# Patient Record
Sex: FEMALE | Race: WHITE | NOT HISPANIC OR LATINO
[De-identification: names, ages, dates, MRNs, and addresses within clinical notes are randomized per-mention and may not be internally consistent; named-entity substitution may affect disease eponyms.]

---

## 2019-02-19 ENCOUNTER — APPOINTMENT (OUTPATIENT)
Dept: ORTHOPEDIC SURGERY | Facility: CLINIC | Age: 54
End: 2019-02-19
Payer: COMMERCIAL

## 2019-02-19 VITALS
SYSTOLIC BLOOD PRESSURE: 107 MMHG | BODY MASS INDEX: 22.16 KG/M2 | HEART RATE: 103 BPM | HEIGHT: 65 IN | WEIGHT: 133 LBS | DIASTOLIC BLOOD PRESSURE: 73 MMHG

## 2019-02-19 DIAGNOSIS — G89.0 CENTRAL PAIN SYNDROME: ICD-10-CM

## 2019-02-19 PROCEDURE — 99204 OFFICE O/P NEW MOD 45 MIN: CPT

## 2019-02-19 NOTE — PHYSICAL EXAM
[FreeTextEntry1] : Physical exam reveals a patient who seems to be distress complaining about time off neurological pain dysesthesia along the left side of the we have free of pain and discomfort over the dorsal aspect of the left foot on exam there is a solid soft tissue mass over the anterolateral aspect of the ankle MRI scan demonstrated deeply seated soft tissue mass of undetermined etiology. At this stage patient was recommended to consider exploration resection soft tissue mass left ankle the nature of the surgical procedure was discussed with the patient

## 2019-02-19 NOTE — REASON FOR VISIT
[FreeTextEntry1] : Presented for evaluation of pain tenderness and dysesthesia along the dorsal aspect of the left foot with a soft tissue mass left ankle

## 2019-02-22 ENCOUNTER — APPOINTMENT (OUTPATIENT)
Dept: ORTHOPEDIC SURGERY | Facility: HOSPITAL | Age: 54
End: 2019-02-22

## 2019-03-19 ENCOUNTER — APPOINTMENT (OUTPATIENT)
Dept: ORTHOPEDIC SURGERY | Facility: CLINIC | Age: 54
End: 2019-03-19

## 2019-03-21 ENCOUNTER — OUTPATIENT (OUTPATIENT)
Dept: OUTPATIENT SERVICES | Facility: HOSPITAL | Age: 54
LOS: 1 days | End: 2019-03-21
Payer: COMMERCIAL

## 2019-03-21 VITALS
OXYGEN SATURATION: 98 % | RESPIRATION RATE: 16 BRPM | HEART RATE: 91 BPM | DIASTOLIC BLOOD PRESSURE: 72 MMHG | SYSTOLIC BLOOD PRESSURE: 100 MMHG | HEIGHT: 63 IN | WEIGHT: 134.04 LBS | TEMPERATURE: 98 F

## 2019-03-21 DIAGNOSIS — Z90.89 ACQUIRED ABSENCE OF OTHER ORGANS: Chronic | ICD-10-CM

## 2019-03-21 DIAGNOSIS — M79.9 SOFT TISSUE DISORDER, UNSPECIFIED: ICD-10-CM

## 2019-03-21 DIAGNOSIS — Z78.9 OTHER SPECIFIED HEALTH STATUS: ICD-10-CM

## 2019-03-21 DIAGNOSIS — Z98.890 OTHER SPECIFIED POSTPROCEDURAL STATES: Chronic | ICD-10-CM

## 2019-03-21 DIAGNOSIS — Z90.49 ACQUIRED ABSENCE OF OTHER SPECIFIED PARTS OF DIGESTIVE TRACT: Chronic | ICD-10-CM

## 2019-03-21 DIAGNOSIS — R22.42 LOCALIZED SWELLING, MASS AND LUMP, LEFT LOWER LIMB: ICD-10-CM

## 2019-03-21 LAB
ALBUMIN SERPL ELPH-MCNC: 4.2 G/DL — SIGNIFICANT CHANGE UP (ref 3.3–5)
ALP SERPL-CCNC: 93 U/L — SIGNIFICANT CHANGE UP (ref 40–120)
ALT FLD-CCNC: 31 U/L — SIGNIFICANT CHANGE UP (ref 4–33)
ANION GAP SERPL CALC-SCNC: 11 MMO/L — SIGNIFICANT CHANGE UP (ref 7–14)
AST SERPL-CCNC: 24 U/L — SIGNIFICANT CHANGE UP (ref 4–32)
BILIRUB SERPL-MCNC: 0.4 MG/DL — SIGNIFICANT CHANGE UP (ref 0.2–1.2)
BUN SERPL-MCNC: 18 MG/DL — SIGNIFICANT CHANGE UP (ref 7–23)
CALCIUM SERPL-MCNC: 9.8 MG/DL — SIGNIFICANT CHANGE UP (ref 8.4–10.5)
CHLORIDE SERPL-SCNC: 103 MMOL/L — SIGNIFICANT CHANGE UP (ref 98–107)
CO2 SERPL-SCNC: 25 MMOL/L — SIGNIFICANT CHANGE UP (ref 22–31)
CREAT SERPL-MCNC: 0.57 MG/DL — SIGNIFICANT CHANGE UP (ref 0.5–1.3)
GLUCOSE SERPL-MCNC: 92 MG/DL — SIGNIFICANT CHANGE UP (ref 70–99)
HCG SERPL-ACNC: < 5 MIU/ML — SIGNIFICANT CHANGE UP
HCT VFR BLD CALC: 41.2 % — SIGNIFICANT CHANGE UP (ref 34.5–45)
HGB BLD-MCNC: 13.2 G/DL — SIGNIFICANT CHANGE UP (ref 11.5–15.5)
MCHC RBC-ENTMCNC: 30.1 PG — SIGNIFICANT CHANGE UP (ref 27–34)
MCHC RBC-ENTMCNC: 32 % — SIGNIFICANT CHANGE UP (ref 32–36)
MCV RBC AUTO: 94.1 FL — SIGNIFICANT CHANGE UP (ref 80–100)
NRBC # FLD: 0 K/UL — LOW (ref 25–125)
PLATELET # BLD AUTO: 324 K/UL — SIGNIFICANT CHANGE UP (ref 150–400)
PMV BLD: 9.1 FL — SIGNIFICANT CHANGE UP (ref 7–13)
POTASSIUM SERPL-MCNC: 3.9 MMOL/L — SIGNIFICANT CHANGE UP (ref 3.5–5.3)
POTASSIUM SERPL-SCNC: 3.9 MMOL/L — SIGNIFICANT CHANGE UP (ref 3.5–5.3)
PROT SERPL-MCNC: 6.9 G/DL — SIGNIFICANT CHANGE UP (ref 6–8.3)
RBC # BLD: 4.38 M/UL — SIGNIFICANT CHANGE UP (ref 3.8–5.2)
RBC # FLD: 12.3 % — SIGNIFICANT CHANGE UP (ref 10.3–14.5)
SODIUM SERPL-SCNC: 139 MMOL/L — SIGNIFICANT CHANGE UP (ref 135–145)
WBC # BLD: 7.65 K/UL — SIGNIFICANT CHANGE UP (ref 3.8–10.5)
WBC # FLD AUTO: 7.65 K/UL — SIGNIFICANT CHANGE UP (ref 3.8–10.5)

## 2019-03-21 PROCEDURE — 93010 ELECTROCARDIOGRAM REPORT: CPT

## 2019-03-21 RX ORDER — SODIUM CHLORIDE 9 MG/ML
1000 INJECTION, SOLUTION INTRAVENOUS
Qty: 0 | Refills: 0 | Status: DISCONTINUED | OUTPATIENT
Start: 2019-03-28 | End: 2019-04-12

## 2019-03-21 NOTE — H&P PST ADULT - NSICDXPROBLEM_GEN_ALL_CORE_FT
PROBLEM DIAGNOSES  Problem: Mass of left ankle  Assessment and Plan: Pt. is scheduled for exploration resection soft tissue mass left ankle 3/28/19.     Problem: Patient is scheduled for surgical procedure  Assessment and Plan: OR booking notified of sleep apnea and PCN allergy.

## 2019-03-21 NOTE — H&P PST ADULT - NSICDXFAMILYHX_GEN_ALL_CORE_FT
FAMILY HISTORY:  Family history of TIAs, father   FH: diabetes mellitus, father   FH: HTN (hypertension), father , mother 83 yo

## 2019-03-21 NOTE — H&P PST ADULT - NSICDXPASTSURGICALHX_GEN_ALL_CORE_FT
PAST SURGICAL HISTORY:  H/O lymph node biopsy "mediastinal"-2005    History of appendectomy as a child    History of tonsillectomy as a child

## 2019-03-21 NOTE — H&P PST ADULT - NSICDXPASTMEDICALHX_GEN_ALL_CORE_FT
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

## 2019-03-22 PROBLEM — I63.9 CEREBRAL INFARCTION, UNSPECIFIED: Chronic | Status: ACTIVE | Noted: 2019-03-21

## 2019-03-22 PROBLEM — C81.90 HODGKIN LYMPHOMA, UNSPECIFIED, UNSPECIFIED SITE: Chronic | Status: ACTIVE | Noted: 2019-03-21

## 2019-03-22 PROBLEM — Z86.69 PERSONAL HISTORY OF OTHER DISEASES OF THE NERVOUS SYSTEM AND SENSE ORGANS: Chronic | Status: ACTIVE | Noted: 2019-03-21

## 2019-03-22 PROBLEM — Z87.898 PERSONAL HISTORY OF OTHER SPECIFIED CONDITIONS: Chronic | Status: ACTIVE | Noted: 2019-03-21

## 2019-03-27 ENCOUNTER — TRANSCRIPTION ENCOUNTER (OUTPATIENT)
Age: 54
End: 2019-03-27

## 2019-03-28 ENCOUNTER — RESULT REVIEW (OUTPATIENT)
Age: 54
End: 2019-03-28

## 2019-03-28 ENCOUNTER — OUTPATIENT (OUTPATIENT)
Dept: OUTPATIENT SERVICES | Facility: HOSPITAL | Age: 54
LOS: 1 days | Discharge: HOME | End: 2019-03-28
Payer: COMMERCIAL

## 2019-03-28 ENCOUNTER — APPOINTMENT (OUTPATIENT)
Age: 54
End: 2019-03-28

## 2019-03-28 VITALS
DIASTOLIC BLOOD PRESSURE: 67 MMHG | TEMPERATURE: 98 F | HEIGHT: 63 IN | OXYGEN SATURATION: 97 % | RESPIRATION RATE: 16 BRPM | SYSTOLIC BLOOD PRESSURE: 108 MMHG | WEIGHT: 134.04 LBS | HEART RATE: 89 BPM

## 2019-03-28 VITALS
RESPIRATION RATE: 25 BRPM | HEART RATE: 109 BPM | OXYGEN SATURATION: 93 % | DIASTOLIC BLOOD PRESSURE: 79 MMHG | SYSTOLIC BLOOD PRESSURE: 120 MMHG

## 2019-03-28 DIAGNOSIS — Z90.89 ACQUIRED ABSENCE OF OTHER ORGANS: Chronic | ICD-10-CM

## 2019-03-28 DIAGNOSIS — Z98.890 OTHER SPECIFIED POSTPROCEDURAL STATES: Chronic | ICD-10-CM

## 2019-03-28 DIAGNOSIS — Z90.49 ACQUIRED ABSENCE OF OTHER SPECIFIED PARTS OF DIGESTIVE TRACT: Chronic | ICD-10-CM

## 2019-03-28 DIAGNOSIS — M79.9 SOFT TISSUE DISORDER, UNSPECIFIED: ICD-10-CM

## 2019-03-28 LAB — HCG UR QL: NEGATIVE — SIGNIFICANT CHANGE UP

## 2019-03-28 PROCEDURE — 88305 TISSUE EXAM BY PATHOLOGIST: CPT | Mod: 26

## 2019-03-28 PROCEDURE — 88331 PATH CONSLTJ SURG 1 BLK 1SPC: CPT | Mod: 26

## 2019-03-28 PROCEDURE — 27634 EXC LEG/ANKLE TUM DEP 5 CM/>: CPT | Mod: LT

## 2019-03-28 PROCEDURE — 88312 SPECIAL STAINS GROUP 1: CPT | Mod: 26

## 2019-03-28 PROCEDURE — 88304 TISSUE EXAM BY PATHOLOGIST: CPT | Mod: 26

## 2019-03-28 RX ORDER — FENTANYL CITRATE 50 UG/ML
25 INJECTION INTRAVENOUS
Qty: 0 | Refills: 0 | Status: DISCONTINUED | OUTPATIENT
Start: 2019-03-28 | End: 2019-03-28

## 2019-03-28 RX ORDER — CEPHALEXIN 500 MG
1 CAPSULE ORAL
Qty: 20 | Refills: 0
Start: 2019-03-28 | End: 2019-04-01

## 2019-03-28 RX ORDER — SODIUM CHLORIDE 9 MG/ML
1000 INJECTION, SOLUTION INTRAVENOUS
Qty: 0 | Refills: 0 | Status: DISCONTINUED | OUTPATIENT
Start: 2019-03-28 | End: 2019-04-12

## 2019-03-28 RX ORDER — OXYCODONE HYDROCHLORIDE 5 MG/1
1 TABLET ORAL
Qty: 20 | Refills: 0
Start: 2019-03-28

## 2019-03-28 RX ORDER — ONDANSETRON 8 MG/1
4 TABLET, FILM COATED ORAL ONCE
Qty: 0 | Refills: 0 | Status: DISCONTINUED | OUTPATIENT
Start: 2019-03-28 | End: 2019-04-12

## 2019-03-28 RX ORDER — OXYCODONE HYDROCHLORIDE 5 MG/1
5 TABLET ORAL ONCE
Qty: 0 | Refills: 0 | Status: DISCONTINUED | OUTPATIENT
Start: 2019-03-28 | End: 2019-03-28

## 2019-03-28 NOTE — BRIEF OPERATIVE NOTE - NSICDXBRIEFPROCEDURE_GEN_ALL_CORE_FT
PROCEDURES:  Excisional biopsy, mass, bone, lower leg 28-Mar-2019 20:43:28 right ankle Dong Castillo

## 2019-03-28 NOTE — ASU DISCHARGE PLAN (ADULT/PEDIATRIC) - CARE PROVIDER_API CALL
Matt Cain)  Orthopedics  [address], [address]  [place] [address]  Phone: [phone]  Fax: [phone]  Follow Up Time: 1 week

## 2019-03-28 NOTE — ASU DISCHARGE PLAN (ADULT/PEDIATRIC) - CALL YOUR DOCTOR IF YOU HAVE ANY OF THE FOLLOWING:
Fever greater than (need to indicate Fahrenheit or Celsius)/Numbness, tingling, color or temperature change to extremity/Bleeding that does not stop

## 2019-03-29 LAB
GRAM STN WND: SIGNIFICANT CHANGE UP
SPECIMEN SOURCE: SIGNIFICANT CHANGE UP

## 2019-03-30 LAB — SPECIMEN SOURCE: SIGNIFICANT CHANGE UP

## 2019-04-01 LAB
-  CEFAZOLIN: SIGNIFICANT CHANGE UP
-  CIPROFLOXACIN: SIGNIFICANT CHANGE UP
-  CLINDAMYCIN: SIGNIFICANT CHANGE UP
-  ERYTHROMYCIN: SIGNIFICANT CHANGE UP
-  GENTAMICIN: SIGNIFICANT CHANGE UP
-  LEVOFLOXACIN: SIGNIFICANT CHANGE UP
-  MOXIFLOXACIN(AEROBIC): SIGNIFICANT CHANGE UP
-  OXACILLIN: SIGNIFICANT CHANGE UP
-  RIFAMPIN.: SIGNIFICANT CHANGE UP
-  TETRACYCLINE: SIGNIFICANT CHANGE UP
-  TRIMETHOPRIM/SULFAMETHOXAZOLE: SIGNIFICANT CHANGE UP
-  VANCOMYCIN: SIGNIFICANT CHANGE UP
CULTURE - SURGICAL SITE: SIGNIFICANT CHANGE UP
GRAM STN WND: SIGNIFICANT CHANGE UP
METHOD TYPE: SIGNIFICANT CHANGE UP
ORGANISM # SPEC MICROSCOPIC CNT: SIGNIFICANT CHANGE UP
ORGANISM # SPEC MICROSCOPIC CNT: SIGNIFICANT CHANGE UP

## 2019-04-09 ENCOUNTER — APPOINTMENT (OUTPATIENT)
Age: 54
End: 2019-04-09
Payer: COMMERCIAL

## 2019-04-09 PROBLEM — F32.9 MAJOR DEPRESSIVE DISORDER, SINGLE EPISODE, UNSPECIFIED: Chronic | Status: ACTIVE | Noted: 2019-03-21

## 2019-04-09 PROBLEM — K80.20 CALCULUS OF GALLBLADDER WITHOUT CHOLECYSTITIS WITHOUT OBSTRUCTION: Chronic | Status: ACTIVE | Noted: 2019-03-21

## 2019-04-09 PROBLEM — K76.0 FATTY (CHANGE OF) LIVER, NOT ELSEWHERE CLASSIFIED: Chronic | Status: ACTIVE | Noted: 2019-03-21

## 2019-04-09 PROBLEM — K29.70 GASTRITIS, UNSPECIFIED, WITHOUT BLEEDING: Chronic | Status: ACTIVE | Noted: 2019-03-21

## 2019-04-09 PROBLEM — Z87.898 PERSONAL HISTORY OF OTHER SPECIFIED CONDITIONS: Chronic | Status: ACTIVE | Noted: 2019-03-21

## 2019-04-09 PROBLEM — M19.90 UNSPECIFIED OSTEOARTHRITIS, UNSPECIFIED SITE: Chronic | Status: ACTIVE | Noted: 2019-03-21

## 2019-04-09 PROCEDURE — 99024 POSTOP FOLLOW-UP VISIT: CPT

## 2019-04-09 NOTE — HISTORY OF PRESENT ILLNESS
[de-identified] : Patient was seen today in followup less than 2 weeks post exploration the compression of the large inflammatory mass involving the left ankle the surgical procedure was without any complication the pathology and suggested inflammatory process culture grew stapgraciela aureous . On examination today he surgical her wound healed nice stitches were removed most of the swelling subsided patient was informed that culture grew Staphylococcus Aurerous. Sensitive to Keflex. At this stage patient was recommended to continue antibiotic Keflex and to be seen by infectious disease specialist\par

## 2019-04-16 ENCOUNTER — APPOINTMENT (OUTPATIENT)
Age: 54
End: 2019-04-16
Payer: COMMERCIAL

## 2019-04-16 PROCEDURE — 99024 POSTOP FOLLOW-UP VISIT: CPT

## 2019-04-16 NOTE — HISTORY OF PRESENT ILLNESS
[de-identified] : On exam today swelling subsided surgical wound healed denies no discharge no skin erythema and patient regaining motion over the left ankle at this stage patient was recommended to continue to be seen by the infectious disease specialist to continue antibiotic as indicated and to be seen again in 3 months for followup the risk of length of reinfection of that area could not be ruled out [de-identified] : Patient was seen today in followup in 3 weeks following exploration debridement irrigation of an abscess from the lateral aspect of the left ankle and diagnoses as infected. Of this and the surgical procedure was without any complication he shouldn't at this stage getting a antibiotic.

## 2019-04-19 LAB — SURGICAL PATHOLOGY STUDY: SIGNIFICANT CHANGE UP

## 2019-04-24 LAB — FUNGUS SPEC QL CULT: SIGNIFICANT CHANGE UP

## 2019-05-12 ENCOUNTER — EMERGENCY (EMERGENCY)
Facility: HOSPITAL | Age: 54
LOS: 1 days | Discharge: HOME | End: 2019-05-12
Attending: EMERGENCY MEDICINE | Admitting: EMERGENCY MEDICINE
Payer: COMMERCIAL

## 2019-05-12 VITALS
OXYGEN SATURATION: 98 % | SYSTOLIC BLOOD PRESSURE: 131 MMHG | HEART RATE: 95 BPM | TEMPERATURE: 98 F | DIASTOLIC BLOOD PRESSURE: 93 MMHG | RESPIRATION RATE: 18 BRPM

## 2019-05-12 DIAGNOSIS — Z98.890 OTHER SPECIFIED POSTPROCEDURAL STATES: Chronic | ICD-10-CM

## 2019-05-12 DIAGNOSIS — Z90.89 ACQUIRED ABSENCE OF OTHER ORGANS: Chronic | ICD-10-CM

## 2019-05-12 DIAGNOSIS — Z90.49 ACQUIRED ABSENCE OF OTHER SPECIFIED PARTS OF DIGESTIVE TRACT: Chronic | ICD-10-CM

## 2019-05-12 PROCEDURE — 99285 EMERGENCY DEPT VISIT HI MDM: CPT | Mod: 25

## 2019-05-12 PROCEDURE — 73610 X-RAY EXAM OF ANKLE: CPT | Mod: 26,LT

## 2019-05-12 NOTE — ED PROVIDER NOTE - NSFOLLOWUPINSTRUCTIONS_ED_ALL_ED_FT
Please follow up with Dr. Cain this week in his office    Take the antibiotics prescribed to you for your ankle infection    Take tylenol and motrin for pain

## 2019-05-12 NOTE — ED PROVIDER NOTE - ATTENDING CONTRIBUTION TO CARE
53 year old with right ankle swelling and redness. previous removal of growth as site. no sysmtemic symptoms. mart check esr crp and ortho consult for drainage. septic arthritis possibel but unlikely. mart check basic labs, pain control, reassess

## 2019-05-12 NOTE — ED PROVIDER NOTE - PROGRESS NOTE DETAILS
Ortho consulted, will see patient shortly. discussed with ortho, unlikely septic joint, however waiting for attending given recent surgery. patient aware and agrees with plan to wait for attending. nancy power - paged ortho x2. no response. awaiting call back. patient well appearing, sleeping. christie, pgy-2: pt signed out to me by overnight resident, pt vitals remain stable christie: pt to be dced on PO keflex and f/u w/ Dr. Cain of orthopedics this week

## 2019-05-12 NOTE — ED ADULT NURSE NOTE - PMH
Acquired hypothyroidism  "from radiation", pt. 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

## 2019-05-12 NOTE — ED ADULT NURSE NOTE - PSH
H/O lymph node biopsy  "mediastinal"-2005  History of appendectomy  as a child  History of tonsillectomy  as a child

## 2019-05-12 NOTE — ED PROVIDER NOTE - OBJECTIVE STATEMENT
53yoF hx of CVA, arthritis, lymphoma in 2005, now sp left ankle mass removal (March 22) complicated by staff infection, on 4 weeks of doxycycline, now presenting with left ankle pain and swelling, worsening for 3 days, moderate, throbbing, worse with walking, but now only using crutches. Not improved with home pain meds. Denies fevers, chills, nausea, vomiting, diarrhea, chest pain, sob, abd pain, weakness, numbness, tingling or other issues.

## 2019-05-12 NOTE — ED ADULT TRIAGE NOTE - CHIEF COMPLAINT QUOTE
Pt who is s/p Excisional biopsy of mass & bone of her left lower leg 28-Mar-2019 which was complicated by a staph infection for which she completed a 2 week course of abx presents with c/o swelling and pain to surgical site x 3 days.

## 2019-05-12 NOTE — ED PROVIDER NOTE - CLINICAL SUMMARY MEDICAL DECISION MAKING FREE TEXT BOX
female with prior ankle surgery now with swelling, likely effusion, will send labs, crp, esr, xray, and call ortho for consult given risk of septic joint, although more likely inflammatory effusion. likely will require arthrocentesis with ortho guidance.

## 2019-05-13 VITALS
HEART RATE: 98 BPM | RESPIRATION RATE: 16 BRPM | OXYGEN SATURATION: 96 % | SYSTOLIC BLOOD PRESSURE: 111 MMHG | DIASTOLIC BLOOD PRESSURE: 65 MMHG | TEMPERATURE: 98 F

## 2019-05-13 LAB
ALBUMIN SERPL ELPH-MCNC: 4.2 G/DL — SIGNIFICANT CHANGE UP (ref 3.3–5)
ALP SERPL-CCNC: 110 U/L — SIGNIFICANT CHANGE UP (ref 40–120)
ALT FLD-CCNC: 22 U/L — SIGNIFICANT CHANGE UP (ref 4–33)
ANION GAP SERPL CALC-SCNC: 12 MMO/L — SIGNIFICANT CHANGE UP (ref 7–14)
APTT BLD: 31.3 SEC — SIGNIFICANT CHANGE UP (ref 27.5–36.3)
AST SERPL-CCNC: 21 U/L — SIGNIFICANT CHANGE UP (ref 4–32)
BASE EXCESS BLDV CALC-SCNC: 4.9 MMOL/L — SIGNIFICANT CHANGE UP
BASOPHILS # BLD AUTO: 0.05 K/UL — SIGNIFICANT CHANGE UP (ref 0–0.2)
BASOPHILS NFR BLD AUTO: 0.7 % — SIGNIFICANT CHANGE UP (ref 0–2)
BILIRUB SERPL-MCNC: 0.2 MG/DL — SIGNIFICANT CHANGE UP (ref 0.2–1.2)
BLOOD GAS VENOUS - CREATININE: 0.4 MG/DL — LOW (ref 0.5–1.3)
BUN SERPL-MCNC: 11 MG/DL — SIGNIFICANT CHANGE UP (ref 7–23)
CALCIUM SERPL-MCNC: 9.3 MG/DL — SIGNIFICANT CHANGE UP (ref 8.4–10.5)
CHLORIDE BLDV-SCNC: 106 MMOL/L — SIGNIFICANT CHANGE UP (ref 96–108)
CHLORIDE SERPL-SCNC: 103 MMOL/L — SIGNIFICANT CHANGE UP (ref 98–107)
CO2 SERPL-SCNC: 26 MMOL/L — SIGNIFICANT CHANGE UP (ref 22–31)
CREAT SERPL-MCNC: 0.57 MG/DL — SIGNIFICANT CHANGE UP (ref 0.5–1.3)
CRP SERPL-MCNC: 14.4 MG/L — HIGH
EOSINOPHIL # BLD AUTO: 0.39 K/UL — SIGNIFICANT CHANGE UP (ref 0–0.5)
EOSINOPHIL NFR BLD AUTO: 5.2 % — SIGNIFICANT CHANGE UP (ref 0–6)
ERYTHROCYTE [SEDIMENTATION RATE] IN BLOOD: 7 MM/HR — SIGNIFICANT CHANGE UP (ref 4–25)
GAS PNL BLDV: 136 MMOL/L — SIGNIFICANT CHANGE UP (ref 136–146)
GLUCOSE BLDV-MCNC: 101 MG/DL — HIGH (ref 70–99)
GLUCOSE SERPL-MCNC: 101 MG/DL — HIGH (ref 70–99)
HCO3 BLDV-SCNC: 28 MMOL/L — HIGH (ref 20–27)
HCT VFR BLD CALC: 40.5 % — SIGNIFICANT CHANGE UP (ref 34.5–45)
HCT VFR BLDV CALC: 41 % — SIGNIFICANT CHANGE UP (ref 34.5–45)
HGB BLD-MCNC: 13.2 G/DL — SIGNIFICANT CHANGE UP (ref 11.5–15.5)
HGB BLDV-MCNC: 13.3 G/DL — SIGNIFICANT CHANGE UP (ref 11.5–15.5)
IMM GRANULOCYTES NFR BLD AUTO: 0.4 % — SIGNIFICANT CHANGE UP (ref 0–1.5)
INR BLD: 0.94 — SIGNIFICANT CHANGE UP (ref 0.88–1.17)
LACTATE BLDV-MCNC: 1.2 MMOL/L — SIGNIFICANT CHANGE UP (ref 0.5–2)
LYMPHOCYTES # BLD AUTO: 1.96 K/UL — SIGNIFICANT CHANGE UP (ref 1–3.3)
LYMPHOCYTES # BLD AUTO: 25.9 % — SIGNIFICANT CHANGE UP (ref 13–44)
MCHC RBC-ENTMCNC: 30.7 PG — SIGNIFICANT CHANGE UP (ref 27–34)
MCHC RBC-ENTMCNC: 32.6 % — SIGNIFICANT CHANGE UP (ref 32–36)
MCV RBC AUTO: 94.2 FL — SIGNIFICANT CHANGE UP (ref 80–100)
MONOCYTES # BLD AUTO: 0.75 K/UL — SIGNIFICANT CHANGE UP (ref 0–0.9)
MONOCYTES NFR BLD AUTO: 9.9 % — SIGNIFICANT CHANGE UP (ref 2–14)
NEUTROPHILS # BLD AUTO: 4.38 K/UL — SIGNIFICANT CHANGE UP (ref 1.8–7.4)
NEUTROPHILS NFR BLD AUTO: 57.9 % — SIGNIFICANT CHANGE UP (ref 43–77)
NRBC # FLD: 0 K/UL — SIGNIFICANT CHANGE UP (ref 0–0)
PCO2 BLDV: 51 MMHG — SIGNIFICANT CHANGE UP (ref 41–51)
PH BLDV: 7.38 PH — SIGNIFICANT CHANGE UP (ref 7.32–7.43)
PLATELET # BLD AUTO: 288 K/UL — SIGNIFICANT CHANGE UP (ref 150–400)
PMV BLD: 9.2 FL — SIGNIFICANT CHANGE UP (ref 7–13)
PO2 BLDV: 45 MMHG — HIGH (ref 35–40)
POTASSIUM BLDV-SCNC: 3.7 MMOL/L — SIGNIFICANT CHANGE UP (ref 3.4–4.5)
POTASSIUM SERPL-MCNC: 4 MMOL/L — SIGNIFICANT CHANGE UP (ref 3.5–5.3)
POTASSIUM SERPL-SCNC: 4 MMOL/L — SIGNIFICANT CHANGE UP (ref 3.5–5.3)
PROT SERPL-MCNC: 7 G/DL — SIGNIFICANT CHANGE UP (ref 6–8.3)
PROTHROM AB SERPL-ACNC: 10.4 SEC — SIGNIFICANT CHANGE UP (ref 9.8–13.1)
RBC # BLD: 4.3 M/UL — SIGNIFICANT CHANGE UP (ref 3.8–5.2)
RBC # FLD: 12.1 % — SIGNIFICANT CHANGE UP (ref 10.3–14.5)
SAO2 % BLDV: 79.3 % — SIGNIFICANT CHANGE UP (ref 60–85)
SODIUM SERPL-SCNC: 141 MMOL/L — SIGNIFICANT CHANGE UP (ref 135–145)
WBC # BLD: 7.56 K/UL — SIGNIFICANT CHANGE UP (ref 3.8–10.5)
WBC # FLD AUTO: 7.56 K/UL — SIGNIFICANT CHANGE UP (ref 3.8–10.5)

## 2019-05-13 RX ORDER — CEPHALEXIN 500 MG
1 CAPSULE ORAL
Qty: 20 | Refills: 0
Start: 2019-05-13 | End: 2019-05-22

## 2019-05-13 RX ORDER — AMITRIPTYLINE HCL 25 MG
25 TABLET ORAL ONCE
Refills: 0 | Status: COMPLETED | OUTPATIENT
Start: 2019-05-13 | End: 2019-05-13

## 2019-05-13 RX ORDER — QUETIAPINE FUMARATE 200 MG/1
200 TABLET, FILM COATED ORAL ONCE
Refills: 0 | Status: COMPLETED | OUTPATIENT
Start: 2019-05-13 | End: 2019-05-13

## 2019-05-13 RX ADMIN — QUETIAPINE FUMARATE 200 MILLIGRAM(S): 200 TABLET, FILM COATED ORAL at 03:01

## 2019-05-13 RX ADMIN — Medication 25 MILLIGRAM(S): at 03:02

## 2019-05-13 NOTE — ED ADULT NURSE REASSESSMENT NOTE - NS ED NURSE REASSESS COMMENT FT1
report received from SUSHMA Bonilla, pt remains AAOx3, VS as per flow sheet, pt in NAD, awaiting ortho c/s and dispo, will continue to monitor pt.

## 2019-05-13 NOTE — CONSULT NOTE ADULT - SUBJECTIVE AND OBJECTIVE BOX
Orthopaedic Surgery Consult Note    Patient is a 53y old  Female who presents with a chief complaint of   HPI: 54yo F with PMH of CVA, arthritis, lymphoma, who had left ankle mass removal on . Cultures from cyst determined it to be an abscess that was infected with MSSA. She was treated with Keflex initially and then went and saw ID physician Dr. Richter who prescribed 4 weeks of doxycycline. She did well for a couple of weeks after cessation of antibiotics until a few days prior she developed left ankle pain and swelling. The swelling is localized to left lateral ankle where she had surgery. The ankle pain is throbbing and worse with walking. She is able to bear weight but has been using crutches. Denies fevers, chills, nausea, vomiting, diarrhea, chest pain, sob, abd pain, weakness, numbness, tingling or other issues.      PAST MEDICAL & SURGICAL HISTORY:  Acquired hypothyroidism: &quot;from radiation&quot;, pt. 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, the pressure is not high&quot;  Depression  History of seizure: x1-2007  H/O lymph node biopsy: &quot;mediastinal&quot;-  History of appendectomy: as a child  History of tonsillectomy: as a child    [] No significant past history as reviewed with the patient and family    FAMILY HISTORY:  Family history of TIAs: father   FH: diabetes mellitus: father   FH: HTN (hypertension): father , mother 83 yo    [] Family history not pertinent as reviewed with the patient and family    SOCIAL HISTORY:    MEDICATIONS  (STANDING):    MEDICATIONS  (PRN):    Allergies    penicillin (Rash)    Intolerances        REVIEW OF SYSTEMS  [x] All review of systems negative except for those marked.  Systemic:	[] Fever		[] Chills		[] Night sweats		[] Fatigue	[] Other  [] Cardiovascular:  [] Pulmonary:  [] Renal/Urologic:  [] Gastrointestinal:  [] Metabolic:  [] Neurologic:  [] Hematologic:  [] ENT:  [] Ophthalmologic:  [] Musculoskeletal: see HPI    Vital Signs Last 24 Hrs  T(C): 36.1 (13 May 2019 01:14), Max: 36.7 (12 May 2019 22:53)  T(F): 97 (13 May 2019 01:14), Max: 98 (12 May 2019 22:53)  HR: 90 (13 May 2019 01:14) (90 - 95)  BP: 111/74 (13 May 2019 01:14) (111/74 - 131/93)  BP(mean): --  RR: 16 (13 May 2019 01:14) (16 - 18)  SpO2: 97% (13 May 2019 01:14) (97% - 98%)      PHYSICAL EXAM:  Exam:  Gen: NAD  LLE:  Swelling, erythema, over lateral ankle  Well healed surgical scar   Warmth and soft tissue swelling palpated  No fluctuance or crepitus felt over lateral ankle   Motor: 5/5 EHL/FHL/TA/Gastrocnemius  Sensory: SILT DP/SP/S/S/T nerve distributions  Vascular: 2+ Dorsalis Pedis pulse                            13.2   7.56  )-----------( 288      ( 13 May 2019 00:00 )             40.5     05-13    141  |  103  |  11  ----------------------------<  101<H>  4.0   |  26  |  0.57    Ca    9.3      13 May 2019 00:00    TPro  7.0  /  Alb  4.2  /  TBili  0.2  /  DBili  x   /  AST  21  /  ALT  22  /  AlkPhos  110  05-13    PT/INR - ( 13 May 2019 00:00 )   PT: 10.4 SEC;   INR: 0.94          PTT - ( 13 May 2019 00:00 )  PTT:31.3 SEC      IMAGING STUDIES:  L ankle XR  INTERPRETATION:  Soft tissue swelling about the lateral malleolus. No   acute fracture or dislocation.

## 2019-05-13 NOTE — CONSULT NOTE ADULT - ASSESSMENT
53y Female with PMH as above and recent L ankle infected cyst removal on March 22. Patient likely has local recurrence of infection, low concern for septic arthritis at current time.     -pain control  -PT  -WBAT  -Will d/w attending Dr. Matt Cain

## 2019-05-16 ENCOUNTER — INPATIENT (INPATIENT)
Facility: HOSPITAL | Age: 54
LOS: 13 days | Discharge: HOME HEALTH | End: 2019-05-30
Attending: INTERNAL MEDICINE | Admitting: INTERNAL MEDICINE
Payer: COMMERCIAL

## 2019-05-16 VITALS
OXYGEN SATURATION: 100 % | SYSTOLIC BLOOD PRESSURE: 118 MMHG | RESPIRATION RATE: 16 BRPM | DIASTOLIC BLOOD PRESSURE: 79 MMHG | HEART RATE: 97 BPM | TEMPERATURE: 98 F

## 2019-05-16 DIAGNOSIS — Z98.890 OTHER SPECIFIED POSTPROCEDURAL STATES: Chronic | ICD-10-CM

## 2019-05-16 DIAGNOSIS — Z90.49 ACQUIRED ABSENCE OF OTHER SPECIFIED PARTS OF DIGESTIVE TRACT: Chronic | ICD-10-CM

## 2019-05-16 DIAGNOSIS — Z90.89 ACQUIRED ABSENCE OF OTHER ORGANS: Chronic | ICD-10-CM

## 2019-05-16 LAB
ANION GAP SERPL CALC-SCNC: 11 MMO/L — SIGNIFICANT CHANGE UP (ref 7–14)
BASE EXCESS BLDV CALC-SCNC: 6.4 MMOL/L — SIGNIFICANT CHANGE UP
BASOPHILS # BLD AUTO: 0.04 K/UL — SIGNIFICANT CHANGE UP (ref 0–0.2)
BASOPHILS NFR BLD AUTO: 0.5 % — SIGNIFICANT CHANGE UP (ref 0–2)
BLOOD GAS VENOUS - CREATININE: 0.47 MG/DL — LOW (ref 0.5–1.3)
BLOOD GAS VENOUS - FIO2: 21 — SIGNIFICANT CHANGE UP
BUN SERPL-MCNC: 19 MG/DL — SIGNIFICANT CHANGE UP (ref 7–23)
CALCIUM SERPL-MCNC: 9.8 MG/DL — SIGNIFICANT CHANGE UP (ref 8.4–10.5)
CHLORIDE BLDV-SCNC: 105 MMOL/L — SIGNIFICANT CHANGE UP (ref 96–108)
CHLORIDE SERPL-SCNC: 101 MMOL/L — SIGNIFICANT CHANGE UP (ref 98–107)
CO2 SERPL-SCNC: 29 MMOL/L — SIGNIFICANT CHANGE UP (ref 22–31)
CREAT SERPL-MCNC: 0.68 MG/DL — SIGNIFICANT CHANGE UP (ref 0.5–1.3)
EOSINOPHIL # BLD AUTO: 0.36 K/UL — SIGNIFICANT CHANGE UP (ref 0–0.5)
EOSINOPHIL NFR BLD AUTO: 4.6 % — SIGNIFICANT CHANGE UP (ref 0–6)
GAS PNL BLDV: 135 MMOL/L — LOW (ref 136–146)
GLUCOSE BLDV-MCNC: 87 MG/DL — SIGNIFICANT CHANGE UP (ref 70–99)
GLUCOSE SERPL-MCNC: 90 MG/DL — SIGNIFICANT CHANGE UP (ref 70–99)
HCO3 BLDV-SCNC: 28 MMOL/L — HIGH (ref 20–27)
HCT VFR BLD CALC: 40.4 % — SIGNIFICANT CHANGE UP (ref 34.5–45)
HCT VFR BLDV CALC: 40.5 % — SIGNIFICANT CHANGE UP (ref 34.5–45)
HGB BLD-MCNC: 13.1 G/DL — SIGNIFICANT CHANGE UP (ref 11.5–15.5)
HGB BLDV-MCNC: 13.2 G/DL — SIGNIFICANT CHANGE UP (ref 11.5–15.5)
IMM GRANULOCYTES NFR BLD AUTO: 0.3 % — SIGNIFICANT CHANGE UP (ref 0–1.5)
LACTATE BLDV-MCNC: 0.9 MMOL/L — SIGNIFICANT CHANGE UP (ref 0.5–2)
LYMPHOCYTES # BLD AUTO: 2.03 K/UL — SIGNIFICANT CHANGE UP (ref 1–3.3)
LYMPHOCYTES # BLD AUTO: 26.1 % — SIGNIFICANT CHANGE UP (ref 13–44)
MCHC RBC-ENTMCNC: 30.2 PG — SIGNIFICANT CHANGE UP (ref 27–34)
MCHC RBC-ENTMCNC: 32.4 % — SIGNIFICANT CHANGE UP (ref 32–36)
MCV RBC AUTO: 93.1 FL — SIGNIFICANT CHANGE UP (ref 80–100)
MONOCYTES # BLD AUTO: 0.68 K/UL — SIGNIFICANT CHANGE UP (ref 0–0.9)
MONOCYTES NFR BLD AUTO: 8.7 % — SIGNIFICANT CHANGE UP (ref 2–14)
NEUTROPHILS # BLD AUTO: 4.66 K/UL — SIGNIFICANT CHANGE UP (ref 1.8–7.4)
NEUTROPHILS NFR BLD AUTO: 59.8 % — SIGNIFICANT CHANGE UP (ref 43–77)
NRBC # FLD: 0 K/UL — SIGNIFICANT CHANGE UP (ref 0–0)
PCO2 BLDV: 53 MMHG — HIGH (ref 41–51)
PH BLDV: 7.39 PH — SIGNIFICANT CHANGE UP (ref 7.32–7.43)
PLATELET # BLD AUTO: 322 K/UL — SIGNIFICANT CHANGE UP (ref 150–400)
PMV BLD: 9.1 FL — SIGNIFICANT CHANGE UP (ref 7–13)
PO2 BLDV: 28 MMHG — LOW (ref 35–40)
POTASSIUM BLDV-SCNC: 3.8 MMOL/L — SIGNIFICANT CHANGE UP (ref 3.4–4.5)
POTASSIUM SERPL-MCNC: 4.2 MMOL/L — SIGNIFICANT CHANGE UP (ref 3.5–5.3)
POTASSIUM SERPL-SCNC: 4.2 MMOL/L — SIGNIFICANT CHANGE UP (ref 3.5–5.3)
RBC # BLD: 4.34 M/UL — SIGNIFICANT CHANGE UP (ref 3.8–5.2)
RBC # FLD: 11.9 % — SIGNIFICANT CHANGE UP (ref 10.3–14.5)
SAO2 % BLDV: 48.5 % — LOW (ref 60–85)
SODIUM SERPL-SCNC: 141 MMOL/L — SIGNIFICANT CHANGE UP (ref 135–145)
WBC # BLD: 7.79 K/UL — SIGNIFICANT CHANGE UP (ref 3.8–10.5)
WBC # FLD AUTO: 7.79 K/UL — SIGNIFICANT CHANGE UP (ref 3.8–10.5)

## 2019-05-16 PROCEDURE — 93971 EXTREMITY STUDY: CPT | Mod: 26,LT

## 2019-05-16 PROCEDURE — 73600 X-RAY EXAM OF ANKLE: CPT | Mod: 26,LT

## 2019-05-16 RX ORDER — AMITRIPTYLINE HCL 25 MG
25 TABLET ORAL AT BEDTIME
Refills: 0 | Status: DISCONTINUED | OUTPATIENT
Start: 2019-05-16 | End: 2019-05-30

## 2019-05-16 RX ORDER — QUETIAPINE FUMARATE 200 MG/1
100 TABLET, FILM COATED ORAL AT BEDTIME
Refills: 0 | Status: DISCONTINUED | OUTPATIENT
Start: 2019-05-16 | End: 2019-05-17

## 2019-05-16 RX ORDER — KETOROLAC TROMETHAMINE 30 MG/ML
15 SYRINGE (ML) INJECTION ONCE
Refills: 0 | Status: DISCONTINUED | OUTPATIENT
Start: 2019-05-16 | End: 2019-05-16

## 2019-05-16 RX ADMIN — Medication 15 MILLIGRAM(S): at 23:00

## 2019-05-16 RX ADMIN — Medication 100 MILLIGRAM(S): at 21:13

## 2019-05-16 RX ADMIN — Medication 15 MILLIGRAM(S): at 21:14

## 2019-05-16 RX ADMIN — Medication 25 MILLIGRAM(S): at 23:33

## 2019-05-16 RX ADMIN — QUETIAPINE FUMARATE 100 MILLIGRAM(S): 200 TABLET, FILM COATED ORAL at 23:33

## 2019-05-16 NOTE — ED PROVIDER NOTE - OBJECTIVE STATEMENT
52 yo F presents with worsening LLE swelling and redness over the past 1 week. patient is s/p excision of L ankle infected mass by ortho Dr. Cain on 3/28. completed a 4 week course of doxycyline last wednesday based on cultures, with improvement of swelling/erythema. that recurred on friday and has been worsening since then. was seen in the ER on sunday, and started on keflex abx. reports severe pain, has not taken any pain meds.   no f/ch, cp, sob, abd pain, N/V/D

## 2019-05-16 NOTE — ED CDU PROVIDER INITIAL DAY NOTE - PHYSICAL EXAMINATION
Left ankle: demarcated area of significant erythema and swelling at the lateral malleolus, swelling extends to the dorsum of the foot, +warmth to touch, DP/PT pulses 2+, sensation intact to light touch, motor intact, ambulatory with steady gait.   No calf tenderness.

## 2019-05-16 NOTE — ED PROVIDER NOTE - CLINICAL SUMMARY MEDICAL DECISION MAKING FREE TEXT BOX
worsening cellultisi of LLE on keflex abx. but cultures not sensitive for keflex. no fluctuance to suggest abcess. full rom of ankle joint, no e/o septic arthritis. patient started on clindamycin abx in the er based on wound cultures. worsening cellultisi of LLE on keflex abx. no fluctuance to suggest abcess. full rom of ankle joint, no e/o septic arthritis. patient started on clindamycin abx in the er based on wound cultures. Orthopedics and infectious disease were consulted. ID amenable  With clindamycin.  Ortho had no acute recommendations at this time.  Patient was transferred to the CDU for continued IV antibiotics, monitoring, ID consult and pain control

## 2019-05-16 NOTE — ED CDU PROVIDER INITIAL DAY NOTE - CHIEF COMPLAINT
The patient is a 53y Female complaining of The patient is a 53y Female complaining of worsening pain and swelling to left ankle s/p surgery on 3/28.

## 2019-05-16 NOTE — ED PROVIDER NOTE - SKIN, MLM
swelling of LLE below the knee, especially arouind the ankle and foot with erythema and warmth. incision is c/D/I with no purulent drainge. no crepitus. full rom of ankle without pain or restriction in motion. 2+ PP./

## 2019-05-16 NOTE — CONSULT NOTE ADULT - SUBJECTIVE AND OBJECTIVE BOX
Ortho Consult Note    54yo F with PMH of CVA, arthritis, lymphoma, who had left ankle mass removal on March 22. Cultures from cyst determined it to be an abscess that was infected with MSSA. She was treated with Keflex initially and then went and saw ID physician Dr. Richter who prescribed 4 weeks of doxycycline. She did well for a couple of weeks after cessation of antibiotics until a week ago she developed left ankle pain and swelling. She presented to [facility] on 5/12/19  and was discharged with PO keflex after being seen by Orthopedics. She was given instructions to follow up with Dr. Cain.     She presents today with the same left lateral ankle swelling and redness that has not improved with antibiotics. The swelling is localized to left lateral ankle where she had surgery. The ankle pain is throbbing and worse with walking. She is able to bear weight but has been using crutches. Denies fevers, chills, nausea, vomiting, diarrhea, chest pain, sob, abd pain, weakness, numbness, tingling or other issues.      MEDICATIONS  (STANDING):    Allergies    penicillin (Rash)    Intolerances                              13.1   7.79  )-----------( 322      ( 16 May 2019 21:10 )             40.4             Vital Signs Last 24 Hrs  T(C): 36.7 (05-16-19 @ 19:23), Max: 36.7 (05-16-19 @ 19:23)  T(F): 98.1 (05-16-19 @ 19:23), Max: 98.1 (05-16-19 @ 19:23)  HR: 97 (05-16-19 @ 19:23) (97 - 97)  BP: 118/79 (05-16-19 @ 19:23) (118/79 - 118/79)  BP(mean): --  RR: 16 (05-16-19 @ 19:23) (16 - 16)  SpO2: 100% (05-16-19 @ 19:23) (100% - 100%)    Imaging:  -XR L ankle: No fracture or dislocation, lateral soft tissue swelling.     PHYSICAL EXAM:  Exam:  Gen: NAD  LLE:  Swelling, erythema, over lateral ankle  Well healed surgical scar   Warmth and soft tissue swelling palpated  No fluctuance or crepitus felt over lateral ankle   Motor: 5/5 EHL/FHL/TA/Gastrocnemius  Sensory: SILT DP/SP/S/S/T nerve distributions  Vascular: 2+ Dorsalis Pedis pulse                            13.1   7.79  )-----------( 322      ( 16 May 2019 21:10 )             40.4     05-16    141  |  101  |  19  ----------------------------<  90  4.2   |  29  |  0.68    Ca    9.8      16 May 2019 20:51

## 2019-05-16 NOTE — ED CDU PROVIDER INITIAL DAY NOTE - OBJECTIVE STATEMENT
53 year old female with PMH of non-Hodgkin lymphoma (s/p radiation and chemo, 2005), CVA (2005), and depression presented to the ED with complaint of worsening swelling and pain to left ankle. Pt had a bone marrow injection to left ankle in [place] complicated by a mass which was removed surgically by ortho on 3/24. On 4/10, she was started on Doxycycline for post-op infection, poor response to doxy after 4 weeks of tx, came to ED on 5/12 and sent home on Keflex which pt has not helped much. Pt denies fever and chills. Pt denies any other complaints. 53 year old female with PMH of non-Hodgkin lymphoma (s/p radiation and chemo, 2005), CVA (2005), and depression presented to the ED with complaint of worsening swelling and pain to left ankle. Pt had a bone marrow injection to left ankle in [place] complicated by a mass which was removed surgically by ortho on 3/24. On 4/10, she was started on Doxycycline for post-op infection, poor response to doxy after 4 weeks of tx, came to ED on 5/12 and sent home on Keflex which pt has not helped much. Pt denies fever and chills, nausea, vomiting, chest pain or dyspnea, weakness, numbness or tingling sensation of left foot. Pt denies any other complaints.

## 2019-05-16 NOTE — CONSULT NOTE ADULT - ASSESSMENT
53y Female with PMH as above and recent L ankle infected cyst removal on March 22. Patient likely has local recurrence of infection, low concern for septic arthritis at current time. According to ED pt will be admitted to medicine for IV abx treatment.     -Abx as per primary team  -pain control  -PT  -WBAT  -Ortho to follow  -no acute surgical intervention at this time   -Will d/w attending Dr. Matt Cain

## 2019-05-16 NOTE — ED CDU PROVIDER INITIAL DAY NOTE - DETAILS
53 year old female with PMH of non-Hodgkin lymphoma (s/p radiation and chemo, 2005), CVA (2005), and depression presented to the ED with complaint of worsening swelling and pain to left ankle s/p surgical mass removal of left ankle, developed post-op infection treated with 4wks of Doxy, poor response, then started on Keflex now on Day #4 outpatient mngnt without improvement.   In ED, labs show no leukocytosis, lactate normal, ESR and CRP pending. Rt Duplex US - negative for DVT. Given one dose of clindamycin. Seen by otho - advised no acute surgical intervention. ID consulted - will see pt in am.  Pt sent to CDU for therapeutic intensity. Pt is hemodynamically stable, afebrile. non-septic appearing. P: Clindamycin 600mg IV q8h, nsaids, foot elevation, serial foot neuro exam, repeat CBC/lactate, start home meds, vitals q4h, diet, ortho to follow, ID to consult in am. 53 year old female with PMH of non-Hodgkin lymphoma (s/p radiation and chemo, 2005), CVA (2005), and depression presented to the ED with complaint of worsening swelling and pain to left ankle s/p surgical mass removal of left ankle, developed post-op infection treated with 4wks of Doxy, poor response, then started on Keflex now on Day #4 outpatient mngnt without improvement. Wound culture +staph aureus sensitive to Cefazolin.  In ED, labs show no leukocytosis, lactate normal, ESR and CRP pending. Rt Duplex US - negative for DVT. Given one dose of clindamycin. Seen by otho - advised no acute surgical intervention. ID consulted - will see pt in am.  Pt sent to CDU for therapeutic intensity. Pt is hemodynamically stable, afebrile. non-septic appearing. P: Clindamycin 600mg IV q8h, nsaids, foot elevation, serial foot neuro exam, repeat CBC/lactate, start home meds, vitals q4h, diet, ortho to follow, ID to consult in am. 53 year old female with PMH of non-Hodgkin lymphoma (s/p radiation and chemo, 2005), CVA (2005), and depression presented to the ED with complaint of worsening swelling and pain to left ankle s/p surgical mass removal of left ankle, developed post-op infection treated with 4wks of Doxy, poor response, then started on Keflex now on Day #4 outpatient mngnt without improvement. Wound culture +staph aureus sensitive to Cefazolin.  In ED, labs show no leukocytosis, lactate normal, ESR and CRP pending. Left ankle x-ray +soft tissue swelling and small joint effusion. Rt Duplex US - negative for DVT. Given one dose of clindamycin. Seen by otho - advised no acute surgical intervention. ID consulted - will see pt in am.  Pt sent to CDU for therapeutic intensity. Pt is hemodynamically stable, afebrile. non-septic appearing. P: Clindamycin 600mg IV q8h, nsaids, foot elevation, serial foot neuro exam, repeat CBC/lactate, start home meds, vitals q4h, diet, ortho to follow, ID to consult in am.

## 2019-05-16 NOTE — ED ADULT NURSE NOTE - OBJECTIVE STATEMENT
Pt received to the ED c/o left foot cellulitis, pt reports being prescribed cephalexin to no relief. pt a&ox4, 20G placed in left arm, labs drawn and sent, pt medicated as per ordered, will continue to monitor.

## 2019-05-16 NOTE — ED ADULT NURSE NOTE - NSIMPLEMENTINTERV_GEN_ALL_ED
Implemented All Universal Safety Interventions:  Ware to call system. Call bell, personal items and telephone within reach. Instruct patient to call for assistance. Room bathroom lighting operational. Non-slip footwear when patient is off stretcher. Physically safe environment: no spills, clutter or unnecessary equipment. Stretcher in lowest position, wheels locked, appropriate side rails in place.

## 2019-05-17 LAB
BASE EXCESS BLDV CALC-SCNC: 4.7 MMOL/L — SIGNIFICANT CHANGE UP
BASOPHILS # BLD AUTO: 0.04 K/UL — SIGNIFICANT CHANGE UP (ref 0–0.2)
BASOPHILS NFR BLD AUTO: 0.6 % — SIGNIFICANT CHANGE UP (ref 0–2)
BLOOD GAS VENOUS - CREATININE: 0.64 MG/DL — SIGNIFICANT CHANGE UP (ref 0.5–1.3)
BLOOD GAS VENOUS - FIO2: 21 — SIGNIFICANT CHANGE UP
CHLORIDE BLDV-SCNC: 105 MMOL/L — SIGNIFICANT CHANGE UP (ref 96–108)
EOSINOPHIL # BLD AUTO: 0.34 K/UL — SIGNIFICANT CHANGE UP (ref 0–0.5)
EOSINOPHIL NFR BLD AUTO: 5.3 % — SIGNIFICANT CHANGE UP (ref 0–6)
ERYTHROCYTE [SEDIMENTATION RATE] IN BLOOD: 20 MM/HR — SIGNIFICANT CHANGE UP (ref 4–25)
GAS PNL BLDV: 138 MMOL/L — SIGNIFICANT CHANGE UP (ref 136–146)
GLUCOSE BLDV-MCNC: 89 MG/DL — SIGNIFICANT CHANGE UP (ref 70–99)
HCO3 BLDV-SCNC: 28 MMOL/L — HIGH (ref 20–27)
HCT VFR BLD CALC: 37.7 % — SIGNIFICANT CHANGE UP (ref 34.5–45)
HCT VFR BLDV CALC: 38.2 % — SIGNIFICANT CHANGE UP (ref 34.5–45)
HGB BLD-MCNC: 12.3 G/DL — SIGNIFICANT CHANGE UP (ref 11.5–15.5)
HGB BLDV-MCNC: 12.4 G/DL — SIGNIFICANT CHANGE UP (ref 11.5–15.5)
IMM GRANULOCYTES NFR BLD AUTO: 0.2 % — SIGNIFICANT CHANGE UP (ref 0–1.5)
LACTATE BLDV-MCNC: 0.5 MMOL/L — SIGNIFICANT CHANGE UP (ref 0.5–2)
LYMPHOCYTES # BLD AUTO: 1.76 K/UL — SIGNIFICANT CHANGE UP (ref 1–3.3)
LYMPHOCYTES # BLD AUTO: 27.2 % — SIGNIFICANT CHANGE UP (ref 13–44)
MCHC RBC-ENTMCNC: 30.1 PG — SIGNIFICANT CHANGE UP (ref 27–34)
MCHC RBC-ENTMCNC: 32.6 % — SIGNIFICANT CHANGE UP (ref 32–36)
MCV RBC AUTO: 92.4 FL — SIGNIFICANT CHANGE UP (ref 80–100)
MONOCYTES # BLD AUTO: 0.69 K/UL — SIGNIFICANT CHANGE UP (ref 0–0.9)
MONOCYTES NFR BLD AUTO: 10.7 % — SIGNIFICANT CHANGE UP (ref 2–14)
NEUTROPHILS # BLD AUTO: 3.62 K/UL — SIGNIFICANT CHANGE UP (ref 1.8–7.4)
NEUTROPHILS NFR BLD AUTO: 56 % — SIGNIFICANT CHANGE UP (ref 43–77)
NRBC # FLD: 0 K/UL — SIGNIFICANT CHANGE UP (ref 0–0)
PCO2 BLDV: 49 MMHG — SIGNIFICANT CHANGE UP (ref 41–51)
PH BLDV: 7.4 PH — SIGNIFICANT CHANGE UP (ref 7.32–7.43)
PLATELET # BLD AUTO: 287 K/UL — SIGNIFICANT CHANGE UP (ref 150–400)
PMV BLD: 8.9 FL — SIGNIFICANT CHANGE UP (ref 7–13)
PO2 BLDV: 82 MMHG — HIGH (ref 35–40)
POTASSIUM BLDV-SCNC: 3.7 MMOL/L — SIGNIFICANT CHANGE UP (ref 3.4–4.5)
RBC # BLD: 4.08 M/UL — SIGNIFICANT CHANGE UP (ref 3.8–5.2)
RBC # FLD: 11.9 % — SIGNIFICANT CHANGE UP (ref 10.3–14.5)
SAO2 % BLDV: 96.4 % — HIGH (ref 60–85)
SPECIMEN SOURCE: SIGNIFICANT CHANGE UP
SPECIMEN SOURCE: SIGNIFICANT CHANGE UP
WBC # BLD: 6.46 K/UL — SIGNIFICANT CHANGE UP (ref 3.8–10.5)
WBC # FLD AUTO: 6.46 K/UL — SIGNIFICANT CHANGE UP (ref 3.8–10.5)

## 2019-05-17 PROCEDURE — 73722 MRI JOINT OF LWR EXTR W/DYE: CPT | Mod: 26,LT

## 2019-05-17 RX ORDER — QUETIAPINE FUMARATE 200 MG/1
100 TABLET, FILM COATED ORAL ONCE
Refills: 0 | Status: COMPLETED | OUTPATIENT
Start: 2019-05-17 | End: 2019-05-17

## 2019-05-17 RX ORDER — QUETIAPINE FUMARATE 200 MG/1
200 TABLET, FILM COATED ORAL AT BEDTIME
Refills: 0 | Status: DISCONTINUED | OUTPATIENT
Start: 2019-05-17 | End: 2019-05-30

## 2019-05-17 RX ORDER — LACTOBACILLUS ACIDOPHILUS 100MM CELL
1 CAPSULE ORAL DAILY
Refills: 0 | Status: DISCONTINUED | OUTPATIENT
Start: 2019-05-17 | End: 2019-05-19

## 2019-05-17 RX ADMIN — Medication 25 MILLIGRAM(S): at 22:01

## 2019-05-17 RX ADMIN — Medication 100 MILLIGRAM(S): at 13:14

## 2019-05-17 RX ADMIN — Medication 100 MILLIGRAM(S): at 22:01

## 2019-05-17 RX ADMIN — Medication 100 MILLIGRAM(S): at 06:17

## 2019-05-17 RX ADMIN — QUETIAPINE FUMARATE 200 MILLIGRAM(S): 200 TABLET, FILM COATED ORAL at 22:01

## 2019-05-17 RX ADMIN — Medication 600 MILLIGRAM(S): at 13:45

## 2019-05-17 RX ADMIN — QUETIAPINE FUMARATE 100 MILLIGRAM(S): 200 TABLET, FILM COATED ORAL at 01:56

## 2019-05-17 NOTE — CONSULT NOTE ADULT - SUBJECTIVE AND OBJECTIVE BOX
Elif Herman - [phone]    Patient is a 53y old  Female who presents with a chief complaint of cellulitis    HPI:  53F hx arthritis, NHL s/p chemo/RT in remission .  Recently, 3/22/19 noted to have ankle mass that was removed and was discharged with keflex.  Intra-op cultures with MSSA.  Was seen by Dr. Richter (at [place]).  Was given doxycycline for 4 weeks.  She completed this on 19.  By 5/10/19 - she noted her left foot/ankle had more erythema, swelling and pain.  Went to ER - 19 - seen by ortho and did not feel septic joint but likely a celluiltis.  She was discharged from ER on oral keflex.  Presented again because she did not feel better.    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, on drops to prevenyt glaucoma, the pressure is not high&quot;  Depression  History of seizure: x1-  H/O lymph node biopsy: &quot;mediastinal&quot;-  History of appendectomy: as a child  History of tonsillectomy: as a child    Allergies  penicillin (Rash)    ANTIMICROBIALS:    clindamycin IVPB 600 every 8 hours (-)    OTHER MEDS: MEDICATIONS  (STANDING):  amitriptyline 25 at bedtime  QUEtiapine 100 at bedtime    SOCIAL HISTORY:       FAMILY HISTORY:  Family history of TIAs: father   FH: diabetes mellitus: father   FH: HTN (hypertension): father , mother 83 yo    REVIEW OF SYSTEMS  [  ] ROS unobtainable because:    [  ] All other systems negative except as noted below:	    Constitutional:  [ ] fever [ ] chills  [ ] weight loss  [ ] weakness  Skin:  [ ] rash [ ] phlebitis	  Eyes: [ ] icterus [ ] pain  [ ] discharge	  ENMT: [ ] sore throat  [ ] thrush [ ] ulcers [ ] exudates  Respiratory: [ ] dyspnea [ ] hemoptysis [ ] cough [ ] sputum	  Cardiovascular:  [ ] chest pain [ ] palpitations [ ] edema	  Gastrointestinal:  [ ] nausea [ ] vomiting [ ] diarrhea [ ] constipation [ ] pain	  Genitourinary:  [ ] dysuria [ ] frequency [ ] hematuria [ ] discharge [ ] flank pain  [ ] incontinence  Musculoskeletal:  [ ] myalgias [ ] arthralgias [ ] arthritis  [ ] back pain  Neurological:  [ ] headache [ ] seizures  [ ] confusion/altered mental status  Psychiatric:  [ ] anxiety [ ] depression	  Hematology/Lymphatics:  [ ] lymphadenopathy  Endocrine:  [ ] adrenal [ ] thyroid  Allergic/Immunologic:	 [ ] transplant [ ] seasonal    Vital Signs Last 24 Hrs  T(F): 97.1 (19 @ 10:15), Max: 98.1 (19 @ 19:23)    Vital Signs Last 24 Hrs  HR: 102 (19 @ 10:15) (85 - 102)  BP: 95/60 (19 @ 10:15) (95/60 - 119/64)  RR: 14 (19 @ 10:15)  SpO2: 96% (19 @ 10:15) (96% - 100%)  Wt(kg): --                              12.3   6.46  )-----------( 287      ( 17 May 2019 03:57 )             37.7     141  |  101  |  19  ----------------------------<  90  4.2   |  29  |  0.68    Ca    9.8      16 May 2019 20:51    Surgical Pathology Report:   Final Diagnosis  1. Soft tissue mass, left ankle, excision:   - Fibroconnective tissue with degenerated mucin-like material and focal foreign body type giant cell reaction.  2. Soft tissue mass, left ankle, excision:  - Fibroconnective tissue with foci of necrosis, degenerated mucin-like material, granulomatous inflammation, and focal foreign body type giant cell reaction.  - Negative for Fungal organisms (GMS stain).  - AFB stain to be reported as addendum.  (19 @ 20:03)    MICROBIOLOGY:  Culture - Yeast and Fungus (19 @ 20:24)  CULTURE NEGATIVE FOR YEASTS AND MOLDS   AFTER 4 WEEKS    Specimen Source: OTHER    Culture - Surg Site Aerob/Anaer w/Gm St (19 @ 20:24)    Gram Stain Wound:   NOS No Organisms Seen  WBC^White Blood Cells  QNTY CELLS IN GRAM STAIN: RARE (1+)    -  Cefazolin: S <=4 JOSE    -  Ciprofloxacin: S <=1 JOSE    -  Clindamycin: S    -  Erythromycin: S    -  Gentamicin: S <=1 JOSE    -  Levofloxacin: S <=0.5 JOSE    -  Moxifloxacin(Aerobic): S <=0.5 JOSE    -  Oxacillin: S <=0.25 JOSE    -  Rifampin: S <=1 JOSE    -  Tetra/Doxy: S <=1 JOSE    -  Trimethoprim/Sulfamethoxazole: S <=0.5/9.5 JOSE    -  Vancomycin: S 1 JOSE    Culture - Surgical Site:   RARE    Specimen Source: OTHER    Organism Identification: Staphylococcus aureus    RADIOLOGY:  imaging below personally reviewed    Xray Ankle 2 Views, Left (19 @ 21:30) >  IMPRESSION:  Soft tissue swelling of the left ankle. Small joint effusion. No radiographic evidence of osteomyelitis. No acute fracture. Elif Herman - [phone]    Patient is a 53y old  Female who presents with a chief complaint of cellulitis    HPI:  53F hx arthritis, NHL s/p chemo/RT in remission .  Notes in 2017 has ?stem cells injected into L ankle and since then has had this problem. Recently, 3/22/19 noted to have ankle mass that was removed and was discharged with keflex x 1 week.  Intra-op cultures with MSSA.  Was seen by Dr. Richter (at [place]).  Was given doxycycline for 4 weeks.  She completed this on 19.  By 5/10/19 - she noted her left foot/ankle had more erythema, swelling and pain.  Went to ER - 19 - seen by ortho and did not feel septic joint but likely a celluiltis.  She was discharged from ER on oral keflex.  Presented again because she did not feel better.    seen by ortho this admission and they did not feel pt had a septic joint. Pt had a photo from - compared to current state, there is less erythema and some improvement in edema of left foot. No fevers.    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, on drops to prevenyt glaucoma, the pressure is not high&quot;  Depression  History of seizure: x1-2007  H/O lymph node biopsy: &quot;mediastinal&quot;-  History of appendectomy: as a child  History of tonsillectomy: as a child    Allergies  penicillin (Rash)    ANTIMICROBIALS:    clindamycin IVPB 600 every 8 hours (-)    OTHER MEDS: MEDICATIONS  (STANDING):  amitriptyline 25 at bedtime  QUEtiapine 100 at bedtime    SOCIAL HISTORY:       FAMILY HISTORY:  Family history of TIAs: father   FH: diabetes mellitus: father   FH: HTN (hypertension): father , mother 81 yo    REVIEW OF SYSTEMS  [  ] ROS unobtainable because:    [ x ] All other systems negative except as noted below:	    Constitutional:  [ ] fever [ ] chills  [ ] weight loss  [ ] weakness  Skin:  [ ] rash [ ] phlebitis	  Eyes: [ ] icterus [ ] pain  [ ] discharge	  ENMT: [ ] sore throat  [ ] thrush [ ] ulcers [ ] exudates  Respiratory: [ ] dyspnea [ ] hemoptysis [ ] cough [ ] sputum	  Cardiovascular:  [ ] chest pain [ ] palpitations [ ] edema	  Gastrointestinal:  [ ] nausea [ ] vomiting [ ] diarrhea [ ] constipation [ ] pain	  Genitourinary:  [ ] dysuria [ ] frequency [ ] hematuria [ ] discharge [ ] flank pain  [ ] incontinence  Musculoskeletal:  L foot edema, some erythema around left ankle   Neurological:  [ ] headache [ ] seizures  [ ] confusion/altered mental status  Psychiatric:  [ ] anxiety [ ] depression	  Hematology/Lymphatics:  [ ] lymphadenopathy  Endocrine:  [ ] adrenal [ ] thyroid  Allergic/Immunologic:	 [ ] transplant [ ] seasonal    Vital Signs Last 24 Hrs  T(F): 97.1 (19 @ 10:15), Max: 98.1 (19 @ 19:23)    Vital Signs Last 24 Hrs  HR: 102 (19 @ 10:15) (85 - 102)  BP: 95/60 (19 @ 10:15) (95/60 - 119/64)  RR: 14 (19 @ 10:15)  SpO2: 96% (19 @ 10:15) (96% - 100%)  Wt(kg): --                              12.3   6.46  )-----------( 287      ( 17 May 2019 03:57 )             37.7     141  |  101  |  19  ----------------------------<  90  4.2   |  29  |  0.68    Ca    9.8      16 May 2019 20:51    Surgical Pathology Report:   Final Diagnosis  1. Soft tissue mass, left ankle, excision:   - Fibroconnective tissue with degenerated mucin-like material and focal foreign body type giant cell reaction.  2. Soft tissue mass, left ankle, excision:  - Fibroconnective tissue with foci of necrosis, degenerated mucin-like material, granulomatous inflammation, and focal foreign body type giant cell reaction.  - Negative for Fungal organisms (GMS stain).  - AFB stain to be reported as addendum.  (19 @ 20:03)    MICROBIOLOGY:  Culture - Yeast and Fungus (19 @ 20:24)  CULTURE NEGATIVE FOR YEASTS AND MOLDS   AFTER 4 WEEKS    Specimen Source: OTHER    Culture - Surg Site Aerob/Anaer w/Gm St (19 @ 20:24)    Gram Stain Wound:   NOS No Organisms Seen  WBC^White Blood Cells  QNTY CELLS IN GRAM STAIN: RARE (1+)    -  Cefazolin: S <=4 JOSE    -  Ciprofloxacin: S <=1 JOSE    -  Clindamycin: S    -  Erythromycin: S    -  Gentamicin: S <=1 JOSE    -  Levofloxacin: S <=0.5 JOSE    -  Moxifloxacin(Aerobic): S <=0.5 JOSE    -  Oxacillin: S <=0.25 JOSE    -  Rifampin: S <=1 JOSE    -  Tetra/Doxy: S <=1 JOSE    -  Trimethoprim/Sulfamethoxazole: S <=0.5/9.5 JOSE    -  Vancomycin: S 1 JOSE    Culture - Surgical Site:   RARE    Specimen Source: OTHER    Organism Identification: Staphylococcus aureus    RADIOLOGY:  imaging below personally reviewed    Xray Ankle 2 Views, Left (19 @ 21:30) >  IMPRESSION:  Soft tissue swelling of the left ankle. Small joint effusion. No radiographic evidence of osteomyelitis. No acute fracture. Elif Herman - [phone]    Patient is a 53y old  Female who presents with a chief complaint of cellulitis    HPI:  53F hx arthritis, NHL s/p chemo/RT in remission .  Notes in 2017 has ?stem cells injected into L ankle and since then has had this problem. Recently, 3/22/19 noted to have ankle mass that was removed and was discharged with keflex x 1 week.  Intra-op cultures with MSSA.  Was seen by Dr. Richter (at [place]).  Was given doxycycline for 4 weeks.  She completed this on 19.  By 5/10/19 - she noted her left foot/ankle had more erythema, swelling and pain.  Went to ER - 19 - seen by ortho and did not feel septic joint but likely a celluiltis.  She was discharged from ER on oral keflex.  Presented again because she did not feel better.    seen by ortho this admission and they did not feel pt had a septic joint. Pt had a photo from - compared to current state, there is less erythema and some improvement in edema of left foot. No fevers.    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, on drops to prevenyt glaucoma, the pressure is not high&quot;  Depression  History of seizure: x1-2007  H/O lymph node biopsy: &quot;mediastinal&quot;-  History of appendectomy: as a child  History of tonsillectomy: as a child    Allergies  penicillin (Rash)    ANTIMICROBIALS:    clindamycin IVPB 600 every 8 hours (-)    OTHER MEDS: MEDICATIONS  (STANDING):  amitriptyline 25 at bedtime  QUEtiapine 100 at bedtime    SOCIAL HISTORY:   does not smoke; on disability    FAMILY HISTORY:  Family history of TIAs: father   FH: diabetes mellitus: father   FH: HTN (hypertension): father , mother 81 yo    REVIEW OF SYSTEMS  [  ] ROS unobtainable because:    [ x ] All other systems negative except as noted below:	    Constitutional:  [ ] fever [ ] chills  [ ] weight loss  [ ] weakness  Skin:  [ ] rash [ ] phlebitis	  Eyes: [ ] icterus [ ] pain  [ ] discharge	  ENMT: [ ] sore throat  [ ] thrush [ ] ulcers [ ] exudates  Respiratory: [ ] dyspnea [ ] hemoptysis [ ] cough [ ] sputum	  Cardiovascular:  [ ] chest pain [ ] palpitations [ ] edema	  Gastrointestinal:  [ ] nausea [ ] vomiting [ ] diarrhea [ ] constipation [ ] pain	  Genitourinary:  [ ] dysuria [ ] frequency [ ] hematuria [ ] discharge [ ] flank pain  [ ] incontinence  Musculoskeletal:  L foot edema, some erythema around left ankle   Neurological:  [ ] headache [ ] seizures  [ ] confusion/altered mental status  Psychiatric:  [ ] anxiety [ ] depression	  Hematology/Lymphatics:  [ ] lymphadenopathy  Endocrine:  [ ] adrenal [ ] thyroid  Allergic/Immunologic:	 [ ] transplant [ ] seasonal    Vital Signs Last 24 Hrs  T(F): 97.1 (19 @ 10:15), Max: 98.1 (19 @ 19:23)    Vital Signs Last 24 Hrs  HR: 102 (19 @ 10:15) (85 - 102)  BP: 95/60 (19 @ 10:15) (95/60 - 119/64)  RR: 14 (19 @ 10:15)  SpO2: 96% (19 @ 10:15) (96% - 100%)  Wt(kg): --    PHYSICAL EXAM:  General: non-toxic; feisty  HEAD/EYES: anicteric  ENT:  supple  Cardiovascular:   S1, S2  Respiratory:  clear bilaterally  GI:  soft, non-tender, normal bowel sounds  :  no betancur  Musculoskeletal:  swollen L ankle but good ROM; minimal if any erythema; no open lesions; no sores  Neurologic:  grossly non-focal  Skin:  no rash  Psychiatric:  appropriate affect  Vascular:  no phlebitis    Sedimentation Rate, Erythrocyte: 20 (19)  Sedimentation Rate, Erythrocyte: 7 (19)    C-Reactive Protein, Serum: 14.4 (19)                      12.3   6.46  )-----------( 287      ( 17 May 2019 03:57 )             37.7     141  |  101  |  19  ----------------------------<  90  4.2   |  29  |  0.68    Ca    9.8      16 May 2019 20:51    Surgical Pathology Report:   Final Diagnosis  1. Soft tissue mass, left ankle, excision:   - Fibroconnective tissue with degenerated mucin-like material and focal foreign body type giant cell reaction.  2. Soft tissue mass, left ankle, excision:  - Fibroconnective tissue with foci of necrosis, degenerated mucin-like material, granulomatous inflammation, and focal foreign body type giant cell reaction.  - Negative for Fungal organisms (GMS stain).  - AFB stain to be reported as addendum.  (19 @ 20:03)    MICROBIOLOGY:  Culture - Yeast and Fungus (19 @ 20:24)  CULTURE NEGATIVE FOR YEASTS AND MOLDS   AFTER 4 WEEKS    Specimen Source: OTHER    Culture - Surg Site Aerob/Anaer w/Gm St (19 @ 20:24)    Gram Stain Wound:   NOS No Organisms Seen  WBC^White Blood Cells  QNTY CELLS IN GRAM STAIN: RARE (1+)    -  Cefazolin: S <=4 JOSE    -  Ciprofloxacin: S <=1 JOSE    -  Clindamycin: S    -  Erythromycin: S    -  Gentamicin: S <=1 JOSE    -  Levofloxacin: S <=0.5 JOSE    -  Moxifloxacin(Aerobic): S <=0.5 JOSE    -  Oxacillin: S <=0.25 JOSE    -  Rifampin: S <=1 JOSE    -  Tetra/Doxy: S <=1 JOSE    -  Trimethoprim/Sulfamethoxazole: S <=0.5/9.5 JOSE    -  Vancomycin: S 1 JOSE    Culture - Surgical Site:   RARE    Specimen Source: OTHER    Organism Identification: Staphylococcus aureus    RADIOLOGY:  imaging below personally reviewed    Xray Ankle 2 Views, Left (19 @ 21:30) >  IMPRESSION:  Soft tissue swelling of the left ankle. Small joint effusion. No radiographic evidence of osteomyelitis. No acute fracture.

## 2019-05-17 NOTE — ED CDU PROVIDER SUBSEQUENT DAY NOTE - HISTORY
53 year old female with PMH of non-Hodgkin lymphoma (s/p radiation and chemo, 2005), CVA (2005), and depression presented to the ED with complaint of worsening swelling and pain to left ankle s/p surgical mass removal of left ankle, developed post-op infection treated with 4wks of Doxy, poor response, then started on Keflex now on Day #4 outpatient mngnt without improvement. Wound culture +staph aureus sensitive to Cefazolin. Pt is in CDU for therapeutic intensity.   Pt is currently resting, vs stable, in no acute distress.   P: Clindamycin 600mg IV q8h, nsaids, foot elevation, serial foot neuro exam, repeat CBC/lactate, start home meds, vitals q4h, diet, ortho to follow, ID to consult in am

## 2019-05-17 NOTE — ED CDU PROVIDER SUBSEQUENT DAY NOTE - PROGRESS NOTE DETAILS
Patient seen and examined this AM.  Patient noted to have left lateral ankle swelling on exam, with good range of motion with flexion and extension.  Currently no erythema overlying the site.  s/p clindamycin Pt seen by ID, recommend non emergent MRI ankle to r/o underlying abscess vs. osteomyelitis. Will get MRI here as patient has had difficulty following up for the past month. Pt repeatedly refusing Keflex. Has a hx of CDIF sp bactrim and understands clindamycin has the highest risk of Cdiff. State she feels the Clindamycin is helping her and requesting probiotics as well. Pt NAD, VSS, no acute complaints. MRI performed - pending results. Receiving IV abx. Will continue to monitor.

## 2019-05-17 NOTE — CONSULT NOTE ADULT - ASSESSMENT
53F hx arthritis, NHL s/p chemo/RT in remission 2005, hx cdiff recent left ankle mass removal 3/22 p/w L foot/ankle edema and erythema.    - would prefer to switch to keflex; however pt is very resistant to the idea so will c/w clindamycin for now  - follow up bcx  - check MRI foot (can be outpt)     d/w ED attending 53F hx osteoarthritis, treated NHL s/p chemo/RT in remission 2005, hx cdiff recent left ankle mass removal 3/22 p/w L foot/ankle edema and erythema.  No definite infection, however, palpable fluid on exam.  MRI from 1/2019 noted from outside.  No clear if there is synovitis or not, however, does not appear to be a septic joint space.  Little cellulitis.  Would like to rule out deeper infection given recurrence.  Hx Cdiff    Suggest:  - would prefer to switch to keflex; however pt is very resistant to the idea so will c/w clindamycin for now  - follow up bcx  - check MRI foot (can be outpt)     d/w ED attending    Please call the ID service [phone] with questions or concerns over the weekend

## 2019-05-18 DIAGNOSIS — I63.9 CEREBRAL INFARCTION, UNSPECIFIED: ICD-10-CM

## 2019-05-18 DIAGNOSIS — F32.9 MAJOR DEPRESSIVE DISORDER, SINGLE EPISODE, UNSPECIFIED: ICD-10-CM

## 2019-05-18 DIAGNOSIS — Z87.898 PERSONAL HISTORY OF OTHER SPECIFIED CONDITIONS: ICD-10-CM

## 2019-05-18 DIAGNOSIS — C81.90 HODGKIN LYMPHOMA, UNSPECIFIED, UNSPECIFIED SITE: ICD-10-CM

## 2019-05-18 DIAGNOSIS — Z29.9 ENCOUNTER FOR PROPHYLACTIC MEASURES, UNSPECIFIED: ICD-10-CM

## 2019-05-18 DIAGNOSIS — E03.9 HYPOTHYROIDISM, UNSPECIFIED: ICD-10-CM

## 2019-05-18 DIAGNOSIS — M86.9 OSTEOMYELITIS, UNSPECIFIED: ICD-10-CM

## 2019-05-18 DIAGNOSIS — M00.9 PYOGENIC ARTHRITIS, UNSPECIFIED: ICD-10-CM

## 2019-05-18 LAB
ALBUMIN SERPL ELPH-MCNC: 4.3 G/DL — SIGNIFICANT CHANGE UP (ref 3.3–5)
ALP SERPL-CCNC: 100 U/L — SIGNIFICANT CHANGE UP (ref 40–120)
ALT FLD-CCNC: 20 U/L — SIGNIFICANT CHANGE UP (ref 4–33)
ANION GAP SERPL CALC-SCNC: 12 MMO/L — SIGNIFICANT CHANGE UP (ref 7–14)
AST SERPL-CCNC: 26 U/L — SIGNIFICANT CHANGE UP (ref 4–32)
BASE EXCESS BLDV CALC-SCNC: 2.8 MMOL/L — SIGNIFICANT CHANGE UP
BASOPHILS # BLD AUTO: 0.04 K/UL — SIGNIFICANT CHANGE UP (ref 0–0.2)
BASOPHILS NFR BLD AUTO: 0.6 % — SIGNIFICANT CHANGE UP (ref 0–2)
BILIRUB SERPL-MCNC: 0.3 MG/DL — SIGNIFICANT CHANGE UP (ref 0.2–1.2)
BLOOD GAS VENOUS - CREATININE: 0.59 MG/DL — SIGNIFICANT CHANGE UP (ref 0.5–1.3)
BUN SERPL-MCNC: 22 MG/DL — SIGNIFICANT CHANGE UP (ref 7–23)
CALCIUM SERPL-MCNC: 9.5 MG/DL — SIGNIFICANT CHANGE UP (ref 8.4–10.5)
CHLORIDE BLDV-SCNC: 106 MMOL/L — SIGNIFICANT CHANGE UP (ref 96–108)
CHLORIDE SERPL-SCNC: 104 MMOL/L — SIGNIFICANT CHANGE UP (ref 98–107)
CO2 SERPL-SCNC: 26 MMOL/L — SIGNIFICANT CHANGE UP (ref 22–31)
CREAT SERPL-MCNC: 0.78 MG/DL — SIGNIFICANT CHANGE UP (ref 0.5–1.3)
EOSINOPHIL # BLD AUTO: 0.28 K/UL — SIGNIFICANT CHANGE UP (ref 0–0.5)
EOSINOPHIL NFR BLD AUTO: 4.4 % — SIGNIFICANT CHANGE UP (ref 0–6)
GAS PNL BLDV: 134 MMOL/L — LOW (ref 136–146)
GLUCOSE BLDV-MCNC: 90 MG/DL — SIGNIFICANT CHANGE UP (ref 70–99)
GLUCOSE SERPL-MCNC: 91 MG/DL — SIGNIFICANT CHANGE UP (ref 70–99)
HCO3 BLDV-SCNC: 26 MMOL/L — SIGNIFICANT CHANGE UP (ref 20–27)
HCT VFR BLD CALC: 34.9 % — SIGNIFICANT CHANGE UP (ref 34.5–45)
HCT VFR BLDV CALC: 40.2 % — SIGNIFICANT CHANGE UP (ref 34.5–45)
HGB BLD-MCNC: 11.5 G/DL — SIGNIFICANT CHANGE UP (ref 11.5–15.5)
HGB BLDV-MCNC: 13.1 G/DL — SIGNIFICANT CHANGE UP (ref 11.5–15.5)
IMM GRANULOCYTES NFR BLD AUTO: 0.3 % — SIGNIFICANT CHANGE UP (ref 0–1.5)
LACTATE BLDV-MCNC: 1.3 MMOL/L — SIGNIFICANT CHANGE UP (ref 0.5–2)
LYMPHOCYTES # BLD AUTO: 1.39 K/UL — SIGNIFICANT CHANGE UP (ref 1–3.3)
LYMPHOCYTES # BLD AUTO: 21.6 % — SIGNIFICANT CHANGE UP (ref 13–44)
MCHC RBC-ENTMCNC: 31 PG — SIGNIFICANT CHANGE UP (ref 27–34)
MCHC RBC-ENTMCNC: 33 % — SIGNIFICANT CHANGE UP (ref 32–36)
MCV RBC AUTO: 94.1 FL — SIGNIFICANT CHANGE UP (ref 80–100)
MONOCYTES # BLD AUTO: 0.59 K/UL — SIGNIFICANT CHANGE UP (ref 0–0.9)
MONOCYTES NFR BLD AUTO: 9.2 % — SIGNIFICANT CHANGE UP (ref 2–14)
NEUTROPHILS # BLD AUTO: 4.11 K/UL — SIGNIFICANT CHANGE UP (ref 1.8–7.4)
NEUTROPHILS NFR BLD AUTO: 63.9 % — SIGNIFICANT CHANGE UP (ref 43–77)
NRBC # FLD: 0 K/UL — SIGNIFICANT CHANGE UP (ref 0–0)
PCO2 BLDV: 51 MMHG — SIGNIFICANT CHANGE UP (ref 41–51)
PH BLDV: 7.36 PH — SIGNIFICANT CHANGE UP (ref 7.32–7.43)
PLATELET # BLD AUTO: 278 K/UL — SIGNIFICANT CHANGE UP (ref 150–400)
PMV BLD: 9.1 FL — SIGNIFICANT CHANGE UP (ref 7–13)
PO2 BLDV: 138 MMHG — HIGH (ref 35–40)
POTASSIUM BLDV-SCNC: 3.9 MMOL/L — SIGNIFICANT CHANGE UP (ref 3.4–4.5)
POTASSIUM SERPL-MCNC: 4 MMOL/L — SIGNIFICANT CHANGE UP (ref 3.5–5.3)
POTASSIUM SERPL-SCNC: 4 MMOL/L — SIGNIFICANT CHANGE UP (ref 3.5–5.3)
PROT SERPL-MCNC: 7 G/DL — SIGNIFICANT CHANGE UP (ref 6–8.3)
RBC # BLD: 3.71 M/UL — LOW (ref 3.8–5.2)
RBC # FLD: 11.9 % — SIGNIFICANT CHANGE UP (ref 10.3–14.5)
SAO2 % BLDV: 99 % — HIGH (ref 60–85)
SODIUM SERPL-SCNC: 142 MMOL/L — SIGNIFICANT CHANGE UP (ref 135–145)
WBC # BLD: 6.43 K/UL — SIGNIFICANT CHANGE UP (ref 3.8–10.5)
WBC # FLD AUTO: 6.43 K/UL — SIGNIFICANT CHANGE UP (ref 3.8–10.5)

## 2019-05-18 PROCEDURE — 99222 1ST HOSP IP/OBS MODERATE 55: CPT | Mod: GC

## 2019-05-18 PROCEDURE — 99223 1ST HOSP IP/OBS HIGH 75: CPT | Mod: GC

## 2019-05-18 RX ORDER — OXYCODONE HYDROCHLORIDE 5 MG/1
5 TABLET ORAL EVERY 4 HOURS
Refills: 0 | Status: DISCONTINUED | OUTPATIENT
Start: 2019-05-18 | End: 2019-05-23

## 2019-05-18 RX ORDER — ACETAMINOPHEN 500 MG
650 TABLET ORAL EVERY 6 HOURS
Refills: 0 | Status: DISCONTINUED | OUTPATIENT
Start: 2019-05-18 | End: 2019-05-30

## 2019-05-18 RX ADMIN — QUETIAPINE FUMARATE 200 MILLIGRAM(S): 200 TABLET, FILM COATED ORAL at 21:36

## 2019-05-18 RX ADMIN — Medication 100 MILLIGRAM(S): at 21:36

## 2019-05-18 RX ADMIN — Medication 25 MILLIGRAM(S): at 21:36

## 2019-05-18 RX ADMIN — Medication 100 MILLIGRAM(S): at 06:11

## 2019-05-18 RX ADMIN — Medication 1 TABLET(S): at 13:05

## 2019-05-18 RX ADMIN — Medication 100 MILLIGRAM(S): at 13:05

## 2019-05-18 NOTE — H&P ADULT - PROBLEM SELECTOR PLAN 3
-start statin and ASA, f/u with outpt pmd -discussed ASA and statin with patient and declines to take medication, patient will discuss with her pmd

## 2019-05-18 NOTE — ED CDU PROVIDER DISPOSITION NOTE - CLINICAL COURSE
53 year old female with a PMHx of non-Hodgkin lymphoma (s/p radiation and chemo, 2005), CVA (2005), and depression pw worsening swelling and pain to left ankle after surgical mass removal of left ankle in 3/19. Pt had previously been on doxy x4 weeks and was given Keflex had total of 8 doses. No acute intervention as per ortho. ID recommends continuing on clinda. Had MRI done which showed abscess and evidence of septic arthritis.  Ortho consulted and pt to be admitted

## 2019-05-18 NOTE — H&P ADULT - ASSESSMENT
53F h/o CVA and seizure d/o, hodgkin's lymphoma c/b acquired hypothyroidism, depression, s/p left ankle cyst/abscess removal March 2019, complete outpt course of doxy now p/w with recurrent infection s/p failure to improve on outpt keflex, a/f LLE osteo, septic arthritis recurrent abscess: 53F h/o Cdiff, CVA and seizure d/o, hodgkin's lymphoma c/b acquired hypothyroidism, depression, s/p left ankle cyst/abscess removal March 2019, complete outpt course of doxy now p/w with recurrent infection s/p failure to improve on outpt keflex, a/f LLE osteo, septic arthritis recurrent abscess:

## 2019-05-18 NOTE — H&P ADULT - PROBLEM SELECTOR PLAN 1
-recurrent infection after failing repeat trial of PO abx, will c/w IV clindamycin, likely will require prolonged course of abx given severity of infection  -ortho and ID followingm, appreciate their recs   -d/w ID in am if need to change coverage of abx, however patient has remained afebrile and with no leukocytosis since admission   -d/w ortho in am regarding drainage of ankle  -tylenol and oxycodone prn for pain control -recurrent infection after failing repeat trial of PO abx, will c/w IV clindamycin, likely will require prolonged course of abx given severity of infection  -ortho and ID followingm, appreciate their recs   -d/w ID in am if need to change coverage of abx, however patient has remained afebrile and with no leukocytosis since admission   -d/w ortho in am regarding drainage of ankle  -tylenol and oxycodone prn for pain control  -LLE duplex neg for dVT

## 2019-05-18 NOTE — H&P ADULT - HISTORY OF PRESENT ILLNESS
53F h/o CVA and seizure d/o, hodgkin's lymphoma c/b acquired hypothyroidism, depression, p/w worsening LLE swelling and redness over the past 1 week. patient is s/p excision of L ankle infected mass by ortho Dr. Cain on 3/28. Completed a 4 week course of doxycyline on 5/8 based on cultures, with improvement of swelling/erythema. Swelling recurred on Friday 5/10 and has been worsening since then. Seen in the ER on Sunday 5/12, started on keflex abx. Now with progressively worsening LLE pain, redness. No fevers/chills, CP, SOB, recent trauma to LLE, drainage from LLE. 53F h/o CVA and seizure d/o, hodgkin's lymphoma c/b acquired hypothyroidism, depression, p/w worsening LLE swelling and redness over the past 1 week. patient is s/p excision of L ankle infected mass by ortho Dr. Cain on 3/28. Cultures grew MSSA. Completed a 4 week course of doxycyline on 5/8 based on cultures, with improvement of swelling/erythema. Swelling recurred on Friday 5/10 and has been worsening since then. Seen in the ER on Sunday 5/12, started on keflex abx. Now with progressively worsening LLE pain, redness. The swelling is localized to left lateral ankle where she had surgery. The ankle pain is throbbing and worse with walking. No changes in strength or sensation. She is able to bear weight but has been using crutches. No fevers/chills, CP, SOB, recent trauma to LLE, drainage from LLE.  A 53F h/o CVA and seizure d/o, hodgkin's lymphoma c/b acquired hypothyroidism, depression, p/w worsening LLE swelling and redness over the past 1 week. patient is s/p excision of L ankle infected mass by ortho Dr. Cain on 3/28. Cultures grew MSSA. Completed a 4 week course of doxycyline on 5/8 based on cultures, with improvement of swelling/erythema. Swelling recurred on Friday 5/10 and has been worsening since then. Seen in the ER on Sunday 5/12, started on keflex abx. Now with progressively worsening LLE pain, redness. The swelling is localized to left lateral ankle where she had surgery. The ankle pain is throbbing and worse with walking. No changes in strength or sensation. She is able to bear weight but has been using crutches. No fevers/chills, CP, SOB, recent trauma to LLE, drainage from LLE. 53F h/o Cdiff, CVA and seizure d/o, hodgkin's lymphoma c/b acquired hypothyroidism, depression, p/w worsening LLE swelling and redness over the past 1 week. patient is s/p excision of L ankle infected mass by ortho Dr. Cain on 3/28. Cultures grew MSSA. Completed a 4 week course of doxycyline on 5/8 based on cultures, with improvement of swelling/erythema. Swelling recurred on Friday 5/10 and has been worsening since then. Seen in the ER on Sunday 5/12, started on keflex abx. Now with progressively worsening LLE pain, redness. The swelling is localized to left lateral ankle where she had surgery. The ankle pain is throbbing and worse with walking. No changes in strength or sensation. She is able to bear weight but has been using crutches. No fevers/chills, CP, SOB, recent trauma to LLE, drainage from LLE.

## 2019-05-18 NOTE — ED CDU PROVIDER SUBSEQUENT DAY NOTE - ATTENDING CONTRIBUTION TO CARE
Gong: I have seen and examined the patient face to face, have reviewed and addended the HPI, PE and a/p as necessary.     52 yo F NHL s/p radiation and chemo, cva in 2005, and depression a/w acute on chronic swelling of L ankle after surgical removal of mass over L ankle on 3/28 with ccb post-op infection treated with doxy x 4 weeks, now on day 4 of keflex with no response.  Pt started on clinda here with mild improvement in swelling.      GEN - NAD; well appearing; A+O x3; non-toxic appearing CARD -s1s2, RRR, no M,G,R;   PULM - CTA b/l, symmetric breath sounds;   ABD:  +BS, ND, NT, soft, no guarding, no rebound, no masses;   BACK: no CVA tenderness, Normal  spine;   EXT: symmetric pulses, 2+ dp, capillary refill < 2 seconds, no clubbing, no cyanosis, no edema   L ankle, slight swelling over the lateral mall extending down to the base of the foot.  no erythema.      52 yo F NHL s/p radiation and chemo, cva in 2005, and depression a/w acute on chronic swelling of L ankle after surgical removal of mass over L ankle on 3/28 ccb post-op infection s/p doxy x 4 weeks, and keflex 4 days, clindamycin with mild improvement here.  awaiting for ID consult today.
agree with PA note  "53 year old female with a PMHx of non-Hodgkin lymphoma (s/p radiation and chemo, 2005), CVA (2005), and depression pw worsening swelling and pain to left ankle after surgical mass removal of left ankle in 3/19. Pt had previously been on doxy x4 weeks and was given Keflex had total of 8 doses. No acute intervention as per ortho. ID recommends continuing on clinda. Had MRI done - pending results."  No overnight events.  Pt denies fever, able to range ankle but states does hurt to walk on    PE: pt upset over the chronicity of ankle issues but well appearing; VSS; CTAB/L; s1 s2 no m/r/g abd soft/NT/ND ext: left ankle mild inflammation and erythema, able to move ankle in all directions; calf firm and tender    pending MRI results to r/o septic arthritis/osteo; receiving abx

## 2019-05-18 NOTE — CHART NOTE - NSCHARTNOTEFT_GEN_A_CORE
Case discussed with Dr. Cain. He will come in and evaluate the patient tomorrow or Monday and discuss options with the patient. No acute surgical intervention indicated

## 2019-05-18 NOTE — H&P ADULT - NSHPPHYSICALEXAM_GEN_ALL_CORE
Vital Signs Last 24 Hrs  T(C): 36.9 (05-18-19 @ 23:35), Max: 36.9 (05-18-19 @ 23:35)  T(F): 98.5 (05-18-19 @ 23:35), Max: 98.5 (05-18-19 @ 23:35)  HR: 100 (05-18-19 @ 23:35) (89 - 102)  BP: 109/82 (05-18-19 @ 23:35) (100/51 - 121/73)  BP(mean): --  RR: 16 (05-18-19 @ 23:35) (16 - 17)  SpO2: 100% (05-18-19 @ 23:35) (96% - 100%)    GENERAL: NAD  HEENT: EOMI, MMM, no oropharyngeal lesions or erythema appreciated  Pulm: normal work of breathing, CTABL  CV: RRR, S1&S2+, no m/r/g appreciated  ABDOMEN: soft, nt, nd,   MSK: nl ROM  EXTREMITIES:    LLE- LLE:  Swelling, erythema, over lateral ankle  Well healed surgical scar   Warmth and soft tissue swelling palpated  No fluctuance or crepitus felt over lateral ankle   strength and sensation of LLE intact, +2 DPP  Neuro: A&Ox3, no focal deficits

## 2019-05-18 NOTE — ED CDU PROVIDER SUBSEQUENT DAY NOTE - TEMPLATE, MLM
General Melolabial Transposition Flap Text: The defect edges were debeveled with a #15 scalpel blade.  Given the location of the defect and the proximity to free margins a melolabial flap was deemed most appropriate.  Using a sterile surgical marker, an appropriate melolabial transposition flap was drawn incorporating the defect.    The area thus outlined was incised deep to adipose tissue with a #15 scalpel blade.  The skin margins were undermined to an appropriate distance in all directions utilizing iris scissors.

## 2019-05-18 NOTE — H&P ADULT - ATTENDING COMMENTS
Pt with concern for septic arthritis of ankle admitted from CDU.  Pt reports swelling of ankle slightly decreased, but pain and difficulty bearing weight remain.    On exam, Ankle warm and tender with edema.  Heart RRR without murmur.  Will f/u ID and orthopedics recs  Continue IV clindamycin

## 2019-05-18 NOTE — ED CDU PROVIDER SUBSEQUENT DAY NOTE - HISTORY
53 year old female with a PMHx of non-Hodgkin lymphoma (s/p radiation and chemo, 2005), CVA (2005), and depression pw worsening swelling and pain to left ankle after surgical mass removal of left ankle in 3/19. Pt had previously been on doxy x4 weeks and was given Keflex had total of 8 doses. No acute intervention as per ortho. ID recommends continuing on clinda. Had MRI done - pending results.

## 2019-05-18 NOTE — H&P ADULT - NSHPREVIEWOFSYSTEMS_GEN_ALL_CORE
REVIEW OF SYSTEMS:    CONSTITUTIONAL: No weakness, fevers or chills  EYES/ENT: No visual changes;  no throat pain   NECK: No pain or stiffness  RESPIRATORY: No cough, wheezing, hemoptysis; No shortness of breath  CARDIOVASCULAR: No chest pain or palpitations  GASTROINTESTINAL: No abdominal or epigastric pain. No nausea, vomiting, or hematemesis; No diarrhea or constipation. No melena or hematochezia.  GENITOURINARY: No dysuria, change in frequency or hematuria  NEUROLOGICAL: No numbness or weakness  SKIN: LLE redness, swelling  All other review of systems is negative unless indicated above.

## 2019-05-18 NOTE — ED CDU PROVIDER SUBSEQUENT DAY NOTE - MEDICAL DECISION MAKING DETAILS
53 year old female with PMH of non-Hodgkin lymphoma (s/p radiation and chemo, 2005), CVA (2005), and depression presented to the ED with complaint of worsening swelling and pain to left ankle s/p surgical mass removal of left ankle, developed post-op infection treated with 4wks of Doxy, poor response, then started on Keflex now on Day #4 outpatient mngnt without improvement. Wound culture +staph aureus sensitive to Cefazolin. Pt is in CDU for therapeutic intensity.   Pt is currently resting, vs stable, in no acute distress.   P: Clindamycin 600mg IV q8h, nsaids, foot elevation, serial foot neuro exam, repeat CBC/lactate, start home meds, vitals q4h, diet, ortho to follow, ID to consult in am
53 year old female with PMH of non-Hodgkin lymphoma (s/p radiation and chemo, 2005), CVA (2005), and depression presented to the ED with complaint of worsening swelling and pain to left ankle s/p surgical mass removal of left ankle.  Plan: iv abx, mri, ortho f/u

## 2019-05-18 NOTE — ED CDU PROVIDER SUBSEQUENT DAY NOTE - PROGRESS NOTE DETAILS
Pt refusing morning labs. MRI results with abscess and osteomyelitis. paged ID and ortho. ortho resident to speak to her attng received sign out from ZACHARY Han. pt seen and examined by Dr. Cerda and BATOOL. pain well controlled. exam: +left lateral mallelar swelling. no erythema. no calf tenderness. neurovascularly intact. pending MRI to r/o osteomyelitis. as per ortho, suggesting to admit to hospitalist. spoke with LOLIS Maddox pagelissy

## 2019-05-18 NOTE — H&P ADULT - NSHPLABSRESULTS_GEN_ALL_CORE
11.5   6.43  )-----------( 278      ( 18 May 2019 17:00 )             34.9       05-18    142  |  104  |  22  ----------------------------<  91  4.0   |  26  |  0.78    Ca    9.5      18 May 2019 17:00    TPro  7.0  /  Alb  4.3  /  TBili  0.3  /  DBili  x   /  AST  26  /  ALT  20  /  AlkPhos  100  05-18            Cultures: blood culture 5/16 NGTD    Radiology:  < from: Xray Ankle 2 Views, Left (05.16.19 @ 21:30) >    FINDINGS:  There is moderate swellingof the left ankle joint. There is a small   joint effusion. There is no subcutaneous air. There is no cortical   erosion or periosteal reaction to suggest osteomyelitis. There is no   acute fracture or dislocation of the left ankle. The joint spaces are   preserved. An os trigonum is incidentally noted.    IMPRESSION:  Soft tissue swelling of the left ankle. Small joint effusion. No   radiographic evidence of osteomyelitis. No acute fracture.  < end of copied text >    < from: MR Ankle w/ IV Cont, Left (05.17.19 @ 19:55) >      Findings:     There is an amorphous heterogeneously hyperintense T1 and T2 collection   spanning 5.8 x 2.2 x 1.3 cm which begins proximally at the level of the   fibular shaft within the anterolateral subcutaneous tissues and then   extends anteriorly adjacent to the fibula and ankle ligaments. Given the   clinical history, this finding is most consistent with an abscess which   may be highly proteinaceous or may have elements of hemorrhage within it.     There is cystic change at the medial talar dome with superimposed   heterogeneous marrow signal greatest at the lateral talus and talar dome   which is consistent with osteomyelitis. There is also marrow edema within   the tibial plafond laterally and to a lesser extent within the medial   malleolus and distal fibula where there may be small distal erosions.   Given the extent of marrow signal abnormality the findings are suggestive   of osteomyelitis despite possible underlying postoperative change. There   is an area that does not enhance within the lateral talar dome which is   suspicious for superimposed ischemia or infarction. The marrow edema   includes almost all of the talus with sparing at the talar head.    There is a large ankle joint effusion with marked synovitis favored to be   related to septic arthritis over reactive change. There is subcutaneous   edema laterally and mild intramuscular edema at the lower leg. There is   edema within the sinus Tarsi.    There is a small spur and mild thickening at the middle cord of plantar   fascia which is likely chronic.    Impression:   5.8 x 2.2x 1.3 cm abscess anterior laterally at the level of the ankle.   Large tibiotalar joint effusion with marked synovitis and marrow signal   changes within the talus, distal tibia and distal fibula suggestive of   septic arthritis/osteomyelitis.  Area of nonenhancement within the lateral talus suspicious for ischemia   or infarction.    < end of copied text >    EKG:    no admission EKG

## 2019-05-18 NOTE — H&P ADULT - NSICDXPASTMEDICALHX_GEN_ALL_CORE_FT
PAST MEDICAL HISTORY:  Acquired hypothyroidism "from radiation", pt. 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

## 2019-05-19 LAB
ANION GAP SERPL CALC-SCNC: 11 MMO/L — SIGNIFICANT CHANGE UP (ref 7–14)
BASOPHILS # BLD AUTO: 0.04 K/UL — SIGNIFICANT CHANGE UP (ref 0–0.2)
BASOPHILS NFR BLD AUTO: 0.6 % — SIGNIFICANT CHANGE UP (ref 0–2)
BUN SERPL-MCNC: 20 MG/DL — SIGNIFICANT CHANGE UP (ref 7–23)
CALCIUM SERPL-MCNC: 9.3 MG/DL — SIGNIFICANT CHANGE UP (ref 8.4–10.5)
CHLORIDE SERPL-SCNC: 104 MMOL/L — SIGNIFICANT CHANGE UP (ref 98–107)
CO2 SERPL-SCNC: 26 MMOL/L — SIGNIFICANT CHANGE UP (ref 22–31)
CREAT SERPL-MCNC: 0.64 MG/DL — SIGNIFICANT CHANGE UP (ref 0.5–1.3)
EOSINOPHIL # BLD AUTO: 0.35 K/UL — SIGNIFICANT CHANGE UP (ref 0–0.5)
EOSINOPHIL NFR BLD AUTO: 5.2 % — SIGNIFICANT CHANGE UP (ref 0–6)
GLUCOSE SERPL-MCNC: 105 MG/DL — HIGH (ref 70–99)
HCT VFR BLD CALC: 36.6 % — SIGNIFICANT CHANGE UP (ref 34.5–45)
HCV AB S/CO SERPL IA: 0.07 S/CO — SIGNIFICANT CHANGE UP (ref 0–0.99)
HCV AB SERPL-IMP: SIGNIFICANT CHANGE UP
HGB BLD-MCNC: 11.9 G/DL — SIGNIFICANT CHANGE UP (ref 11.5–15.5)
IMM GRANULOCYTES NFR BLD AUTO: 0.4 % — SIGNIFICANT CHANGE UP (ref 0–1.5)
LYMPHOCYTES # BLD AUTO: 1.63 K/UL — SIGNIFICANT CHANGE UP (ref 1–3.3)
LYMPHOCYTES # BLD AUTO: 24.2 % — SIGNIFICANT CHANGE UP (ref 13–44)
MAGNESIUM SERPL-MCNC: 1.9 MG/DL — SIGNIFICANT CHANGE UP (ref 1.6–2.6)
MCHC RBC-ENTMCNC: 30.1 PG — SIGNIFICANT CHANGE UP (ref 27–34)
MCHC RBC-ENTMCNC: 32.5 % — SIGNIFICANT CHANGE UP (ref 32–36)
MCV RBC AUTO: 92.7 FL — SIGNIFICANT CHANGE UP (ref 80–100)
MONOCYTES # BLD AUTO: 0.75 K/UL — SIGNIFICANT CHANGE UP (ref 0–0.9)
MONOCYTES NFR BLD AUTO: 11.1 % — SIGNIFICANT CHANGE UP (ref 2–14)
NEUTROPHILS # BLD AUTO: 3.94 K/UL — SIGNIFICANT CHANGE UP (ref 1.8–7.4)
NEUTROPHILS NFR BLD AUTO: 58.5 % — SIGNIFICANT CHANGE UP (ref 43–77)
NRBC # FLD: 0 K/UL — SIGNIFICANT CHANGE UP (ref 0–0)
PHOSPHATE SERPL-MCNC: 3.8 MG/DL — SIGNIFICANT CHANGE UP (ref 2.5–4.5)
PLATELET # BLD AUTO: 284 K/UL — SIGNIFICANT CHANGE UP (ref 150–400)
PMV BLD: 8.9 FL — SIGNIFICANT CHANGE UP (ref 7–13)
POTASSIUM SERPL-MCNC: 3.6 MMOL/L — SIGNIFICANT CHANGE UP (ref 3.5–5.3)
POTASSIUM SERPL-SCNC: 3.6 MMOL/L — SIGNIFICANT CHANGE UP (ref 3.5–5.3)
RBC # BLD: 3.95 M/UL — SIGNIFICANT CHANGE UP (ref 3.8–5.2)
RBC # FLD: 11.9 % — SIGNIFICANT CHANGE UP (ref 10.3–14.5)
SODIUM SERPL-SCNC: 141 MMOL/L — SIGNIFICANT CHANGE UP (ref 135–145)
T3 SERPL-MCNC: 88.9 NG/DL — SIGNIFICANT CHANGE UP (ref 80–200)
T4 FREE SERPL-MCNC: 0.92 NG/DL — SIGNIFICANT CHANGE UP (ref 0.9–1.8)
TSH SERPL-MCNC: 5.23 UIU/ML — HIGH (ref 0.27–4.2)
WBC # BLD: 6.74 K/UL — SIGNIFICANT CHANGE UP (ref 3.8–10.5)
WBC # FLD AUTO: 6.74 K/UL — SIGNIFICANT CHANGE UP (ref 3.8–10.5)

## 2019-05-19 PROCEDURE — 99233 SBSQ HOSP IP/OBS HIGH 50: CPT

## 2019-05-19 RX ORDER — LEVOTHYROXINE SODIUM 125 MCG
25 TABLET ORAL DAILY
Refills: 0 | Status: DISCONTINUED | OUTPATIENT
Start: 2019-05-19 | End: 2019-05-30

## 2019-05-19 RX ORDER — LACTOBACILLUS ACIDOPHILUS 100MM CELL
1 CAPSULE ORAL
Refills: 0 | Status: DISCONTINUED | OUTPATIENT
Start: 2019-05-19 | End: 2019-05-30

## 2019-05-19 RX ADMIN — Medication 100 MILLIGRAM(S): at 06:04

## 2019-05-19 RX ADMIN — Medication 25 MILLIGRAM(S): at 22:20

## 2019-05-19 RX ADMIN — Medication 100 MILLIGRAM(S): at 14:31

## 2019-05-19 RX ADMIN — Medication 1 TABLET(S): at 06:04

## 2019-05-19 RX ADMIN — Medication 1 TABLET(S): at 18:11

## 2019-05-19 RX ADMIN — QUETIAPINE FUMARATE 200 MILLIGRAM(S): 200 TABLET, FILM COATED ORAL at 22:20

## 2019-05-19 RX ADMIN — Medication 100 MILLIGRAM(S): at 22:20

## 2019-05-19 NOTE — PROGRESS NOTE ADULT - SUBJECTIVE AND OBJECTIVE BOX
Patient is a 53y old  Female who presents with a chief complaint of ankle swelling (18 May 2019 23:30)      SUBJECTIVE / OVERNIGHT EVENTS:    MEDICATIONS  (STANDING):  amitriptyline 25 milliGRAM(s) Oral at bedtime  clindamycin IVPB 600 milliGRAM(s) IV Intermittent every 8 hours  lactobacillus acidophilus 1 Tablet(s) Oral two times a day  QUEtiapine 200 milliGRAM(s) Oral at bedtime    MEDICATIONS  (PRN):  acetaminophen   Tablet .. 650 milliGRAM(s) Oral every 6 hours PRN Mild Pain (1 - 3)  oxyCODONE    IR 5 milliGRAM(s) Oral every 4 hours PRN moderate to severe pain (4-10)      Vital Signs Last 24 Hrs  T(C): 36.3 (19 May 2019 10:52), Max: 36.9 (18 May 2019 23:35)  T(F): 97.4 (19 May 2019 10:52), Max: 98.5 (18 May 2019 23:35)  HR: 100 (19 May 2019 10:52) (80 - 104)  BP: 109/63 (19 May 2019 10:52) (95/63 - 121/73)  BP(mean): --  RR: 18 (19 May 2019 10:52) (16 - 18)  SpO2: 100% (19 May 2019 10:52) (96% - 100%)  CAPILLARY BLOOD GLUCOSE        I&O's Summary      PHYSICAL EXAM:  GENERAL: NAD, well-developed  HEAD:  Atraumatic, Normocephalic  EYES: EOMI, PERRLA, conjunctiva and sclera clear  NECK: Supple, No JVD  CHEST/LUNG: Clear to auscultation bilaterally; No wheeze  HEART: Regular rate and rhythm; No murmurs, rubs, or gallops  ABDOMEN: Soft, Nontender, Nondistended; Bowel sounds present  EXTREMITIES:  2+ Peripheral Pulses, No clubbing, cyanosis, or edema  PSYCH: AAOx3  NEUROLOGY: non-focal  SKIN: No rashes or lesions    LABS:                        11.9   6.74  )-----------( 284      ( 19 May 2019 06:41 )             36.6     05-19    141  |  104  |  20  ----------------------------<  105<H>  3.6   |  26  |  0.64    Ca    9.3      19 May 2019 06:41  Phos  3.8     05-19  Mg     1.9     05-19    TPro  7.0  /  Alb  4.3  /  TBili  0.3  /  DBili  x   /  AST  26  /  ALT  20  /  AlkPhos  100  05-18              RADIOLOGY & ADDITIONAL TESTS:    Imaging Personally Reviewed: < from: MR Ankle w/ IV Cont, Left (05.17.19 @ 19:55) >  Impression:   5.8 x 2.2x 1.3 cm abscess anterior laterally at the level of the ankle.   Large tibiotalar joint effusion with marked synovitis and marrow signal   changes within the talus, distal tibia and distal fibula suggestive of   septic arthritis/osteomyelitis.  Area of nonenhancement within the lateral talus suspicious for ischemia   or infarction.    < end of copied text >  < from: US Duplex Venous Lower Ext Ltd, Left (05.16.19 @ 21:50) >  Impression:      No deep vein thrombosis in the left lower extremity.    < end of copied text >    < from: Xray Ankle 2 Views, Left (05.16.19 @ 21:30) >  IMPRESSION:  Soft tissue swelling of the left ankle. Small joint effusion. No   radiographic evidence of osteomyelitis. No acute fracture.    < end of copied text >      Consultant(s) Notes Reviewed:      Care Discussed with Consultants/Other Providers: ortho Patient is a 53y old  Female who presents with a chief complaint of ankle swelling (18 May 2019 23:30)      SUBJECTIVE / OVERNIGHT EVENTS:  Patient c/o left ankle pain. Patient has no new complaints. Denies cp, SOB, abdominal pain, N/V/D     MEDICATIONS  (STANDING):  amitriptyline 25 milliGRAM(s) Oral at bedtime  clindamycin IVPB 600 milliGRAM(s) IV Intermittent every 8 hours  lactobacillus acidophilus 1 Tablet(s) Oral two times a day  QUEtiapine 200 milliGRAM(s) Oral at bedtime    MEDICATIONS  (PRN):  acetaminophen   Tablet .. 650 milliGRAM(s) Oral every 6 hours PRN Mild Pain (1 - 3)  oxyCODONE    IR 5 milliGRAM(s) Oral every 4 hours PRN moderate to severe pain (4-10)      Vital Signs Last 24 Hrs  T(C): 36.3 (19 May 2019 10:52), Max: 36.9 (18 May 2019 23:35)  T(F): 97.4 (19 May 2019 10:52), Max: 98.5 (18 May 2019 23:35)  HR: 100 (19 May 2019 10:52) (80 - 104)  BP: 109/63 (19 May 2019 10:52) (95/63 - 121/73)  BP(mean): --  RR: 18 (19 May 2019 10:52) (16 - 18)  SpO2: 100% (19 May 2019 10:52) (96% - 100%)  CAPILLARY BLOOD GLUCOSE        I&O's Summary      PHYSICAL EXAM:  GENERAL: NAD, well-developed  HEAD:  Atraumatic, Normocephalic  EYES: EOMI, PERRLA, conjunctiva and sclera clear  NECK: Supple, No JVD  CHEST/LUNG: Clear to auscultation bilaterally; No wheeze  HEART: Regular rate and rhythm; No murmurs, rubs, or gallops  ABDOMEN: Soft, Nontender, Nondistended; Bowel sounds present  EXTREMITIES:  left anteriolateral ankle erthema and swelling around healed closed surgical wound. 2+ Peripheral Pulses, No clubbing, or cyanosis  PSYCH: AAOx3  NEUROLOGY: non-focal  SKIN: No rashes or lesions    LABS:                        11.9   6.74  )-----------( 284      ( 19 May 2019 06:41 )             36.6     05-19    141  |  104  |  20  ----------------------------<  105<H>  3.6   |  26  |  0.64    Ca    9.3      19 May 2019 06:41  Phos  3.8     05-19  Mg     1.9     05-19    TPro  7.0  /  Alb  4.3  /  TBili  0.3  /  DBili  x   /  AST  26  /  ALT  20  /  AlkPhos  100  05-18              RADIOLOGY & ADDITIONAL TESTS:    Imaging Personally Reviewed: < from: MR Ankle w/ IV Cont, Left (05.17.19 @ 19:55) >  Impression:   5.8 x 2.2x 1.3 cm abscess anterior laterally at the level of the ankle.   Large tibiotalar joint effusion with marked synovitis and marrow signal   changes within the talus, distal tibia and distal fibula suggestive of   septic arthritis/osteomyelitis.  Area of nonenhancement within the lateral talus suspicious for ischemia   or infarction.    < end of copied text >  < from: US Duplex Venous Lower Ext Ltd, Left (05.16.19 @ 21:50) >  Impression:      No deep vein thrombosis in the left lower extremity.    < end of copied text >    < from: Xray Ankle 2 Views, Left (05.16.19 @ 21:30) >  IMPRESSION:  Soft tissue swelling of the left ankle. Small joint effusion. No   radiographic evidence of osteomyelitis. No acute fracture.    < end of copied text >      Consultant(s) Notes Reviewed:      Care Discussed with Consultants/Other Providers: ortho

## 2019-05-19 NOTE — PROGRESS NOTE ADULT - PROBLEM SELECTOR PLAN 5
not on ASA or statin as per patient and declined to take it. not on ASA or statin as per patient and declined to take it.  She says she takes alternative japanese herbs for CVA instead

## 2019-05-19 NOTE — PROGRESS NOTE ADULT - PROBLEM SELECTOR PLAN 4
TSH elevated and free T4 low normal. TSH elevated and free T4 low normal.  Patient says she has been of synthroid 25mcg qdaily for the last few months b/c her last TFTs were normal.   Will restart synthroid 25mcg qdaily and check TFT in 4 weeks.

## 2019-05-19 NOTE — PROGRESS NOTE ADULT - PROBLEM SELECTOR PLAN 1
c/w clindamycin  abscess may need to be drained so will consult Ortho  ID following  F/U Blood Cx c/w clindamycin  abscess may need to be drained so spoke to Ortho and they will assess.   ID following  F/U Blood Cx

## 2019-05-20 ENCOUNTER — TRANSCRIPTION ENCOUNTER (OUTPATIENT)
Age: 54
End: 2019-05-20

## 2019-05-20 DIAGNOSIS — E87.0 HYPEROSMOLALITY AND HYPERNATREMIA: ICD-10-CM

## 2019-05-20 DIAGNOSIS — Z01.810 ENCOUNTER FOR PREPROCEDURAL CARDIOVASCULAR EXAMINATION: ICD-10-CM

## 2019-05-20 LAB
ANION GAP SERPL CALC-SCNC: 13 MMO/L — SIGNIFICANT CHANGE UP (ref 7–14)
APPEARANCE UR: CLEAR — SIGNIFICANT CHANGE UP
APTT BLD: 33.6 SEC — SIGNIFICANT CHANGE UP (ref 27.5–36.3)
BACTERIA # UR AUTO: NEGATIVE — SIGNIFICANT CHANGE UP
BILIRUB UR-MCNC: NEGATIVE — SIGNIFICANT CHANGE UP
BLD GP AB SCN SERPL QL: NEGATIVE — SIGNIFICANT CHANGE UP
BLOOD UR QL VISUAL: NEGATIVE — SIGNIFICANT CHANGE UP
BUN SERPL-MCNC: 19 MG/DL — SIGNIFICANT CHANGE UP (ref 7–23)
CALCIUM SERPL-MCNC: 9.6 MG/DL — SIGNIFICANT CHANGE UP (ref 8.4–10.5)
CHLORIDE SERPL-SCNC: 109 MMOL/L — HIGH (ref 98–107)
CO2 SERPL-SCNC: 27 MMOL/L — SIGNIFICANT CHANGE UP (ref 22–31)
COLOR SPEC: SIGNIFICANT CHANGE UP
CREAT SERPL-MCNC: 0.67 MG/DL — SIGNIFICANT CHANGE UP (ref 0.5–1.3)
CRP SERPL-MCNC: 8.2 MG/L — HIGH
ERYTHROCYTE [SEDIMENTATION RATE] IN BLOOD: 23 MM/HR — SIGNIFICANT CHANGE UP (ref 4–25)
FERRITIN SERPL-MCNC: 92.06 NG/ML — SIGNIFICANT CHANGE UP (ref 15–150)
GLUCOSE SERPL-MCNC: 95 MG/DL — SIGNIFICANT CHANGE UP (ref 70–99)
GLUCOSE UR-MCNC: NEGATIVE — SIGNIFICANT CHANGE UP
HCT VFR BLD CALC: 39.3 % — SIGNIFICANT CHANGE UP (ref 34.5–45)
HGB BLD-MCNC: 12.8 G/DL — SIGNIFICANT CHANGE UP (ref 11.5–15.5)
HYALINE CASTS # UR AUTO: NEGATIVE — SIGNIFICANT CHANGE UP
INR BLD: 1.05 — SIGNIFICANT CHANGE UP (ref 0.88–1.17)
KETONES UR-MCNC: NEGATIVE — SIGNIFICANT CHANGE UP
LEUKOCYTE ESTERASE UR-ACNC: SIGNIFICANT CHANGE UP
MCHC RBC-ENTMCNC: 30.3 PG — SIGNIFICANT CHANGE UP (ref 27–34)
MCHC RBC-ENTMCNC: 32.6 % — SIGNIFICANT CHANGE UP (ref 32–36)
MCV RBC AUTO: 93.1 FL — SIGNIFICANT CHANGE UP (ref 80–100)
NITRITE UR-MCNC: NEGATIVE — SIGNIFICANT CHANGE UP
NRBC # FLD: 0 K/UL — SIGNIFICANT CHANGE UP (ref 0–0)
PH UR: 6.5 — SIGNIFICANT CHANGE UP (ref 5–8)
PLATELET # BLD AUTO: 301 K/UL — SIGNIFICANT CHANGE UP (ref 150–400)
PMV BLD: 8.8 FL — SIGNIFICANT CHANGE UP (ref 7–13)
POTASSIUM SERPL-MCNC: 4.2 MMOL/L — SIGNIFICANT CHANGE UP (ref 3.5–5.3)
POTASSIUM SERPL-SCNC: 4.2 MMOL/L — SIGNIFICANT CHANGE UP (ref 3.5–5.3)
PROT UR-MCNC: NEGATIVE — SIGNIFICANT CHANGE UP
PROTHROM AB SERPL-ACNC: 12 SEC — SIGNIFICANT CHANGE UP (ref 9.8–13.1)
RBC # BLD: 4.22 M/UL — SIGNIFICANT CHANGE UP (ref 3.8–5.2)
RBC # FLD: 11.9 % — SIGNIFICANT CHANGE UP (ref 10.3–14.5)
RBC CASTS # UR COMP ASSIST: SIGNIFICANT CHANGE UP (ref 0–?)
RH IG SCN BLD-IMP: POSITIVE — SIGNIFICANT CHANGE UP
RH IG SCN BLD-IMP: POSITIVE — SIGNIFICANT CHANGE UP
SODIUM SERPL-SCNC: 149 MMOL/L — HIGH (ref 135–145)
SP GR SPEC: 1.02 — SIGNIFICANT CHANGE UP (ref 1–1.04)
SQUAMOUS # UR AUTO: SIGNIFICANT CHANGE UP
UROBILINOGEN FLD QL: NORMAL — SIGNIFICANT CHANGE UP
WBC # BLD: 7.26 K/UL — SIGNIFICANT CHANGE UP (ref 3.8–10.5)
WBC # FLD AUTO: 7.26 K/UL — SIGNIFICANT CHANGE UP (ref 3.8–10.5)
WBC UR QL: SIGNIFICANT CHANGE UP (ref 0–?)

## 2019-05-20 PROCEDURE — 99232 SBSQ HOSP IP/OBS MODERATE 35: CPT

## 2019-05-20 PROCEDURE — 71046 X-RAY EXAM CHEST 2 VIEWS: CPT | Mod: 26

## 2019-05-20 PROCEDURE — 93010 ELECTROCARDIOGRAM REPORT: CPT

## 2019-05-20 PROCEDURE — 99233 SBSQ HOSP IP/OBS HIGH 50: CPT

## 2019-05-20 RX ADMIN — Medication 25 MICROGRAM(S): at 06:14

## 2019-05-20 RX ADMIN — QUETIAPINE FUMARATE 200 MILLIGRAM(S): 200 TABLET, FILM COATED ORAL at 23:22

## 2019-05-20 RX ADMIN — Medication 100 MILLIGRAM(S): at 14:32

## 2019-05-20 RX ADMIN — Medication 1 TABLET(S): at 17:43

## 2019-05-20 RX ADMIN — Medication 100 MILLIGRAM(S): at 06:14

## 2019-05-20 RX ADMIN — Medication 100 MILLIGRAM(S): at 23:22

## 2019-05-20 RX ADMIN — Medication 1 TABLET(S): at 06:14

## 2019-05-20 RX ADMIN — Medication 25 MILLIGRAM(S): at 23:22

## 2019-05-20 NOTE — PROGRESS NOTE ADULT - SUBJECTIVE AND OBJECTIVE BOX
53 years old female with chronic swelling left ankle joint with septic joint and soft tissue abscess, several weeks ago attempt to do exploration debridement irrigation , culture grow staphylococcus aureus, patient was on antibiotics. At this stage patient having persistent swelling lateral aspect left ankle. Recent MRI reveal proliferative synovitis fluid collection with possible arrosion of the talus , The possibility of persistent infected ankle could not be R/O. At this stage condition was discussed extensively with the patient. The severity of the condition was discussed. Patient was recommended for further exploration ankle joint extensive debridement and consideration of insertion of VAC system, The nature of surgery risk of surgery the likelihood that this may not solve the problem was explain. Patient has to know from the beginning the severity of the condition, loss of ankle joint motion stiffness and progression of the underlying condition could not be R/O. Condition was explained also to her  over the phone, patient was answered all her questions. arrangement for the surgical procedure will be made.

## 2019-05-20 NOTE — CHART NOTE - NSCHARTNOTEFT_GEN_A_CORE
Search Terms: lisa Vital, 1965 Search Date: 05/20/2019 02:42:10 PM  The Drug Utilization Report below displays all of the controlled substance prescriptions, if any, that your patient has filled in the last twelve months. The information displayed on this report is compiled from pharmacy submissions to the Department, and accurately reflects the information as submitted by the pharmacies.    This report was requested by: Pamela Almendarez | Reference #: [id number]    You have not added a SHAWN number. Keeping your SHAWN number(s) up to date on the My SHAWN Numbers page will enable the separation of your prescriptions from others' in the search results.    Others' Prescriptions  Patient Name:	Lisa Vital	YOB: 1965  Address:	[address] [place] [address]	Sex:	Female  Rx Written	Rx Dispensed	Drug	Quantity	Days Supply	Prescriber Name  03/28/2019	03/30/2019	oxycodone-acetaminophen 5-325 mg tablet	20	5	[facility]

## 2019-05-20 NOTE — PROGRESS NOTE ADULT - SUBJECTIVE AND OBJECTIVE BOX
Patient is a 53y old  Female who presents with a chief complaint of ankle swelling (20 May 2019 11:09)      SUBJECTIVE / OVERNIGHT EVENTS: No acute events overnight. Reports she has chronic back pain and left ankle pain. States she has tried many different pain med which has not helped and does not want to try any other pain meds at this time and does not want any ice packs.     MEDICATIONS  (STANDING):  amitriptyline 25 milliGRAM(s) Oral at bedtime  clindamycin IVPB 600 milliGRAM(s) IV Intermittent every 8 hours  lactobacillus acidophilus 1 Tablet(s) Oral two times a day  levothyroxine 25 MICROGram(s) Oral daily  QUEtiapine 200 milliGRAM(s) Oral at bedtime    MEDICATIONS  (PRN):  acetaminophen   Tablet .. 650 milliGRAM(s) Oral every 6 hours PRN Mild Pain (1 - 3)  oxyCODONE    IR 5 milliGRAM(s) Oral every 4 hours PRN moderate to severe pain (4-10)      T(C): 36.6 (19 @ 14:30), Max: 37.1 (19 @ 21:44)  HR: 105 (19 @ 14:30) (97 - 105)  BP: 129/78 (19 @ 14:30) (103/73 - 129/78)  RR: 18 (19 @ 14:30) (16 - 18)  SpO2: 96% (19 @ 14:30) (96% - 100%)  CAPILLARY BLOOD GLUCOSE    I&O's Summary    PHYSICAL EXAM:  GENERAL: NAD, talking comfortably on phone  EYES: sclera clear  CHEST/LUNG: Clear to auscultation bilaterally; No wheeze or crackles  HEART: s1/s2  ABDOMEN: Soft, Nontender, Nondistended; Bowel sounds present  EXTREMITIES:  left anterolateral ankle erythema and swelling around healed closed surgical wound. 2+ Peripheral Pulses, No clubbing, or cyanosis  PSYCH: awake, alert, responds appropriately    LABS:                        12.8   7.26  )-----------( 301      ( 20 May 2019 11:27 )             39.3     05-20    149<H>  |  109<H>  |  19  ----------------------------<  95  4.2   |  27  |  0.67    Ca    9.6      20 May 2019 11:27  Phos  3.8     05-19  Mg     1.9     05-19    TPro  7.0  /  Alb  4.3  /  TBili  0.3  /  DBili  x   /  AST  26  /  ALT  20  /  AlkPhos  100  05-18    PT/INR - ( 20 May 2019 11:27 )   PT: 12.0 SEC;   INR: 1.05          PTT - ( 20 May 2019 11:27 )  PTT:33.6 SEC      Urinalysis Basic - ( 20 May 2019 12:50 )    Color: LIGHT YELLOW / Appearance: CLEAR / S.019 / pH: 6.5  Gluc: NEGATIVE / Ketone: NEGATIVE  / Bili: NEGATIVE / Urobili: NORMAL   Blood: NEGATIVE / Protein: NEGATIVE / Nitrite: NEGATIVE   Leuk Esterase: TRACE / RBC: 0-2 / WBC 0-2   Sq Epi: OCC / Non Sq Epi: x / Bacteria: NEGATIVE        RADIOLOGY & ADDITIONAL TESTS:

## 2019-05-20 NOTE — PROGRESS NOTE ADULT - SUBJECTIVE AND OBJECTIVE BOX
f/u left ankle swelling/infection    Patient is a 53y old  Female who presents with a chief complaint of cellulitis    Interval History/ROS:  no fever/chills.  still with swelling of the left ankle.  MRI done - shows septic ankle, OM.  tolerating clindamycin.  no diarreha.  Remainder of ROS otherwise negative.    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, the pressure is not high&quot;  Depression  History of seizure: x1-2007  H/O lymph node biopsy: &quot;mediastinal&quot;-2005  History of appendectomy: as a child  History of tonsillectomy: as a child    Allergies  penicillin (Rash)    ANTIMICROBIALS:    clindamycin IVPB 600 every 8 hours (5/16-)    MEDICATIONS  (STANDING):  amitriptyline 25 at bedtime  levothyroxine 25 daily  QUEtiapine 200 at bedtime      Vital Signs Last 24 Hrs  T(F): 97.9 (05-20-19 @ 10:31), Max: 98.8 (05-19-19 @ 21:44)  HR: 105 (05-20-19 @ 10:31)  BP: 108/67 (05-20-19 @ 10:31)  RR: 16 (05-20-19 @ 10:31)  SpO2: 100% (05-20-19 @ 10:31) (96% - 100%)    PHYSICAL EXAM:  General: non-toxic  HEAD/EYES: anicteric  ENT:  supple  Cardiovascular:   S1, S2  Respiratory:  clear bilaterally  GI:  soft, non-tender, normal bowel sounds  :  no betancur  Musculoskeletal:  swollen L ankle but good ROM; not much erythema; no open lesions; no sores  Neurologic:  grossly non-focal  Skin:  no rash  Psychiatric:  appropriate affect  Vascular:  no phlebitis    Sedimentation Rate, Erythrocyte: 20 (05-17-19)  Sedimentation Rate, Erythrocyte: 7 (05-13-19)    C-Reactive Protein, Serum: 14.4 (05-13-19)                        11.9   6.74  )-----------( 284      ( 19 May 2019 06:41 )             36.6 05-19    141  |  104  |  20  ----------------------------<  105  3.6   |  26  |  0.64  Ca    9.3      19 May 2019 06:41Phos  3.8     05-19Mg     1.9     05-19  TPro  7.0  /  Alb  4.3  /  TBili  0.3  /  DBili  x   /  AST  26  /  ALT  20  /  AlkPhos  100  05-18    Surgical Pathology Report:   Final Diagnosis  1. Soft tissue mass, left ankle, excision:   - Fibroconnective tissue with degenerated mucin-like material and focal foreign body type giant cell reaction.  2. Soft tissue mass, left ankle, excision:  - Fibroconnective tissue with foci of necrosis, degenerated mucin-like material, granulomatous inflammation, and focal foreign body type giant cell reaction.  - Negative for Fungal organisms (GMS stain).  - AFB stain to be reported as addendum.  (03.28.19 @ 20:03)    MICROBIOLOGY:  Culture - Yeast and Fungus (03.28.19 @ 20:24)  CULTURE NEGATIVE FOR YEASTS AND MOLDS   AFTER 4 WEEKS    Specimen Source: OTHER    Culture - Surg Site Aerob/Anaer w/Gm St (03.28.19 @ 20:24)    Gram Stain Wound:   NOS No Organisms Seen  WBC^White Blood Cells  QNTY CELLS IN GRAM STAIN: RARE (1+)    -  Cefazolin: S <=4 JOSE    -  Ciprofloxacin: S <=1 JOSE    -  Clindamycin: S    -  Erythromycin: S    -  Gentamicin: S <=1 JOSE    -  Levofloxacin: S <=0.5 JOSE    -  Moxifloxacin(Aerobic): S <=0.5 JOSE    -  Oxacillin: S <=0.25 JOSE    -  Rifampin: S <=1 JOSE    -  Tetra/Doxy: S <=1 JOSE    -  Trimethoprim/Sulfamethoxazole: S <=0.5/9.5 JOSE    -  Vancomycin: S 1 JOSE    Culture - Surgical Site:   RARE    Specimen Source: OTHER    Organism Identification: Staphylococcus aureus    RADIOLOGY:  imaging below personally reviewed    MR Ankle w/ IV Cont, Left (05.17.19 @ 19:55) >  Impression: 5.8 x 2.2x 1.3 cm abscess anterior laterally at the level of the ankle. Large tibiotalar joint effusion with marked synovitis and marrow signal changes within the talus, distal tibia and distal fibula suggestive of septic arthritis/osteomyelitis. Area of nonenhancement within the lateral talus suspicious for ischemia or infarction.    Xray Ankle 2 Views, Left (05.16.19 @ 21:30) >  IMPRESSION:  Soft tissue swelling of the left ankle. Small joint effusion. No radiographic evidence of osteomyelitis. No acute fracture.

## 2019-05-20 NOTE — PROGRESS NOTE ADULT - PROBLEM SELECTOR PLAN 1
OR 5/21/2019 for L ankle abscess  No active chest pain or respiratory issues. Lungs clear  EKG personally reviewed and is without ischemic changes, CXR pending  No further cardiac workup required prior to OR OR 5/21/2019 for L ankle abscess  No active chest pain or respiratory issues. Lungs clear  EKG personally reviewed and is without ischemic changes, CXR personally reviewed and is clear without consolidations or effusions.  RCRI score of 0. Patient is low risk for proposed procedure. No further cardiac workup required prior to OR

## 2019-05-20 NOTE — PROGRESS NOTE ADULT - SUBJECTIVE AND OBJECTIVE BOX
Ortho PreOp Progress Note  AILYN MARIA   MRN-[id number]    53yFemale a/w L ankle cellulitis, going to the OR 5/21/2019 for L ankle abscess possible infected ankle joint, surgery with Dr. Cain.      Vital Signs Last 24 Hrs  T(C): 36.6 (20 May 2019 10:31), Max: 37.1 (19 May 2019 21:44)  T(F): 97.9 (20 May 2019 10:31), Max: 98.8 (19 May 2019 21:44)  HR: 105 (20 May 2019 10:31) (97 - 105)  BP: 108/67 (20 May 2019 10:31) (95/65 - 125/79)  BP(mean): --  RR: 16 (20 May 2019 10:31) (16 - 18)  SpO2: 100% (20 May 2019 10:31) (96% - 100%)  penicillin (Rash)                            11.9   6.74  )-----------( 284      ( 19 May 2019 06:41 )             36.6     05-19    141  |  104  |  20  ----------------------------<  105<H>  3.6   |  26  |  0.64    Ca    9.3      19 May 2019 06:41  Phos  3.8     05-19  Mg     1.9     05-19    TPro  7.0  /  Alb  4.3  /  TBili  0.3  /  DBili  x   /  AST  26  /  ALT  20  /  AlkPhos  100  05-18    T&C ordered    BHcg neg 4/21    UA ordered    CXR ordered  EKG    Doppler LLE neg - see [facility]    Consent to be obtained  Medical Clearance - await clearance        A/P Ortho Stable for OR 5/21/2019  ck labs now, CXR, UA  no anticoagulation p MN  NPO p MN Ortho PreOp Progress Note  AILYN MARIA   MRN-[id number]    53yFemale a/w L ankle cellulitis, going to the OR 5/21/2019 for L ankle abscess possible infected ankle joint, surgery with Dr. Cain.      Vital Signs Last 24 Hrs  T(C): 36.6 (20 May 2019 10:31), Max: 37.1 (19 May 2019 21:44)  T(F): 97.9 (20 May 2019 10:31), Max: 98.8 (19 May 2019 21:44)  HR: 105 (20 May 2019 10:31) (97 - 105)  BP: 108/67 (20 May 2019 10:31) (95/65 - 125/79)  BP(mean): --  RR: 16 (20 May 2019 10:31) (16 - 18)  SpO2: 100% (20 May 2019 10:31) (96% - 100%)  penicillin (Rash)                            11.9   6.74  )-----------( 284      ( 19 May 2019 06:41 )             36.6     05-19    141  |  104  |  20  ----------------------------<  105<H>  3.6   |  26  |  0.64    Ca    9.3      19 May 2019 06:41  Phos  3.8     05-19  Mg     1.9     05-19    TPro  7.0  /  Alb  4.3  /  TBili  0.3  /  DBili  x   /  AST  26  /  ALT  20  /  AlkPhos  100  05-18    T&C ordered    BHcg neg 4/21    UA ordered    CXR ordered  EKG needs to be ordered (informed ADS)    Doppler LLE neg - see [facility]    Consent to be obtained  Medical Clearance - await clearance        A/P Ortho Stable for OR 5/21/2019  ck labs now, CXR, UA  no anticoagulation p MN  NPO p MN Ortho PreOp Progress Note  AILYN MARIA   MRN-[id number]    53yFemale a/w L ankle cellulitis, going to the OR 5/21/2019 for L ankle abscess possible infected ankle joint, surgery with Dr. Cain.      Vital Signs Last 24 Hrs  T(C): 36.6 (20 May 2019 10:31), Max: 37.1 (19 May 2019 21:44)  T(F): 97.9 (20 May 2019 10:31), Max: 98.8 (19 May 2019 21:44)  HR: 105 (20 May 2019 10:31) (97 - 105)  BP: 108/67 (20 May 2019 10:31) (95/65 - 125/79)  BP(mean): --  RR: 16 (20 May 2019 10:31) (16 - 18)  SpO2: 100% (20 May 2019 10:31) (96% - 100%)  penicillin (Rash)                            11.9   6.74  )-----------( 284      ( 19 May 2019 06:41 )             36.6     05-19    141  |  104  |  20  ----------------------------<  105<H>  3.6   |  26  |  0.64    Ca    9.3      19 May 2019 06:41  Phos  3.8     05-19  Mg     1.9     05-19    TPro  7.0  /  Alb  4.3  /  TBili  0.3  /  DBili  x   /  AST  26  /  ALT  20  /  AlkPhos  100  05-18    T&C ordered    BHcg needs to be ordered (informed ADS)    UA ordered    CXR ordered  EKG needs to be ordered (informed ADS)    Doppler LLE neg - see [facility]    Consent to be obtained  Medical Clearance - await clearance        A/P Ortho Stable for OR 5/21/2019  ck labs now, CXR, UA  no anticoagulation p MN  NPO p MN

## 2019-05-20 NOTE — PROVIDER CONTACT NOTE (OTHER) - SITUATION
Clarification of NPO after midnight order. Patient going to OR tomorrow. Patient has morning medications. Should I hold or administer?

## 2019-05-20 NOTE — PROGRESS NOTE ADULT - PROBLEM SELECTOR PLAN 5
TSH elevated and free T4 low normal.  Patient says she has been off synthroid 25mcg qdaily for the last few months b/c her last TFTs were normal. synthroid 25mcg qdaily was restarted and check TFT in 4 weeks.

## 2019-05-21 ENCOUNTER — RESULT REVIEW (OUTPATIENT)
Age: 54
End: 2019-05-21

## 2019-05-21 ENCOUNTER — TRANSCRIPTION ENCOUNTER (OUTPATIENT)
Age: 54
End: 2019-05-21

## 2019-05-21 ENCOUNTER — APPOINTMENT (OUTPATIENT)
Dept: ORTHOPEDIC SURGERY | Facility: CLINIC | Age: 54
End: 2019-05-21

## 2019-05-21 ENCOUNTER — APPOINTMENT (OUTPATIENT)
Dept: ORTHOPEDIC SURGERY | Facility: HOSPITAL | Age: 54
End: 2019-05-21

## 2019-05-21 LAB
ANION GAP SERPL CALC-SCNC: 11 MMO/L — SIGNIFICANT CHANGE UP (ref 7–14)
APTT BLD: 31 SEC — SIGNIFICANT CHANGE UP (ref 27.5–36.3)
BACTERIA BLD CULT: SIGNIFICANT CHANGE UP
BACTERIA BLD CULT: SIGNIFICANT CHANGE UP
BUN SERPL-MCNC: 21 MG/DL — SIGNIFICANT CHANGE UP (ref 7–23)
CALCIUM SERPL-MCNC: 9.5 MG/DL — SIGNIFICANT CHANGE UP (ref 8.4–10.5)
CHLORIDE SERPL-SCNC: 107 MMOL/L — SIGNIFICANT CHANGE UP (ref 98–107)
CO2 SERPL-SCNC: 25 MMOL/L — SIGNIFICANT CHANGE UP (ref 22–31)
CREAT SERPL-MCNC: 0.72 MG/DL — SIGNIFICANT CHANGE UP (ref 0.5–1.3)
GLUCOSE SERPL-MCNC: 86 MG/DL — SIGNIFICANT CHANGE UP (ref 70–99)
HCG SERPL-ACNC: < 5 MIU/ML — SIGNIFICANT CHANGE UP
HCT VFR BLD CALC: 35.4 % — SIGNIFICANT CHANGE UP (ref 34.5–45)
HGB BLD-MCNC: 11.8 G/DL — SIGNIFICANT CHANGE UP (ref 11.5–15.5)
INR BLD: 1.05 — SIGNIFICANT CHANGE UP (ref 0.88–1.17)
MCHC RBC-ENTMCNC: 30.5 PG — SIGNIFICANT CHANGE UP (ref 27–34)
MCHC RBC-ENTMCNC: 33.3 % — SIGNIFICANT CHANGE UP (ref 32–36)
MCV RBC AUTO: 91.5 FL — SIGNIFICANT CHANGE UP (ref 80–100)
NRBC # FLD: 0 K/UL — SIGNIFICANT CHANGE UP (ref 0–0)
PLATELET # BLD AUTO: 278 K/UL — SIGNIFICANT CHANGE UP (ref 150–400)
PMV BLD: 8.9 FL — SIGNIFICANT CHANGE UP (ref 7–13)
POTASSIUM SERPL-MCNC: 4 MMOL/L — SIGNIFICANT CHANGE UP (ref 3.5–5.3)
POTASSIUM SERPL-SCNC: 4 MMOL/L — SIGNIFICANT CHANGE UP (ref 3.5–5.3)
PROTHROM AB SERPL-ACNC: 12 SEC — SIGNIFICANT CHANGE UP (ref 9.8–13.1)
RBC # BLD: 3.87 M/UL — SIGNIFICANT CHANGE UP (ref 3.8–5.2)
RBC # FLD: 11.9 % — SIGNIFICANT CHANGE UP (ref 10.3–14.5)
SODIUM SERPL-SCNC: 143 MMOL/L — SIGNIFICANT CHANGE UP (ref 135–145)
SPECIMEN SOURCE: SIGNIFICANT CHANGE UP
WBC # BLD: 7.82 K/UL — SIGNIFICANT CHANGE UP (ref 3.8–10.5)
WBC # FLD AUTO: 7.82 K/UL — SIGNIFICANT CHANGE UP (ref 3.8–10.5)

## 2019-05-21 PROCEDURE — 11981 INSERTION DRUG DLVR IMPLANT: CPT | Mod: 78,LT

## 2019-05-21 PROCEDURE — 99232 SBSQ HOSP IP/OBS MODERATE 35: CPT

## 2019-05-21 PROCEDURE — 27612 EXPLORATION OF ANKLE JOINT: CPT | Mod: 78,LT

## 2019-05-21 PROCEDURE — 27603 DRAIN LOWER LEG LESION: CPT | Mod: 78,LT

## 2019-05-21 PROCEDURE — 88305 TISSUE EXAM BY PATHOLOGIST: CPT | Mod: 26

## 2019-05-21 RX ORDER — HYDROMORPHONE HYDROCHLORIDE 2 MG/ML
0.5 INJECTION INTRAMUSCULAR; INTRAVENOUS; SUBCUTANEOUS
Refills: 0 | Status: DISCONTINUED | OUTPATIENT
Start: 2019-05-21 | End: 2019-05-22

## 2019-05-21 RX ORDER — SODIUM CHLORIDE 9 MG/ML
1000 INJECTION INTRAMUSCULAR; INTRAVENOUS; SUBCUTANEOUS
Refills: 0 | Status: DISCONTINUED | OUTPATIENT
Start: 2019-05-21 | End: 2019-05-23

## 2019-05-21 RX ORDER — SODIUM CHLORIDE 9 MG/ML
500 INJECTION, SOLUTION INTRAVENOUS
Refills: 0 | Status: DISCONTINUED | OUTPATIENT
Start: 2019-05-21 | End: 2019-05-23

## 2019-05-21 RX ORDER — ONDANSETRON 8 MG/1
4 TABLET, FILM COATED ORAL ONCE
Refills: 0 | Status: DISCONTINUED | OUTPATIENT
Start: 2019-05-21 | End: 2019-05-22

## 2019-05-21 RX ADMIN — SODIUM CHLORIDE 999 MILLILITER(S): 9 INJECTION, SOLUTION INTRAVENOUS at 02:16

## 2019-05-21 RX ADMIN — HYDROMORPHONE HYDROCHLORIDE 0.5 MILLIGRAM(S): 2 INJECTION INTRAMUSCULAR; INTRAVENOUS; SUBCUTANEOUS at 23:20

## 2019-05-21 RX ADMIN — HYDROMORPHONE HYDROCHLORIDE 0.5 MILLIGRAM(S): 2 INJECTION INTRAMUSCULAR; INTRAVENOUS; SUBCUTANEOUS at 23:46

## 2019-05-21 RX ADMIN — SODIUM CHLORIDE 50 MILLILITER(S): 9 INJECTION INTRAMUSCULAR; INTRAVENOUS; SUBCUTANEOUS at 23:26

## 2019-05-21 RX ADMIN — Medication 100 MILLIGRAM(S): at 14:48

## 2019-05-21 RX ADMIN — HYDROMORPHONE HYDROCHLORIDE 0.5 MILLIGRAM(S): 2 INJECTION INTRAMUSCULAR; INTRAVENOUS; SUBCUTANEOUS at 23:35

## 2019-05-21 RX ADMIN — Medication 25 MICROGRAM(S): at 06:01

## 2019-05-21 RX ADMIN — Medication 100 MILLIGRAM(S): at 06:01

## 2019-05-21 RX ADMIN — HYDROMORPHONE HYDROCHLORIDE 0.5 MILLIGRAM(S): 2 INJECTION INTRAMUSCULAR; INTRAVENOUS; SUBCUTANEOUS at 23:36

## 2019-05-21 NOTE — DISCHARGE NOTE PROVIDER - NSDCHHNEEDSERVICE_GEN_ALL_CORE
Rehabilitation services Rehabilitation services/Medication teaching and assessment/Wound care and assessment

## 2019-05-21 NOTE — DISCHARGE NOTE PROVIDER - HOSPITAL COURSE
Mrs. David is a 52 YO F with PMHx of CDIFF, CVA, Seizures, NHL, Acquired Hypothyroidism, Depression, s/p recent L ankle cyst and abscess removal in March 2019 and completed outpatient course of Doxycycline no wpresented with recurrent infection s/p failure of outpatient Keflex. Patient admitted for Septic Arthritis.     DVT study of left leg negative.    MRI ankle suggestive of septic arthritis. Culture of wound with staph.    Blood cultures negative.    ID and ortho following.    Pt placed on clindamycin.    Taken to OR for washout and debridement on 5/21-----------    PT recs-------------            dispo: Mrs. David is a 54 YO F with PMHx of CDIFF, CVA, Seizures, NHL, Acquired Hypothyroidism, Depression, s/p recent L ankle cyst and abscess removal in March 2019 and completed outpatient course of Doxycycline no wpresented with recurrent infection s/p failure of outpatient Keflex. Patient admitted for Septic Arthritis.     DVT study of left leg negative.    MRI ankle suggestive of septic arthritis. Culture of wound with staph.    Blood cultures negative.    ID and ortho following.    Pt placed on clindamycin, transitioned to anceph per ID team for _____ days     Taken to OR for washout and debridement on 5/21    PT recs home with home PT             dispo: Mrs. David is a 54 YO F with PMHx of CDIFF, CVA, Seizures, NHL, Acquired Hypothyroidism, Depression, s/p recent L ankle cyst and abscess removal in March 2019 and completed outpatient course of Doxycycline no wpresented with recurrent infection s/p failure of outpatient Keflex. Patient admitted for Septic Arthritis.     DVT study of left leg negative.    MRI ankle suggestive of septic arthritis. Culture of wound with staph.    Blood cultures negative.    ID and ortho following.    Pt placed on clindamycin, transitioned to anceph per ID team until 6/12/19     Taken to OR for washout and debridement on 5/21    PT recs home with home PT             dispo: Home pt Mrs. David is a 54 YO F with PMHx of CDIFF, CVA, Seizures, NHL, Acquired Hypothyroidism, Depression, s/p recent L ankle cyst and abscess removal in March 2019 and completed outpatient course of Doxycycline no wpresented with recurrent infection s/p failure of outpatient Keflex. Patient admitted for Septic Arthritis.     DVT study of left leg negative.    MRI ankle suggestive of septic arthritis. Culture of wound with staph.    Blood cultures negative.    ID and ortho following.    Pt placed on clindamycin, transitioned to ancef per ID team until 6/12/19     Taken to OR for washout and debridement on 5/21    PT recs home with home PT             dispo: Home w/home PT and IV Abx until 6/12/2019 Mrs. David is a 54 YO F with PMHx of CDIFF, CVA, Seizures, NHL, Acquired Hypothyroidism, Depression, s/p recent L ankle cyst and abscess removal in March 2019 and completed outpatient course of Doxycycline no wpresented with recurrent infection s/p failure of outpatient Keflex. Patient admitted for Septic Arthritis.     DVT study of left leg negative.    MRI left ankle with 5.8 x2.2 x1.3cm abscess s/p left ankle exploration, arthrotomy, I&D and vac placement on 5/21 by ortho    OR cultures so far negative, no bone involvement, Bcx, fungal cx NGTD.     ID and ortho following.    per ID, septic arthritis and OM - likely chronic     midline placed 5/24    Pt placed on clindamycin, transitioned to ancef per ID team until 6/12/19     VAC removed by ortho today 5/30    PT recs home with home PT             dispo: Home w/home PT and IV Abx until 6/12/2019 Mrs. David is a 52 YO F with PMHx of CDIFF, CVA, Seizures, NHL, Acquired Hypothyroidism, Depression, s/p recent L ankle cyst and abscess removal in March 2019 and completed outpatient course of Doxycycline no wpresented with recurrent infection s/p failure of outpatient Keflex. Patient admitted for Septic Arthritis.     DVT study of left leg negative.    MRI left ankle with 5.8 x2.2 x1.3cm abscess s/p left ankle exploration, arthrotomy, I&D and vac placement on 5/21 by ortho    OR cultures so far negative, no bone involvement, Bcx, fungal cx NGTD.     ID and ortho following.    per ID, septic arthritis and OM - likely chronic     midline placed 5/24    Pt placed on clindamycin, transitioned to ancef per ID team until 6/12/19     VAC removed by ortho today 5/30    Daily wet to dry dressing.     PT recs home with home PT             dispo: Home w/home PT and IV Abx until 6/12/2019

## 2019-05-21 NOTE — BRIEF OPERATIVE NOTE - NSICDXBRIEFPROCEDURE_GEN_ALL_CORE_FT
PROCEDURES:  Debridement of wound using concurrent irrigation and suction device 21-May-2019 22:57:07  Ruddy Vela

## 2019-05-21 NOTE — DISCHARGE NOTE PROVIDER - CARE PROVIDERS DIRECT ADDRESSES
,[email or web address] ,[email or web address],DirectAddress_Unknown ,[email or web address],DirectAddress_Unknown,[email or web address]

## 2019-05-21 NOTE — DISCHARGE NOTE PROVIDER - NSDCCPCAREPLAN_GEN_ALL_CORE_FT
PRINCIPAL DISCHARGE DIAGNOSIS  Diagnosis: Septic arthritis, due to unspecified organism, septic arthritis of unspecified location  Assessment and Plan of Treatment:       SECONDARY DISCHARGE DIAGNOSES  Diagnosis: Acquired hypothyroidism  Assessment and Plan of Treatment: Continue synthroid. PRINCIPAL DISCHARGE DIAGNOSIS  Diagnosis: Septic arthritis, due to unspecified organism, septic arthritis of unspecified location  Assessment and Plan of Treatment: Please continue with antibiotics as prescribed until 6/12/19. Weekly - cbc, cmp, esr, crp - fax results to Dr. Herman at [phone].        SECONDARY DISCHARGE DIAGNOSES  Diagnosis: Acquired hypothyroidism  Assessment and Plan of Treatment: Patient says she has been off synthroid  25mcg daily for the last few months b/c her last TFTs were normal. synthroid 25mcg qdaily was restarted. Please check TFT in 4 weeks with your PCP*****    Diagnosis: Depression, unspecified depression type  Assessment and Plan of Treatment: Continue home medications.    Diagnosis: History of seizure  Assessment and Plan of Treatment: Off seizure medications for 2 years.    Diagnosis: Cerebrovascular accident (CVA), unspecified mechanism  Assessment and Plan of Treatment: Please follow up with your PCP outpatinet for management of CVA.    Diagnosis: Hodgkin lymphoma, unspecified Hodgkin lymphoma type, unspecified body region  Assessment and Plan of Treatment: Stable. Follow up with your oncologist outpatient. PRINCIPAL DISCHARGE DIAGNOSIS  Diagnosis: Septic arthritis, due to unspecified organism, septic arthritis of unspecified location  Assessment and Plan of Treatment: Please continue with antibiotics as prescribed until 6/12/19. Weekly - cbc, cmp, esr, crp - fax results to Dr. Herman at [phone].        SECONDARY DISCHARGE DIAGNOSES  Diagnosis: Osteomyelitis, unspecified site, unspecified type  Assessment and Plan of Treatment: Wound vac placed in 5/30. FOllow up with Dr. Cain on friday 5/31. Call for an appointment. Contact info below    Diagnosis: Depression, unspecified depression type  Assessment and Plan of Treatment: Continue home medications.    Diagnosis: History of seizure  Assessment and Plan of Treatment: Off seizure medications for 2 years.    Diagnosis: Cerebrovascular accident (CVA), unspecified mechanism  Assessment and Plan of Treatment: Please follow up with your PCP outpatinet for management of CVA.    Diagnosis: Hodgkin lymphoma, unspecified Hodgkin lymphoma type, unspecified body region  Assessment and Plan of Treatment: Stable. Follow up with your oncologist outpatient.    Diagnosis: Acquired hypothyroidism  Assessment and Plan of Treatment: Patient says she has been off synthroid  25mcg daily for the last few months b/c her last TFTs were normal. synthroid 25mcg qdaily was restarted. Please check TFT in 4 weeks with your PCP***** PRINCIPAL DISCHARGE DIAGNOSIS  Diagnosis: Septic arthritis, due to unspecified organism, septic arthritis of unspecified location  Assessment and Plan of Treatment: Please continue with antibiotics as prescribed until 6/12/19. Weekly - cbc, cmp, esr, crp - fax results to Dr. Herman at [phone].  **You are scheduled to see Dr Herman on 6/13/2019 at 10:15 AM   Infectious Disease; Internal Medicine  [address] ([address] call to confirm your appt ***  [place] [address]  Phone: [phone]        SECONDARY DISCHARGE DIAGNOSES  Diagnosis: Osteomyelitis, unspecified site, unspecified type  Assessment and Plan of Treatment: Wound vac placed in 5/30. FOllow up with Dr. Cain on friday 5/31. Call for an appointment. Contact info below    Diagnosis: Depression, unspecified depression type  Assessment and Plan of Treatment: Continue home medications.    Diagnosis: History of seizure  Assessment and Plan of Treatment: Off seizure medications for 2 years.    Diagnosis: Cerebrovascular accident (CVA), unspecified mechanism  Assessment and Plan of Treatment: Please follow up with your PCP outpatinet for management of CVA.    Diagnosis: Hodgkin lymphoma, unspecified Hodgkin lymphoma type, unspecified body region  Assessment and Plan of Treatment: Stable. Follow up with your oncologist outpatient.    Diagnosis: Acquired hypothyroidism  Assessment and Plan of Treatment: Patient says she has been off synthroid  25mcg daily for the last few months b/c her last TFTs were normal. synthroid 25mcg qdaily was restarted. Please check TFT in 4 weeks with your PCP***** PRINCIPAL DISCHARGE DIAGNOSIS  Diagnosis: Septic arthritis, due to unspecified organism, septic arthritis of unspecified location  Assessment and Plan of Treatment: Please continue with antibiotics as prescribed until 6/12/19. Weekly - cbc, cmp, esr, crp - fax results to Dr. Herman at [phone].  Daily wet to dry dressing.   **You are scheduled to see Dr Herman on 6/13/2019 at 10:15 AM   Infectious Disease; Internal Medicine  [address] ([address] call to confirm your appt ***  [place] [address]  Phone: [phone]      SECONDARY DISCHARGE DIAGNOSES  Diagnosis: Osteomyelitis, unspecified site, unspecified type  Assessment and Plan of Treatment: Follow up with Dr Cain, please call to make an appointment.    Diagnosis: Depression, unspecified depression type  Assessment and Plan of Treatment: Continue home medications.    Diagnosis: History of seizure  Assessment and Plan of Treatment: Off seizure medications for 2 years.    Diagnosis: Cerebrovascular accident (CVA), unspecified mechanism  Assessment and Plan of Treatment: Please follow up with your primary care physicain as outpatient, call to make an appointment.    Diagnosis: Hodgkin lymphoma, unspecified Hodgkin lymphoma type, unspecified body region  Assessment and Plan of Treatment: Stable. Follow up with your oncologist as outpatient.    Diagnosis: Acquired hypothyroidism  Assessment and Plan of Treatment: Patient says she has been off synthroid  25mcg daily for the last few months because her last thyroid function were normal. Synthroid 25mcg daily was restarted. Please check thyroid function test in 4 weeks with your primary care physician. PRINCIPAL DISCHARGE DIAGNOSIS  Diagnosis: Septic arthritis, due to unspecified organism, septic arthritis of unspecified location  Assessment and Plan of Treatment: Please continue with antibiotics as prescribed until 6/12/19. Weekly - cbc, cmp, esr, crp - fax results to Dr. Herman at [phone].  Daily wet to dry dressing.   **You are scheduled to see Dr Herman on 6/13/2019 at 10:15 AM   Infectious Disease; Internal Medicine  [address] ([address]). call to confirm your appt ***  [place] [address]  Phone: [phone]      SECONDARY DISCHARGE DIAGNOSES  Diagnosis: Osteomyelitis, unspecified site, unspecified type  Assessment and Plan of Treatment: Follow up with Dr Cain, please call to make an appointment.    Diagnosis: Depression, unspecified depression type  Assessment and Plan of Treatment: Continue home medications.    Diagnosis: History of seizure  Assessment and Plan of Treatment: Off seizure medications for 2 years.    Diagnosis: Cerebrovascular accident (CVA), unspecified mechanism  Assessment and Plan of Treatment: Please follow up with your primary care physicain as outpatient, call to make an appointment.    Diagnosis: Hodgkin lymphoma, unspecified Hodgkin lymphoma type, unspecified body region  Assessment and Plan of Treatment: Stable. Follow up with your oncologist as outpatient.    Diagnosis: Acquired hypothyroidism  Assessment and Plan of Treatment: Patient says she has been off synthroid  25mcg daily for the last few months because her last thyroid function were normal. Synthroid 25mcg daily was restarted. Please check thyroid function test in 4 weeks with your primary care physician. PRINCIPAL DISCHARGE DIAGNOSIS  Diagnosis: Septic arthritis, due to unspecified organism, septic arthritis of unspecified location  Assessment and Plan of Treatment: Please continue with antibiotics as prescribed until 6/12/19. Weekly - cbc, cmp, esr, crp - fax results to Dr. Herman at [phone].  Daily wet to dry dressing.   Change midline dressing every 7 days.   **You are scheduled to see Dr Herman on 6/13/2019 at 10:15 AM   Infectious Disease; Internal Medicine  [address] ([address]). call to confirm your appt ***  [place] [address]  Phone: [phone]      SECONDARY DISCHARGE DIAGNOSES  Diagnosis: Osteomyelitis, unspecified site, unspecified type  Assessment and Plan of Treatment: Follow up with Dr Cain, please call to make an appointment.    Diagnosis: Depression, unspecified depression type  Assessment and Plan of Treatment: Continue home medications.    Diagnosis: History of seizure  Assessment and Plan of Treatment: Off seizure medications for 2 years.    Diagnosis: Cerebrovascular accident (CVA), unspecified mechanism  Assessment and Plan of Treatment: Please follow up with your primary care physicain as outpatient, call to make an appointment.    Diagnosis: Hodgkin lymphoma, unspecified Hodgkin lymphoma type, unspecified body region  Assessment and Plan of Treatment: Stable. Follow up with your oncologist as outpatient.    Diagnosis: Acquired hypothyroidism  Assessment and Plan of Treatment: Patient says she has been off synthroid  25mcg daily for the last few months because her last thyroid function were normal. Synthroid 25mcg daily was restarted. Please check thyroid function test in 4 weeks with your primary care physician.

## 2019-05-21 NOTE — PROVIDER CONTACT NOTE (OTHER) - SITUATION
Notifying about patient's BP from earlier. BP 99/57. Patient asymptomatic. Episode of low BP at night, team was notified, IV fluid bolus was given.

## 2019-05-21 NOTE — DISCHARGE NOTE PROVIDER - NSDCFUADDAPPT_GEN_ALL_CORE_FT
**You are scheduled to see Dr Herman on 6/13/2019 at 10:15 AM (enterance 5). call to confirm your appt   Infectious Disease; Internal Medicine  [address] [place] [address]  Phone: [phone]

## 2019-05-21 NOTE — DISCHARGE NOTE PROVIDER - NSDCCAREPROVSEEN_GEN_ALL_CORE_FT
Pamela Almendarez  [facility] Medicine, [facility], KARTHIK Estrada, [facility]

## 2019-05-21 NOTE — PROGRESS NOTE ADULT - SUBJECTIVE AND OBJECTIVE BOX
A/Az  Progress Note    Subjective:  NO acute events overnight, patient reports ankle swelling has worsened, NPO for OR today, Stable.     Vital Signs Last 24 Hrs  T(C): 36.6 (21 May 2019 05:54), Max: 36.8 (20 May 2019 17:54)  T(F): 97.8 (21 May 2019 05:54), Max: 98.2 (20 May 2019 17:54)  HR: 99 (21 May 2019 05:54) (95 - 105)  BP: 99/57 (21 May 2019 05:54) (79/50 - 129/78)  BP(mean): --  RR: 15 (21 May 2019 05:54) (15 - 18)  SpO2: 97% (21 May 2019 05:54) (95% - 100%)                             12.8   7.26  )-----------( 301      ( 20 May 2019 11:27 )             39.3   05-20    149<H>  |  109<H>  |  19  ----------------------------<  95  4.2   |  27  |  0.67    Ca    9.6      20 May 2019 11:27  Phos  3.8     05-19  Mg     1.9     05-19    A/P: 53yoF with left ankle abscess possible septic ankle     Neuro: Pain control  Resp: IS  GI: NPO/IVF for OR today   MSK: WBAT, PT/OT  Heme: DVT PPX: hold chemical until after OR  Patient requesting her lactobacillus

## 2019-05-21 NOTE — PROGRESS NOTE ADULT - SUBJECTIVE AND OBJECTIVE BOX
f/u left ankle swelling/infection    Patient is a 53y old  Female who presents with a chief complaint of cellulitis    Interval History/ROS:  still with much pain  - awaiting OR today.  no fever.  tolerating antibiotics.  Remainder of ROS otherwise negative.    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, the pressure is not high&quot;  Depression  History of seizure: x1-2007  H/O lymph node biopsy: &quot;mediastinal&quot;-2005  History of appendectomy: as a child  History of tonsillectomy: as a child    Allergies  penicillin (Rash)    ANTIMICROBIALS:    clindamycin IVPB 600 every 8 hours (5/16-)    MEDICATIONS  (STANDING):  amitriptyline 25 at bedtime  levothyroxine 25 daily  QUEtiapine 200 at bedtime    Vital Signs Last 24 Hrs  T(F): 98 (05-21-19 @ 10:33), Max: 98.2 (05-20-19 @ 17:54)  HR: 89 (05-21-19 @ 10:33)  BP: 96/59 (05-21-19 @ 10:33)  RR: 18 (05-21-19 @ 10:33)  SpO2: 96% (05-21-19 @ 10:33) (95% - 97%)    PHYSICAL EXAM:  General: non-toxic, generally upset  HEAD/EYES: anicteric  ENT:  supple  Cardiovascular:   S1, S2  Respiratory:  clear bilaterally  GI:  soft, non-tender, normal bowel sounds  :  no betancur  Musculoskeletal:  swollen L ankle but good ROM; not much erythema; no open lesions; no sores  Neurologic:  grossly non-focal  Skin:  no rash  Psychiatric:  upset  Vascular:  no phlebitis    Sedimentation Rate, Erythrocyte: 20 (05-17-19)  Sedimentation Rate, Erythrocyte: 7 (05-13-19)    C-Reactive Protein, Serum: 14.4 (05-13-19)                         11.8   7.82  )-----------( 278      ( 21 May 2019 06:00 )             35.4 05-21    143  |  107  |  21  ----------------------------<  86  4.0   |  25  |  0.72  Ca    9.5      21 May 2019 06:00    Surgical Pathology Report:   Final Diagnosis  1. Soft tissue mass, left ankle, excision:   - Fibroconnective tissue with degenerated mucin-like material and focal foreign body type giant cell reaction.  2. Soft tissue mass, left ankle, excision:  - Fibroconnective tissue with foci of necrosis, degenerated mucin-like material, granulomatous inflammation, and focal foreign body type giant cell reaction.  - Negative for Fungal organisms (GMS stain).  - AFB stain to be reported as addendum.  (03.28.19 @ 20:03)    MICROBIOLOGY:  Culture - Yeast and Fungus (03.28.19 @ 20:24)  CULTURE NEGATIVE FOR YEASTS AND MOLDS   AFTER 4 WEEKS    Specimen Source: OTHER    Culture - Surg Site Aerob/Anaer w/Gm St (03.28.19 @ 20:24)    Gram Stain Wound:   NOS No Organisms Seen  WBC^White Blood Cells  QNTY CELLS IN GRAM STAIN: RARE (1+)    -  Cefazolin: S <=4 JOSE    -  Ciprofloxacin: S <=1 JOSE    -  Clindamycin: S    -  Erythromycin: S    -  Gentamicin: S <=1 JOSE    -  Levofloxacin: S <=0.5 JOSE    -  Moxifloxacin(Aerobic): S <=0.5 JOSE    -  Oxacillin: S <=0.25 JOSE    -  Rifampin: S <=1 JOSE    -  Tetra/Doxy: S <=1 JOSE    -  Trimethoprim/Sulfamethoxazole: S <=0.5/9.5 JOSE    -  Vancomycin: S 1 JOSE    Culture - Surgical Site:   RARE    Specimen Source: OTHER    Organism Identification: Staphylococcus aureus    RADIOLOGY:  imaging below personally reviewed    Xray Ankle 2 Views, Left (05.16.19 @ 21:30) >  IMPRESSION:  Soft tissue swelling of the left ankle. Small joint effusion. No radiographic evidence of osteomyelitis. No acute fracture.    MR Ankle w/ IV Cont, Left (05.17.19 @ 19:55) >  Impression: 5.8 x 2.2x 1.3 cm abscess anterior laterally at the level of the ankle. Large tibiotalar joint effusion with marked synovitis and marrow signal changes within the talus, distal tibia and distal fibula suggestive of septic arthritis/osteomyelitis. Area of nonenhancement within the lateral talus suspicious for ischemia or infarction.    1/27/19 - MRI left ankle - non-specific diffuse masslike synovial thickening of the tibiotalar joint presumably herniating through the syndesmosis and extending towards the anterolateral aspect of the ankle, corresponding to the area of palpable abnormality.  Findings are concerning for a synovial process.  Possibilities include PVNS (pigmented villonodular synovitis), inflammatory arthropathy, gout, and other etiologies.    11/14/17 - MRI left ankle - finding of synovitis with fluid and synovial thickening at the tibiotalar joint. subchondral erosions are seen at the tip of the fibula and at the base of the second metatarsal.  The findings are overall non-specific although could be seen with rheumatoid arthritis.

## 2019-05-21 NOTE — DISCHARGE NOTE PROVIDER - CARE PROVIDER_API CALL
Matt Cain)  Orthopedics  [address], [address]  [place] [address]  Phone: [phone]  Fax: [phone]  Follow Up Time: Matt Cain)  Orthopedics  [address], [address]  [place] [address]  Phone: [phone]  Fax: [phone]  Follow Up Time:     Dr. Jaime,   Phone: [phone]  Fax: (   )    -  Follow Up Time: Matt Cain)  Orthopedics  [address] [facility] [facility] [facility], [address]  [place] [address]  Phone: [phone]  Fax: [phone]  Follow Up Time:     Dr. Jaime,   Phone: [phone]  Fax: (   )    -  Follow Up Time:     Elif Herman)  Infectious Disease; Internal Medicine  [address] [place] [address]  Phone: [phone]  Fax: [phone]  Follow Up Time:

## 2019-05-21 NOTE — PROGRESS NOTE ADULT - SUBJECTIVE AND OBJECTIVE BOX
Patient is a 53y old  Female who presents with a chief complaint of ankle swelling (21 May 2019 08:34)      SUBJECTIVE / OVERNIGHT EVENTS: SBP 70s and received 500cc bolus. No symptoms of chest pain, dizziness, no CAD hx    MEDICATIONS  (STANDING):  amitriptyline 25 milliGRAM(s) Oral at bedtime  clindamycin IVPB 600 milliGRAM(s) IV Intermittent every 8 hours  lactobacillus acidophilus 1 Tablet(s) Oral two times a day  levothyroxine 25 MICROGram(s) Oral daily  QUEtiapine 200 milliGRAM(s) Oral at bedtime  sodium chloride 0.45%. 500 milliLiter(s) (999 mL/Hr) IV Continuous <Continuous>    MEDICATIONS  (PRN):  acetaminophen   Tablet .. 650 milliGRAM(s) Oral every 6 hours PRN Mild Pain (1 - 3)  oxyCODONE    IR 5 milliGRAM(s) Oral every 4 hours PRN moderate to severe pain (4-10)      T(C): 36.7 (19 @ 10:33), Max: 36.8 (19 @ 17:54)  HR: 89 (19 @ 10:33) (89 - 105)  BP: 96/59 (19 @ 10:33) (79/50 - 129/78)  RR: 18 (19 @ 10:33) (15 - 18)  SpO2: 96% (19 @ 10:33) (95% - 97%)  CAPILLARY BLOOD GLUCOSE    I&O's Summary    GENERAL: NAD  EYES: sclera clear  CHEST/LUNG: Clear to auscultation bilaterally; No wheeze or crackles  HEART: s1/s2  ABDOMEN: Soft, Nontender, Nondistended; Bowel sounds present  EXTREMITIES: left ankle erythema and swelling of lateral and medial malleolus. 2+ Peripheral Pulses, No clubbing, or cyanosis  PSYCH: awake, alert, responds appropriately    LABS:                        11.8   7.82  )-----------( 278      ( 21 May 2019 06:00 )             35.4         143  |  107  |  21  ----------------------------<  86  4.0   |  25  |  0.72    Ca    9.5      21 May 2019 06:00      PT/INR - ( 21 May 2019 06:00 )   PT: 12.0 SEC;   INR: 1.05          PTT - ( 21 May 2019 06:00 )  PTT:31.0 SEC      Urinalysis Basic - ( 20 May 2019 12:50 )    Color: LIGHT YELLOW / Appearance: CLEAR / S.019 / pH: 6.5  Gluc: NEGATIVE / Ketone: NEGATIVE  / Bili: NEGATIVE / Urobili: NORMAL   Blood: NEGATIVE / Protein: NEGATIVE / Nitrite: NEGATIVE   Leuk Esterase: TRACE / RBC: 0-2 / WBC 0-2   Sq Epi: OCC / Non Sq Epi: x / Bacteria: NEGATIVE        RADIOLOGY & ADDITIONAL TESTS:

## 2019-05-21 NOTE — PROGRESS NOTE ADULT - PROBLEM SELECTOR PLAN 1
OR 5/21/2019 for L ankle abscess  No active chest pain or respiratory issues. Lungs clear  EKG personally reviewed and is without ischemic changes, CXR personally reviewed and is clear without consolidations or effusions.  RCRI score of 0. Patient is low risk for proposed procedure. No further cardiac workup required prior to OR

## 2019-05-21 NOTE — DISCHARGE NOTE PROVIDER - PROVIDER TOKENS
PROVIDER:[TOKEN:[[id number]]] PROVIDER:[TOKEN:[[id number]]],FREE:[LAST:[Dr. Jaime],PHONE:[phone]],FAX:[(   )    -]] PROVIDER:[TOKEN:[[id number]]],FREE:[LAST:[Dr. Jaime],PHONE:[phone]],FAX:[(   )    -]],PROVIDER:[TOKEN:[[id number]]]

## 2019-05-22 LAB
ANION GAP SERPL CALC-SCNC: 16 MMO/L — HIGH (ref 7–14)
BUN SERPL-MCNC: 25 MG/DL — HIGH (ref 7–23)
CALCIUM SERPL-MCNC: 9.3 MG/DL — SIGNIFICANT CHANGE UP (ref 8.4–10.5)
CHLORIDE SERPL-SCNC: 101 MMOL/L — SIGNIFICANT CHANGE UP (ref 98–107)
CO2 SERPL-SCNC: 21 MMOL/L — LOW (ref 22–31)
CREAT SERPL-MCNC: 0.62 MG/DL — SIGNIFICANT CHANGE UP (ref 0.5–1.3)
GLUCOSE SERPL-MCNC: 107 MG/DL — HIGH (ref 70–99)
GRAM STN WND: SIGNIFICANT CHANGE UP
GRAM STN WND: SIGNIFICANT CHANGE UP
HCT VFR BLD CALC: 37.9 % — SIGNIFICANT CHANGE UP (ref 34.5–45)
HGB BLD-MCNC: 12.7 G/DL — SIGNIFICANT CHANGE UP (ref 11.5–15.5)
MCHC RBC-ENTMCNC: 30.8 PG — SIGNIFICANT CHANGE UP (ref 27–34)
MCHC RBC-ENTMCNC: 33.5 % — SIGNIFICANT CHANGE UP (ref 32–36)
MCV RBC AUTO: 91.8 FL — SIGNIFICANT CHANGE UP (ref 80–100)
NRBC # FLD: 0 K/UL — SIGNIFICANT CHANGE UP (ref 0–0)
PLATELET # BLD AUTO: 306 K/UL — SIGNIFICANT CHANGE UP (ref 150–400)
PMV BLD: 9 FL — SIGNIFICANT CHANGE UP (ref 7–13)
POTASSIUM SERPL-MCNC: 4.2 MMOL/L — SIGNIFICANT CHANGE UP (ref 3.5–5.3)
POTASSIUM SERPL-SCNC: 4.2 MMOL/L — SIGNIFICANT CHANGE UP (ref 3.5–5.3)
RBC # BLD: 4.13 M/UL — SIGNIFICANT CHANGE UP (ref 3.8–5.2)
RBC # FLD: 11.9 % — SIGNIFICANT CHANGE UP (ref 10.3–14.5)
SODIUM SERPL-SCNC: 138 MMOL/L — SIGNIFICANT CHANGE UP (ref 135–145)
SPECIMEN SOURCE: SIGNIFICANT CHANGE UP
SPECIMEN SOURCE: SIGNIFICANT CHANGE UP
WBC # BLD: 7.4 K/UL — SIGNIFICANT CHANGE UP (ref 3.8–10.5)
WBC # FLD AUTO: 7.4 K/UL — SIGNIFICANT CHANGE UP (ref 3.8–10.5)

## 2019-05-22 PROCEDURE — 99233 SBSQ HOSP IP/OBS HIGH 50: CPT

## 2019-05-22 RX ORDER — CEFAZOLIN SODIUM 1 G
VIAL (EA) INJECTION
Refills: 0 | Status: DISCONTINUED | OUTPATIENT
Start: 2019-05-22 | End: 2019-05-30

## 2019-05-22 RX ORDER — ASPIRIN/CALCIUM CARB/MAGNESIUM 324 MG
81 TABLET ORAL DAILY
Refills: 0 | Status: DISCONTINUED | OUTPATIENT
Start: 2019-05-22 | End: 2019-05-30

## 2019-05-22 RX ORDER — CEFAZOLIN SODIUM 1 G
2000 VIAL (EA) INJECTION EVERY 8 HOURS
Refills: 0 | Status: DISCONTINUED | OUTPATIENT
Start: 2019-05-22 | End: 2019-05-30

## 2019-05-22 RX ORDER — CEFAZOLIN SODIUM 1 G
2000 VIAL (EA) INJECTION ONCE
Refills: 0 | Status: COMPLETED | OUTPATIENT
Start: 2019-05-22 | End: 2019-05-22

## 2019-05-22 RX ADMIN — Medication 100 MILLIGRAM(S): at 15:03

## 2019-05-22 RX ADMIN — Medication 100 MILLIGRAM(S): at 06:50

## 2019-05-22 RX ADMIN — Medication 81 MILLIGRAM(S): at 12:28

## 2019-05-22 RX ADMIN — Medication 1 TABLET(S): at 06:50

## 2019-05-22 RX ADMIN — Medication 100 MILLIGRAM(S): at 22:24

## 2019-05-22 RX ADMIN — Medication 25 MICROGRAM(S): at 06:50

## 2019-05-22 RX ADMIN — Medication 1 TABLET(S): at 17:58

## 2019-05-22 RX ADMIN — Medication 25 MILLIGRAM(S): at 22:24

## 2019-05-22 RX ADMIN — QUETIAPINE FUMARATE 200 MILLIGRAM(S): 200 TABLET, FILM COATED ORAL at 22:24

## 2019-05-22 NOTE — PHYSICAL THERAPY INITIAL EVALUATION ADULT - RANGE OF MOTION EXAMINATION, REHAB EVAL
Left hip/knee ROM WFL, ankle ROM not assessed/Right LE ROM was WFL (within functional limits)/bilateral upper extremity ROM was WFL (within functional limits)

## 2019-05-22 NOTE — PROGRESS NOTE ADULT - SUBJECTIVE AND OBJECTIVE BOX
ANESTHESIA POSTOP CHECK    53y Female POSTOP DAY 1 S/P vac placement    Vital Signs Last 24 Hrs  T(C): 36.6 (22 May 2019 06:47), Max: 37.3 (21 May 2019 21:36)  T(F): 97.8 (22 May 2019 06:47), Max: 99.1 (21 May 2019 21:36)  HR: 100 (22 May 2019 06:47) (80 - 114)  BP: 117/67 (22 May 2019 06:47) (107/63 - 139/99)  BP(mean): 81 (22 May 2019 02:00) (63 - 85)  RR: 18 (22 May 2019 06:47) (6 - 19)  SpO2: 98% (22 May 2019 06:47) (95% - 100%)  I&O's Summary    21 May 2019 07:01  -  22 May 2019 07:00  --------------------------------------------------------  IN: 550 mL / OUT: 15 mL / NET: 535 mL        [ x] NO APPARENT ANESTHESIA COMPLICATIONS      Comments:

## 2019-05-22 NOTE — PROGRESS NOTE ADULT - SUBJECTIVE AND OBJECTIVE BOX
Orthopedic Surgery Progress Note  S: Pain is well controlled.  No nausea, vomiting, chest pain, shortness of breath, lightheadedness, or dizziness.    O:  Vital Signs Last 24 Hrs  T(C): 36 (21 May 2019 22:55), Max: 37.3 (21 May 2019 21:36)  T(F): 96.8 (21 May 2019 22:55), Max: 99.1 (21 May 2019 21:36)  HR: 90 (21 May 2019 23:45) (80 - 114)  BP: 111/68 (21 May 2019 23:45) (79/50 - 139/99)  BP(mean): 77 (21 May 2019 23:45) (72 - 85)  RR: 8 (21 May 2019 23:45) (6 - 19)  SpO2: 100% (21 May 2019 23:45) (96% - 100%)    Gen: NAD  LLE  Dressing clean, dry, intact; wound vac in place, LUI in place with SS output  EHL/FHL/TA/GS intact  SILT DP/SP/Osborn/Sa  WWP distally                        11.8   7.82  )-----------( 278      ( 21 May 2019 06:00 )             35.4                         12.8   7.26  )-----------( 301      ( 20 May 2019 11:27 )             39.3     05-21    143  |  107  |  21  ----------------------------<  86  4.0   |  25  |  0.72    PT/INR - ( 21 May 2019 06:00 )   PT: 12.0 SEC;   INR: 1.05        PTT - ( 21 May 2019 06:00 )  PTT:31.0 SEC    A/P 53y year old Female POD1 s/p L ankle exploration, arthrotomy, I&D and vac placement    Pain Control  NWB LLE  Wound vac  FU LUI output  FU Cxs  Clindamycin  FU ID recs  PT/OOB  DVT PPx: venodynes  Dispo Planning    Merritt Mendez MD

## 2019-05-22 NOTE — PHYSICAL THERAPY INITIAL EVALUATION ADULT - PERTINENT HX OF CURRENT PROBLEM, REHAB EVAL
Patient is a 53 year old female admitted to [facility] on 5/18 with worsening Left LE swelling and redness over the past 1 week. patient is s/p excision of Left ankle infected mass by ortho Dr. Cain on 3/28. Cultures grew MSSA. PMH: C-diff, CVA and seizure d/o, hodgkin's lymphoma c/b acquired hypothyroidism, depression.

## 2019-05-22 NOTE — PHYSICAL THERAPY INITIAL EVALUATION ADULT - ADDITIONAL COMMENTS
Patient reports she lives with her  in a private house, 8-10 steps to enter, however reports can walk in through basement without negotiating stairs. Patient reports she was previously independent in all ADLs and did not use an assistive device for ambulation, however has been using crutches for ankle pain.    Patient was left semi-supine in bed as found, all lines/tubes intact and call quintana within reach, SUSHMA ibarra

## 2019-05-22 NOTE — PROGRESS NOTE ADULT - PROBLEM SELECTOR PLAN 1
Switched to IV ancef per ID recs, duration TBD  MRI left ankle with 5.8 x2.2 x1.3cm abscess s/p left ankle exploration, arthrotomy, I&D and vac placement on 5/21, LLE elevation  On ASA for DVT ppx per ortho, PT eval: home PT  OR path pending, Bcx, fungal cx NGTD

## 2019-05-22 NOTE — PROGRESS NOTE ADULT - SUBJECTIVE AND OBJECTIVE BOX
f/u left ankle swelling/infection    Patient is a 53y old  Female who presents with a chief complaint of cellulitis    Interval History/ROS:  post-op D#1 s/p L ankle exploration, arthrotomy, I&D and vac placement.   no fever.  fussy.  no diarrhea.  no n/v.  no abdominal pain.  Remainder of ROS otherwise negative.    PRE-OP DIAGNOSIS:  Septic arthritis of ankle 21-May-2019 22:57:14  Ruddy Vela.  ·  POST-OP DIAGNOSIS:  Septic arthritis of ankle 21-May-2019 22:57:22  Ruddy Vela.    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, the pressure is not high&quot;  Depression  History of seizure: x1-2007  H/O lymph node biopsy: &quot;mediastinal&quot;-2005  History of appendectomy: as a child  History of tonsillectomy: as a child    Allergies  penicillin (Rash)    ANTIMICROBIALS:    clindamycin IVPB 600 every 8 hours (5/16-)    MEDICATIONS  (STANDING):  amitriptyline 25 at bedtime  aspirin  chewable 81 daily  levothyroxine 25 daily  QUEtiapine 200 at bedtime    Vital Signs Last 24 Hrs  T(F): 97.8 (05-22-19 @ 06:47), Max: 99.1 (05-21-19 @ 21:36)  HR: 100 (05-22-19 @ 06:47)  BP: 117/67 (05-22-19 @ 06:47)  RR: 18 (05-22-19 @ 06:47)  SpO2: 98% (05-22-19 @ 06:47) (95% - 100%)    PHYSICAL EXAM:  General: non-toxic  HEAD/EYES: anicteric  ENT:  supple  Cardiovascular:   S1, S2  Respiratory:  clear bilaterally  GI:  soft, non-tender, normal bowel sounds  :  no betancur  Musculoskeletal:  swollen L ankle that is post-op and with VAC.  limited exam  Neurologic:  grossly non-focal  Skin:  no rash  Psychiatric:  irritable  Vascular:  no phlebitis    Sedimentation Rate, Erythrocyte: 20 (05-17-19)  Sedimentation Rate, Erythrocyte: 7 (05-13-19)    C-Reactive Protein, Serum: 14.4 (05-13-19)                             12.7   7.40  )-----------( 306      ( 22 May 2019 06:30 )             37.9 05-22    138  |  101  |  25  ----------------------------<  107  4.2   |  21  |  0.62  Ca    9.3      22 May 2019 06:30    Surgical Pathology Report:   Final Diagnosis  1. Soft tissue mass, left ankle, excision:   - Fibroconnective tissue with degenerated mucin-like material and focal foreign body type giant cell reaction.  2. Soft tissue mass, left ankle, excision:  - Fibroconnective tissue with foci of necrosis, degenerated mucin-like material, granulomatous inflammation, and focal foreign body type giant cell reaction.  - Negative for Fungal organisms (GMS stain).  - AFB stain to be reported as addendum.  (03.28.19 @ 20:03)    MICROBIOLOGY:  Culture - Fungal, Blood (05.20.19 @ 15:01)    Culture - Fungal, Blood:   CULTURE NEGATIVE FOR YEASTS AND MOLDS-PRELIMINARY RESULT  AFTER 24 HOURS INCUBATION    Specimen Source: BLOOD    Culture - Blood (05.16.19 @ 23:18)    Culture - Blood:   NO ORGANISMS ISOLATED    Specimen Source: BLOOD VENOUS    Culture - Yeast and Fungus (03.28.19 @ 20:24)  CULTURE NEGATIVE FOR YEASTS AND MOLDS   AFTER 4 WEEKS    Specimen Source: OTHER    Culture - Surg Site Aerob/Anaer w/Gm St (03.28.19 @ 20:24)    Gram Stain Wound:   NOS No Organisms Seen  Culture - Blood (05.16.19 @ 23:18)    Culture - Blood:   NO ORGANISMS ISOLATED    Specimen Source: BLOOD PERIPHERAL    WBC^White Blood Cells  QNTY CELLS IN GRAM STAIN: RARE (1+)    -  Cefazolin: S <=4 JOSE    -  Ciprofloxacin: S <=1 JOSE    -  Clindamycin: S    -  Erythromycin: S    -  Gentamicin: S <=1 JOSE    -  Levofloxacin: S <=0.5 JOSE    -  Moxifloxacin(Aerobic): S <=0.5 JOSE    -  Oxacillin: S <=0.25 JOSE    -  Rifampin: S <=1 JOSE    -  Tetra/Doxy: S <=1 JOSE    -  Trimethoprim/Sulfamethoxazole: S <=0.5/9.5 JOSE    -  Vancomycin: S 1 JOSE    Culture - Surgical Site:   RARE    Specimen Source: OTHER    Organism Identification: Staphylococcus aureus    RADIOLOGY:  imaging below personally reviewed    Xray Ankle 2 Views, Left (05.16.19 @ 21:30) >  IMPRESSION:  Soft tissue swelling of the left ankle. Small joint effusion. No radiographic evidence of osteomyelitis. No acute fracture.    MR Ankle w/ IV Cont, Left (05.17.19 @ 19:55) >  Impression: 5.8 x 2.2x 1.3 cm abscess anterior laterally at the level of the ankle. Large tibiotalar joint effusion with marked synovitis and marrow signal changes within the talus, distal tibia and distal fibula suggestive of septic arthritis/osteomyelitis. Area of nonenhancement within the lateral talus suspicious for ischemia or infarction.    1/27/19 - MRI left ankle - non-specific diffuse masslike synovial thickening of the tibiotalar joint presumably herniating through the syndesmosis and extending towards the anterolateral aspect of the ankle, corresponding to the area of palpable abnormality.  Findings are concerning for a synovial process.  Possibilities include PVNS (pigmented villonodular synovitis), inflammatory arthropathy, gout, and other etiologies.    11/14/17 - MRI left ankle - finding of synovitis with fluid and synovial thickening at the tibiotalar joint. subchondral erosions are seen at the tip of the fibula and at the base of the second metatarsal.  The findings are overall non-specific although could be seen with rheumatoid arthritis.

## 2019-05-22 NOTE — PHYSICAL THERAPY INITIAL EVALUATION ADULT - PATIENT PROFILE REVIEW, REHAB EVAL
yes/PT orders received:  no formal activity order. Consult with RN Alcira SANDHU, pt may participate in PT evaluation and ambulate with PT.

## 2019-05-22 NOTE — PROGRESS NOTE ADULT - SUBJECTIVE AND OBJECTIVE BOX
Patient is a 53y old  Female who presents with a chief complaint of ankle swelling (22 May 2019 11:43)      SUBJECTIVE / OVERNIGHT EVENTS: No acute events overnight. Feeling better after OR, pain controlled. No N/V/D    MEDICATIONS  (STANDING):  amitriptyline 25 milliGRAM(s) Oral at bedtime  aspirin  chewable 81 milliGRAM(s) Oral daily  ceFAZolin   IVPB      ceFAZolin   IVPB 2000 milliGRAM(s) IV Intermittent once  ceFAZolin   IVPB 2000 milliGRAM(s) IV Intermittent every 8 hours  lactobacillus acidophilus 1 Tablet(s) Oral two times a day  levothyroxine 25 MICROGram(s) Oral daily  QUEtiapine 200 milliGRAM(s) Oral at bedtime  sodium chloride 0.45%. 500 milliLiter(s) (999 mL/Hr) IV Continuous <Continuous>  sodium chloride 0.9%. 1000 milliLiter(s) (50 mL/Hr) IV Continuous <Continuous>    MEDICATIONS  (PRN):  acetaminophen   Tablet .. 650 milliGRAM(s) Oral every 6 hours PRN Mild Pain (1 - 3)  oxyCODONE    IR 5 milliGRAM(s) Oral every 4 hours PRN moderate to severe pain (4-10)      T(C): 36.4 (05-22-19 @ 11:00), Max: 37.3 (05-21-19 @ 21:36)  HR: 95 (05-22-19 @ 11:00) (80 - 114)  BP: 107/66 (05-22-19 @ 11:00) (107/63 - 139/99)  RR: 19 (05-22-19 @ 11:00) (6 - 19)  SpO2: 100% (05-22-19 @ 11:00) (95% - 100%)  CAPILLARY BLOOD GLUCOSE    I&O's Summary    21 May 2019 07:01  -  22 May 2019 07:00  --------------------------------------------------------  IN: 550 mL / OUT: 15 mL / NET: 535 mL    PHYSICAL EXAM  GENERAL: NAD  EYES: sclera clear  CHEST/LUNG: Clear to auscultation bilaterally; No wheeze or crackles  HEART: s1/s2  ABDOMEN: Soft, Nontender, Nondistended; Bowel sounds present  EXTREMITIES: left ankle with LUI drain with serosanguinous drainage and attached to wound vac, 2+ Peripheral Pulses, No clubbing, or cyanosis  PSYCH: awake, alert, responds appropriately      LABS:                        12.7   7.40  )-----------( 306      ( 22 May 2019 06:30 )             37.9     05-22    138  |  101  |  25<H>  ----------------------------<  107<H>  4.2   |  21<L>  |  0.62    Ca    9.3      22 May 2019 06:30      PT/INR - ( 21 May 2019 06:00 )   PT: 12.0 SEC;   INR: 1.05          PTT - ( 21 May 2019 06:00 )  PTT:31.0 SEC          RADIOLOGY & ADDITIONAL TESTS:

## 2019-05-22 NOTE — PHYSICAL THERAPY INITIAL EVALUATION ADULT - GAIT DEVIATIONS NOTED, PT EVAL
decreased velocity of limb motion/decreased step length/decreased stride length/decreased weight-shifting ability/decreased maxx

## 2019-05-22 NOTE — PROGRESS NOTE ADULT - SUBJECTIVE AND OBJECTIVE BOX
Orthopedic Surgery Progress Note  S: Patient having pain, but it is bearable.  No nausea, vomiting, chest pain, shortness of breath, lightheadedness, or dizziness.    O:  Vital Signs Last 24 Hrs  T(C): 36.7 (22 May 2019 03:10), Max: 37.3 (21 May 2019 21:36)  T(F): 98 (22 May 2019 03:10), Max: 99.1 (21 May 2019 21:36)  HR: 100 (22 May 2019 03:10) (80 - 114)  BP: 125/77 (22 May 2019 03:10) (96/59 - 139/99)  BP(mean): 81 (22 May 2019 02:00) (63 - 85)  RR: 18 (22 May 2019 03:10) (6 - 19)  SpO2: 97% (22 May 2019 03:10) (95% - 100%)    Gen: NAD  LLE  Dressing clean, dry, intact; wound vac in place, LUI in place with SS output  EHL/FHL/TA/GS intact  SILT DP/SP/Osborn/Sa  WWP distally                        11.8   7.82  )-----------( 278      ( 21 May 2019 06:00 )             35.4                         12.8   7.26  )-----------( 301      ( 20 May 2019 11:27 )             39.3     05-21    143  |  107  |  21  ----------------------------<  86  4.0   |  25  |  0.72    PT/INR - ( 21 May 2019 06:00 )   PT: 12.0 SEC;   INR: 1.05        PTT - ( 21 May 2019 06:00 )  PTT:31.0 SEC    A/P 53y year old Female POD1 s/p L ankle exploration, arthrotomy, I&D and vac placement    Pain Control  NWB LLE  Wound vac  FU LUI output  FU Cxs  Clindamycin  FU ID recs  PT/OOB  DVT PPx: venodynes  Dispo Planning    Merritt Mendez MD

## 2019-05-23 LAB
ANION GAP SERPL CALC-SCNC: 10 MMO/L — SIGNIFICANT CHANGE UP (ref 7–14)
BUN SERPL-MCNC: 22 MG/DL — SIGNIFICANT CHANGE UP (ref 7–23)
CALCIUM SERPL-MCNC: 9.2 MG/DL — SIGNIFICANT CHANGE UP (ref 8.4–10.5)
CHLORIDE SERPL-SCNC: 107 MMOL/L — SIGNIFICANT CHANGE UP (ref 98–107)
CO2 SERPL-SCNC: 25 MMOL/L — SIGNIFICANT CHANGE UP (ref 22–31)
CREAT SERPL-MCNC: 0.7 MG/DL — SIGNIFICANT CHANGE UP (ref 0.5–1.3)
GLUCOSE SERPL-MCNC: 86 MG/DL — SIGNIFICANT CHANGE UP (ref 70–99)
HCT VFR BLD CALC: 33.9 % — LOW (ref 34.5–45)
HGB BLD-MCNC: 11.2 G/DL — LOW (ref 11.5–15.5)
MAGNESIUM SERPL-MCNC: 1.9 MG/DL — SIGNIFICANT CHANGE UP (ref 1.6–2.6)
MCHC RBC-ENTMCNC: 30 PG — SIGNIFICANT CHANGE UP (ref 27–34)
MCHC RBC-ENTMCNC: 33 % — SIGNIFICANT CHANGE UP (ref 32–36)
MCV RBC AUTO: 90.9 FL — SIGNIFICANT CHANGE UP (ref 80–100)
NRBC # FLD: 0 K/UL — SIGNIFICANT CHANGE UP (ref 0–0)
PHOSPHATE SERPL-MCNC: 3.1 MG/DL — SIGNIFICANT CHANGE UP (ref 2.5–4.5)
PLATELET # BLD AUTO: 300 K/UL — SIGNIFICANT CHANGE UP (ref 150–400)
PMV BLD: 9 FL — SIGNIFICANT CHANGE UP (ref 7–13)
POTASSIUM SERPL-MCNC: 4 MMOL/L — SIGNIFICANT CHANGE UP (ref 3.5–5.3)
POTASSIUM SERPL-SCNC: 4 MMOL/L — SIGNIFICANT CHANGE UP (ref 3.5–5.3)
RBC # BLD: 3.73 M/UL — LOW (ref 3.8–5.2)
RBC # FLD: 12.1 % — SIGNIFICANT CHANGE UP (ref 10.3–14.5)
SODIUM SERPL-SCNC: 142 MMOL/L — SIGNIFICANT CHANGE UP (ref 135–145)
WBC # BLD: 9.5 K/UL — SIGNIFICANT CHANGE UP (ref 3.8–10.5)
WBC # FLD AUTO: 9.5 K/UL — SIGNIFICANT CHANGE UP (ref 3.8–10.5)

## 2019-05-23 PROCEDURE — 99233 SBSQ HOSP IP/OBS HIGH 50: CPT

## 2019-05-23 RX ORDER — CEFAZOLIN SODIUM 1 G
2 VIAL (EA) INJECTION
Qty: 320 | Refills: 0
Start: 2019-05-23 | End: 2019-06-11

## 2019-05-23 RX ORDER — OXYCODONE HYDROCHLORIDE 5 MG/1
5 TABLET ORAL EVERY 4 HOURS
Refills: 0 | Status: DISCONTINUED | OUTPATIENT
Start: 2019-05-23 | End: 2019-05-30

## 2019-05-23 RX ADMIN — Medication 1 TABLET(S): at 17:55

## 2019-05-23 RX ADMIN — Medication 25 MICROGRAM(S): at 06:44

## 2019-05-23 RX ADMIN — Medication 100 MILLIGRAM(S): at 22:11

## 2019-05-23 RX ADMIN — Medication 100 MILLIGRAM(S): at 06:44

## 2019-05-23 RX ADMIN — Medication 25 MILLIGRAM(S): at 22:12

## 2019-05-23 RX ADMIN — Medication 81 MILLIGRAM(S): at 12:17

## 2019-05-23 RX ADMIN — Medication 1 TABLET(S): at 06:43

## 2019-05-23 RX ADMIN — QUETIAPINE FUMARATE 200 MILLIGRAM(S): 200 TABLET, FILM COATED ORAL at 22:12

## 2019-05-23 RX ADMIN — Medication 100 MILLIGRAM(S): at 14:28

## 2019-05-23 NOTE — PROGRESS NOTE ADULT - SUBJECTIVE AND OBJECTIVE BOX
Orthopedic Surgery Progress Note  S: Pain is well controlled.  No nausea, vomiting, chest pain, shortness of breath, lightheadedness, or dizziness.    O:  Vital Signs Last 24 Hrs  T(C): 36.6 (23 May 2019 01:18), Max: 37.1 (22 May 2019 22:22)  T(F): 97.9 (23 May 2019 01:18), Max: 98.8 (22 May 2019 22:22)  HR: 100 (23 May 2019 01:18) (95 - 100)  BP: 94/68 (23 May 2019 01:18) (94/68 - 117/67)  RR: 17 (23 May 2019 01:18) (17 - 19)  SpO2: 93% (23 May 2019 01:18) (93% - 100%)    Gen: NAD  LLE  Dressing clean, dry, intact; wound vac in place, LUI in place with SS output  EHL/FHL/TA/GS intact  SILT DP/SP/Osborn/Sa  WWP distally                        12.7   7.40  )-----------( 306      ( 22 May 2019 06:30 )             37.9     05-22    138  |  101  |  25<H>  ----------------------------<  107<H>  4.2   |  21<L>  |  0.62    A/P 53y year old Female POD2 s/p L ankle exploration, arthrotomy, I&D and vac placement    Pain Control  NWB LLE  Wound vac  FU LUI output  FU Cxs  Ancef  FU ID recs  PT/OOB  DVT PPx: venodynes, [id number]  Dispo Planning    Merritt Mendez MD

## 2019-05-23 NOTE — PROGRESS NOTE ADULT - PROBLEM SELECTOR PLAN 5
She says she takes alternative japanese herbs for CVA instead She says she takes alternative japanese herbs for CVA   Refused ASA

## 2019-05-23 NOTE — PROGRESS NOTE ADULT - SUBJECTIVE AND OBJECTIVE BOX
Patient is a 53y old  Female who presents with a chief complaint of ankle swelling (23 May 2019 12:13)      SUBJECTIVE / OVERNIGHT EVENTS: No acute events overnight    MEDICATIONS  (STANDING):  amitriptyline 25 milliGRAM(s) Oral at bedtime  aspirin  chewable 81 milliGRAM(s) Oral daily  ceFAZolin   IVPB      ceFAZolin   IVPB 2000 milliGRAM(s) IV Intermittent every 8 hours  lactobacillus acidophilus 1 Tablet(s) Oral two times a day  levothyroxine 25 MICROGram(s) Oral daily  QUEtiapine 200 milliGRAM(s) Oral at bedtime  sodium chloride 0.45%. 500 milliLiter(s) (999 mL/Hr) IV Continuous <Continuous>  sodium chloride 0.9%. 1000 milliLiter(s) (50 mL/Hr) IV Continuous <Continuous>    MEDICATIONS  (PRN):  acetaminophen   Tablet .. 650 milliGRAM(s) Oral every 6 hours PRN Mild Pain (1 - 3)  oxyCODONE    IR 5 milliGRAM(s) Oral every 4 hours PRN moderate to severe pain (4-10)      T(C): 37.1 (05-23-19 @ 14:04), Max: 37.1 (05-22-19 @ 22:22)  HR: 96 (05-23-19 @ 14:04) (94 - 100)  BP: 116/73 (05-23-19 @ 14:04) (94/68 - 116/73)  RR: 18 (05-23-19 @ 14:04) (17 - 19)  SpO2: 95% (05-23-19 @ 14:04) (93% - 98%)  CAPILLARY BLOOD GLUCOSE        I&O's Summary    22 May 2019 07:01  -  23 May 2019 07:00  --------------------------------------------------------  IN: 0 mL / OUT: 1795 mL / NET: -1795 mL    23 May 2019 07:01  -  23 May 2019 14:36  --------------------------------------------------------  IN: 480 mL / OUT: 350 mL / NET: 130 mL        PHYSICAL EXAM:  GENERAL: no apparent distress, on room air  HEAD:  Atraumatic, Normocephalic  EYES: EOMI, PERRLA, conjunctiva and sclera clear b/l  CHEST/LUNG: Clear to auscultation bilaterally; No wheezing or crackles  HEART: Regular rate and rhythm; No murmurs, rubs, or gallops  ABDOMEN: Soft, Nontender, Nondistended; Bowel sounds present  EXTREMITIES:  2+ Peripheral Pulses, No clubbing, cyanosis, or edema  MSK: no joint effusions  Back: no spinal tenderness  NEUROLOGY: awake, alert, responds to Qs appropriately, no sensory or motor deficits, 5/5 muscle strength equal in all extremities, CN 2-12 grossly intact  SKIN: No rashes or lesions    LABS:                        11.2   9.50  )-----------( 300      ( 23 May 2019 06:19 )             33.9     05-23    142  |  107  |  22  ----------------------------<  86  4.0   |  25  |  0.70    Ca    9.2      23 May 2019 06:19  Phos  3.1     05-23  Mg     1.9     05-23                RADIOLOGY & ADDITIONAL TESTS: Patient is a 53y old  Female who presents with a chief complaint of ankle swelling (23 May 2019 12:13)      SUBJECTIVE / OVERNIGHT EVENTS: No acute events overnight. Pain controlled. No rash, itching, diarrhea, had brief episode of nausea which self resolved and without vomiting. No abd pain.    MEDICATIONS  (STANDING):  amitriptyline 25 milliGRAM(s) Oral at bedtime  aspirin  chewable 81 milliGRAM(s) Oral daily  ceFAZolin   IVPB      ceFAZolin   IVPB 2000 milliGRAM(s) IV Intermittent every 8 hours  lactobacillus acidophilus 1 Tablet(s) Oral two times a day  levothyroxine 25 MICROGram(s) Oral daily  QUEtiapine 200 milliGRAM(s) Oral at bedtime  sodium chloride 0.45%. 500 milliLiter(s) (999 mL/Hr) IV Continuous <Continuous>  sodium chloride 0.9%. 1000 milliLiter(s) (50 mL/Hr) IV Continuous <Continuous>    MEDICATIONS  (PRN):  acetaminophen   Tablet .. 650 milliGRAM(s) Oral every 6 hours PRN Mild Pain (1 - 3)  oxyCODONE    IR 5 milliGRAM(s) Oral every 4 hours PRN moderate to severe pain (4-10)      T(C): 37.1 (05-23-19 @ 14:04), Max: 37.1 (05-22-19 @ 22:22)  HR: 96 (05-23-19 @ 14:04) (94 - 100)  BP: 116/73 (05-23-19 @ 14:04) (94/68 - 116/73)  RR: 18 (05-23-19 @ 14:04) (17 - 19)  SpO2: 95% (05-23-19 @ 14:04) (93% - 98%)  CAPILLARY BLOOD GLUCOSE        I&O's Summary    22 May 2019 07:01  -  23 May 2019 07:00  --------------------------------------------------------  IN: 0 mL / OUT: 1795 mL / NET: -1795 mL    23 May 2019 07:01  -  23 May 2019 14:36  --------------------------------------------------------  IN: 480 mL / OUT: 350 mL / NET: 130 mL    PHYSICAL EXAM  GENERAL: NAD  EYES: sclera clear  CHEST/LUNG: Clear to auscultation bilaterally; No wheeze or crackles  HEART: s1/s2  ABDOMEN: Soft, Nontender, Nondistended; Bowel sounds present  EXTREMITIES: left ankle with LUI drain with serosanguinous drainage and attached to wound vac, 2+ Peripheral Pulses, No clubbing, or cyanosis  PSYCH: awake, alert, responds appropriately    LABS:                        11.2   9.50  )-----------( 300      ( 23 May 2019 06:19 )             33.9     05-23    142  |  107  |  22  ----------------------------<  86  4.0   |  25  |  0.70    Ca    9.2      23 May 2019 06:19  Phos  3.1     05-23  Mg     1.9     05-23                RADIOLOGY & ADDITIONAL TESTS:

## 2019-05-23 NOTE — PROGRESS NOTE ADULT - SUBJECTIVE AND OBJECTIVE BOX
f/u left ankle swelling/infection    Patient is a 53y old  Female who presents with a chief complaint of cellulitis    Interval History/ROS:  post-op D#2 s/p L ankle exploration, arthrotomy, I&D and vac placement.   There is no fever.  Tolerating ancef.  there is no n/v/d.  no abdominal pain.  Remainder of ROS otherwise negative.    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, the pressure is not high&quot;  Depression  History of seizure: x1-2007  H/O lymph node biopsy: &quot;mediastinal&quot;-2005  History of appendectomy: as a child  History of tonsillectomy: as a child    Allergies  penicillin (Rash)    ANTIMICROBIALS:    ceFAZolin   IVPB 2000 every 8 hours (anti-mssa tx 5/16-)    MEDICATIONS  (STANDING):  amitriptyline 25 at bedtime  aspirin  chewable 81 daily  levothyroxine 25 daily  QUEtiapine 200 at bedtime    Vital Signs Last 24 Hrs  T(F): 97.8 (05-23-19 @ 09:50), Max: 98.8 (05-22-19 @ 22:22)  HR: 94 (05-23-19 @ 09:50)  BP: 104/71 (05-23-19 @ 09:50)  RR: 18 (05-23-19 @ 09:50)  SpO2: 97% (05-23-19 @ 09:50) (93% - 98%)    PHYSICAL EXAM:  General: non-toxic  HEAD/EYES: anicteric  ENT:  supple  Cardiovascular:   S1, S2  Respiratory:  clear bilaterally  GI:  soft, non-tender, normal bowel sounds  :  no betancur  Musculoskeletal:  swollen L ankle that is post-op and with VAC. ROM is good  Neurologic:  grossly non-focal  Skin:  no rash  Psychiatric: mood irritated  Vascular:  no phlebitis    Sedimentation Rate, Erythrocyte: 23 (05-20-19)  Sedimentation Rate, Erythrocyte: 20 (05-17-19)  Sedimentation Rate, Erythrocyte: 7 (05-13-19)              C-Reactive Protein, Serum: 8.2 (05-20-19)  C-Reactive Protein, Serum: 14.4 (05-13-19)                          11.2   9.50  )-----------( 300      ( 23 May 2019 06:19 )             33.9 05-23    142  |  107  |  22  ----------------------------<  86  4.0   |  25  |  0.70  Ca    9.2      23 May 2019 06:19Phos  3.1     05-23Mg     1.9     05-23    Surgical Pathology Report:   Final Diagnosis  1. Soft tissue mass, left ankle, excision:   - Fibroconnective tissue with degenerated mucin-like material and focal foreign body type giant cell reaction.  2. Soft tissue mass, left ankle, excision:  - Fibroconnective tissue with foci of necrosis, degenerated mucin-like material, granulomatous inflammation, and focal foreign body type giant cell reaction.  - Negative for Fungal organisms (GMS stain).  - AFB stain to be reported as addendum.  (03.28.19 @ 20:03)    MICROBIOLOGY:  Culture - Surg Site Aerob/Anaer w/Gm St (05.22.19 @ 10:13)    Gram Stain Wound:   NOS^No Organisms Seen  WBC^White Blood Cells  QNTY CELLS IN GRAM STAIN: FEW (2+)    Culture - Surgical Site:   NO GROWTH - PRELIMINARY RESULTS    Specimen Source: ANKLE    Culture - Surg Site Aerob/Anaer w/Gm St (05.22.19 @ 10:09)    Gram Stain Wound:   BLOODY SP.  NOS^No Organisms Seen  WBC^White Blood Cells  QNTY CELLS IN GRAM STAIN: RARE (1+)    Culture - Surgical Site:   NO GROWTH - PRELIMINARY RESULTS    Specimen Source: ANKLE    Culture - Fungal, Blood (05.20.19 @ 15:01)    Culture - Fungal, Blood:   CULTURE NEGATIVE FOR YEASTS AND MOLDS-PRELIMINARY RESULT  AFTER 24 HOURS INCUBATION    Specimen Source: BLOOD    Culture - Blood (05.16.19 @ 23:18)    Culture - Blood:   NO ORGANISMS ISOLATED    Specimen Source: BLOOD VENOUS    Culture - Yeast and Fungus (03.28.19 @ 20:24)  CULTURE NEGATIVE FOR YEASTS AND MOLDS   AFTER 4 WEEKS    Specimen Source: OTHER    Culture - Surg Site Aerob/Anaer w/Gm St (03.28.19 @ 20:24)    Gram Stain Wound:   NOS No Organisms Seen  Culture - Blood (05.16.19 @ 23:18)    Culture - Blood:   NO ORGANISMS ISOLATED    Specimen Source: BLOOD PERIPHERAL    WBC^White Blood Cells  QNTY CELLS IN GRAM STAIN: RARE (1+)    -  Cefazolin: S <=4 JOSE    -  Ciprofloxacin: S <=1 JOSE    -  Clindamycin: S    -  Erythromycin: S    -  Gentamicin: S <=1 JOSE    -  Levofloxacin: S <=0.5 JOSE    -  Moxifloxacin(Aerobic): S <=0.5 JOSE    -  Oxacillin: S <=0.25 JOSE    -  Rifampin: S <=1 JOSE    -  Tetra/Doxy: S <=1 JOSE    -  Trimethoprim/Sulfamethoxazole: S <=0.5/9.5 JOSE    -  Vancomycin: S 1 JOSE    Culture - Surgical Site:   RARE    Specimen Source: OTHER    Organism Identification: Staphylococcus aureus    RADIOLOGY:  imaging below personally reviewed    Xray Ankle 2 Views, Left (05.16.19 @ 21:30) >  IMPRESSION:  Soft tissue swelling of the left ankle. Small joint effusion. No radiographic evidence of osteomyelitis. No acute fracture.    MR Ankle w/ IV Cont, Left (05.17.19 @ 19:55) >  Impression: 5.8 x 2.2x 1.3 cm abscess anterior laterally at the level of the ankle. Large tibiotalar joint effusion with marked synovitis and marrow signal changes within the talus, distal tibia and distal fibula suggestive of septic arthritis/osteomyelitis. Area of nonenhancement within the lateral talus suspicious for ischemia or infarction.    1/27/19 - MRI left ankle - non-specific diffuse masslike synovial thickening of the tibiotalar joint presumably herniating through the syndesmosis and extending towards the anterolateral aspect of the ankle, corresponding to the area of palpable abnormality.  Findings are concerning for a synovial process.  Possibilities include PVNS (pigmented villonodular synovitis), inflammatory arthropathy, gout, and other etiologies.    11/14/17 - MRI left ankle - finding of synovitis with fluid and synovial thickening at the tibiotalar joint. subchondral erosions are seen at the tip of the fibula and at the base of the second metatarsal.  The findings are overall non-specific although could be seen with rheumatoid arthritis.

## 2019-05-23 NOTE — PROGRESS NOTE ADULT - PROBLEM SELECTOR PLAN 1
MRI left ankle with 5.8 x2.2 x1.3cm abscess s/p left ankle exploration, arthrotomy, I&D and vac placement on 5/21, LLE elevation  Ancef 2g IV q8H through 6/12/19 per ID  weekly - cbc, cmp, esr, crp - fax results to [phone]  On ASA for DVT ppx per ortho, PT eval: home PT  OR cultures so far negative, no bone involvement, Bcx, fungal cx NGTD MRI left ankle with 5.8 x2.2 x1.3cm abscess s/p left ankle exploration, arthrotomy, I&D and vac placement on 5/21, LLE elevation  Ancef 2g IV q8H through 6/12/19 per ID, midline to be placed  weekly - cbc, cmp, esr, crp - fax results to [phone]  On ASA for DVT ppx per ortho, PT eval: home PT  OR cultures so far negative, no bone involvement, Bcx, fungal cx NGTD MRI left ankle with 5.8 x2.2 x1.3cm abscess s/p left ankle exploration, arthrotomy, I&D and vac placement on 5/21, Per ortho, new vac needs to be placed 5/27 and can be done at home. Patient needs to followup in Dr. Cain's office  Ancef 2g IV q8H through 6/12/19 per ID, midline to be placed  weekly - cbc, cmp, esr, crp - fax results to [phone]  On ASA for DVT ppx per ortho, PT eval: home PT  OR cultures so far negative, no bone involvement, Bcx, fungal cx NGTD

## 2019-05-24 LAB
SPECIMEN SOURCE: SIGNIFICANT CHANGE UP
SPECIMEN SOURCE: SIGNIFICANT CHANGE UP

## 2019-05-24 PROCEDURE — 99232 SBSQ HOSP IP/OBS MODERATE 35: CPT

## 2019-05-24 PROCEDURE — 36000 PLACE NEEDLE IN VEIN: CPT

## 2019-05-24 RX ORDER — QUETIAPINE FUMARATE 200 MG/1
1 TABLET, FILM COATED ORAL
Qty: 0 | Refills: 0 | DISCHARGE

## 2019-05-24 RX ORDER — LEVOTHYROXINE SODIUM 125 MCG
1 TABLET ORAL
Qty: 30 | Refills: 0
Start: 2019-05-24 | End: 2019-06-22

## 2019-05-24 RX ORDER — AMITRIPTYLINE HCL 25 MG
1 TABLET ORAL
Qty: 0 | Refills: 0 | DISCHARGE
Start: 2019-05-24

## 2019-05-24 RX ORDER — OXYCODONE HYDROCHLORIDE 5 MG/1
1 TABLET ORAL
Qty: 30 | Refills: 0
Start: 2019-05-24 | End: 2019-05-28

## 2019-05-24 RX ORDER — ASPIRIN/CALCIUM CARB/MAGNESIUM 324 MG
1 TABLET ORAL
Qty: 0 | Refills: 0 | DISCHARGE
Start: 2019-05-24

## 2019-05-24 RX ORDER — QUETIAPINE FUMARATE 200 MG/1
1 TABLET, FILM COATED ORAL
Qty: 0 | Refills: 0 | DISCHARGE
Start: 2019-05-24

## 2019-05-24 RX ORDER — ACETAMINOPHEN 500 MG
2 TABLET ORAL
Qty: 0 | Refills: 0 | DISCHARGE
Start: 2019-05-24

## 2019-05-24 RX ADMIN — Medication 1 TABLET(S): at 06:45

## 2019-05-24 RX ADMIN — Medication 1 TABLET(S): at 18:59

## 2019-05-24 RX ADMIN — QUETIAPINE FUMARATE 200 MILLIGRAM(S): 200 TABLET, FILM COATED ORAL at 23:00

## 2019-05-24 RX ADMIN — Medication 100 MILLIGRAM(S): at 23:00

## 2019-05-24 RX ADMIN — Medication 25 MICROGRAM(S): at 06:45

## 2019-05-24 RX ADMIN — Medication 100 MILLIGRAM(S): at 14:10

## 2019-05-24 RX ADMIN — Medication 25 MILLIGRAM(S): at 22:59

## 2019-05-24 RX ADMIN — Medication 100 MILLIGRAM(S): at 06:45

## 2019-05-24 NOTE — PROCEDURE NOTE - NSPROCDETAILS_GEN_ALL_CORE
ultrasound assessment/sterile technique, catheter placed/location identified, draped/prepped, sterile technique used/sterile dressing applied/ultrasound guidance

## 2019-05-24 NOTE — PROGRESS NOTE ADULT - PROBLEM SELECTOR PLAN 1
MRI left ankle with 5.8 x2.2 x1.3cm abscess s/p left ankle exploration, arthrotomy, I&D and vac placement on 5/21, Per ortho, new vac needs to be placed 5/27, then keep on for additional 3 days, wound care ordered. Patient needs to followup in Dr. Cain's office  Ancef 2g IV q8H through 6/12/19 per ID, midline to be placed 5/24  weekly - cbc, cmp, esr, crp - fax results to [phone]  On ASA for DVT ppx per ortho, PT eval: home PT  OR cultures so far negative, no bone involvement, Bcx, fungal cx NGTD

## 2019-05-24 NOTE — PROGRESS NOTE ADULT - SUBJECTIVE AND OBJECTIVE BOX
Ortho Progress Note    S: Patient seen and examined. No acute events overnight. Pain well controlled with current regimen. Tolerating diet.  Patient refuses to go home until wound vac removed and IV abx completed       O:  Physical Exam:  Gen: Laying in bed, NAD, alert and oriented.   Resp: Unlabored breathing  Ext: EHL/FHL/TA/Sol intact          + SILT DP/SP/OG/Sa          +DP, extremity WWP  Wound vac functional, holding suction     Vital Signs Last 24 Hrs  T(C): 36.5 (24 May 2019 02:00), Max: 37.1 (23 May 2019 14:04)  T(F): 97.7 (24 May 2019 02:00), Max: 98.7 (23 May 2019 14:04)  HR: 92 (24 May 2019 02:00) (92 - 96)  BP: 117/75 (24 May 2019 02:00) (104/71 - 117/75)  BP(mean): --  RR: 16 (24 May 2019 02:00) (16 - 18)  SpO2: 97% (24 May 2019 02:00) (95% - 97%)                          11.2   9.50  )-----------( 300      ( 23 May 2019 06:19 )             33.9       05-23    142  |  107  |  22  ----------------------------<  86  4.0   |  25  |  0.70

## 2019-05-24 NOTE — PROGRESS NOTE ADULT - SUBJECTIVE AND OBJECTIVE BOX
INCOMPLETE Patient is a 53y old  Female who presents with a chief complaint of ankle swelling (24 May 2019 10:48)      SUBJECTIVE / OVERNIGHT EVENTS: No acute events overnight. Had some nausea but no vomiting, abd pain or diarrhea. Anxious about self administering Antibiotics.    MEDICATIONS  (STANDING):  amitriptyline 25 milliGRAM(s) Oral at bedtime  aspirin  chewable 81 milliGRAM(s) Oral daily  ceFAZolin   IVPB      ceFAZolin   IVPB 2000 milliGRAM(s) IV Intermittent every 8 hours  lactobacillus acidophilus 1 Tablet(s) Oral two times a day  levothyroxine 25 MICROGram(s) Oral daily  QUEtiapine 200 milliGRAM(s) Oral at bedtime    MEDICATIONS  (PRN):  acetaminophen   Tablet .. 650 milliGRAM(s) Oral every 6 hours PRN Mild Pain (1 - 3)  oxyCODONE    IR 5 milliGRAM(s) Oral every 4 hours PRN moderate to severe pain (4-10)      T(C): 37 (05-24-19 @ 09:55), Max: 37.1 (05-23-19 @ 14:04)  HR: 82 (05-24-19 @ 09:55) (82 - 96)  BP: 108/70 (05-24-19 @ 09:55) (108/70 - 117/75)  RR: 17 (05-24-19 @ 09:55) (16 - 18)  SpO2: 99% (05-24-19 @ 09:55) (95% - 99%)  CAPILLARY BLOOD GLUCOSE        I&O's Summary    23 May 2019 07:01  -  24 May 2019 07:00  --------------------------------------------------------  IN: 480 mL / OUT: 1206 mL / NET: -726 mL    24 May 2019 07:01  -  24 May 2019 11:41  --------------------------------------------------------  IN: 0 mL / OUT: 350 mL / NET: -350 mL    PHYSICAL EXAM  GENERAL: NAD  EYES: sclera clear  CHEST/LUNG: Clear to auscultation bilaterally; No wheeze or crackles  HEART: s1/s2  ABDOMEN: Soft, Nontender, Nondistended; Bowel sounds present  EXTREMITIES: left ankle with LUI drain with serosanguinous drainage and attached to wound vac, 2+ Peripheral Pulses, No clubbing, or cyanosis, edema in left ankle improved  PSYCH: awake, alert, responds appropriately    LABS:                        11.2   9.50  )-----------( 300      ( 23 May 2019 06:19 )             33.9     05-23    142  |  107  |  22  ----------------------------<  86  4.0   |  25  |  0.70    Ca    9.2      23 May 2019 06:19  Phos  3.1     05-23  Mg     1.9     05-23                RADIOLOGY & ADDITIONAL TESTS:

## 2019-05-24 NOTE — PROGRESS NOTE ADULT - SUBJECTIVE AND OBJECTIVE BOX
f/u left ankle swelling/infection    Patient is a 53y old  Female who presents with a chief complaint of cellulitis    Interval History/ROS:  post-op D#3 s/p L ankle exploration, arthrotomy, I&D and vac placement.   afebrile.  still with VAC.  tolerating ancef.  had an episode of nausea but no vomiting.  possible reflux.  Remainder of ROS otherwise negative.    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, the pressure is not high&quot;  Depression  History of seizure: x1-2007  H/O lymph node biopsy: &quot;mediastinal&quot;-2005  History of appendectomy: as a child  History of tonsillectomy: as a child    Allergies  penicillin (Rash)    ANTIMICROBIALS:    ceFAZolin   IVPB 2000 every 8 hours (anti-mssa tx 5/16-)    MEDICATIONS  (STANDING):  amitriptyline 25 at bedtime  aspirin  chewable 81 daily  levothyroxine 25 daily  QUEtiapine 200 at bedtime    Vital Signs Last 24 Hrs  T(F): 98.6 (05-24-19 @ 09:55), Max: 98.7 (05-23-19 @ 14:04)  HR: 82 (05-24-19 @ 09:55)  BP: 108/70 (05-24-19 @ 09:55)  RR: 17 (05-24-19 @ 09:55)  SpO2: 99% (05-24-19 @ 09:55) (95% - 99%)    PHYSICAL EXAM:  General: non-toxic  HEAD/EYES: anicteric  ENT:  supple  Cardiovascular:   S1, S2  Respiratory:  clear bilaterally  GI:  soft, non-tender, normal bowel sounds  :  no betancur  Musculoskeletal:  swollen L ankle that is post-op and with VAC. ROM is good  Neurologic:  grossly non-focal  Skin:  no rash  Psychiatric: ok  Vascular:  no phlebitis    Sedimentation Rate, Erythrocyte: 23 (05-20-19)  Sedimentation Rate, Erythrocyte: 20 (05-17-19)  Sedimentation Rate, Erythrocyte: 7 (05-13-19)              C-Reactive Protein, Serum: 8.2 (05-20-19)  C-Reactive Protein, Serum: 14.4 (05-13-19)                                   11.2   9.50  )-----------( 300      ( 23 May 2019 06:19 )             33.9 05-23    142  |  107  |  22  ----------------------------<  86  4.0   |  25  |  0.70  Ca    9.2      23 May 2019 06:19Phos  3.1     05-23Mg     1.9     05-23    Surgical Pathology Report:   Final Diagnosis  1. Soft tissue mass, left ankle, excision:   - Fibroconnective tissue with degenerated mucin-like material and focal foreign body type giant cell reaction.  2. Soft tissue mass, left ankle, excision:  - Fibroconnective tissue with foci of necrosis, degenerated mucin-like material, granulomatous inflammation, and focal foreign body type giant cell reaction.  - Negative for Fungal organisms (GMS stain).  - AFB stain to be reported as addendum.  (03.28.19 @ 20:03)    MICROBIOLOGY:  Culture - Surg Site Aerob/Anaer w/Gm St (05.22.19 @ 10:13)    Gram Stain Wound:   NOS^No Organisms Seen  WBC^White Blood Cells  QNTY CELLS IN GRAM STAIN: FEW (2+)    Culture - Surgical Site:   NO GROWTH - PRELIMINARY RESULTS    Specimen Source: ANKLE    Culture - Surg Site Aerob/Anaer w/Gm St (05.22.19 @ 10:09)    Gram Stain Wound:   BLOODY SP.  NOS^No Organisms Seen  WBC^White Blood Cells  QNTY CELLS IN GRAM STAIN: RARE (1+)    Culture - Surgical Site:   NO GROWTH - PRELIMINARY RESULTS    Specimen Source: ANKLE    Culture - Fungal, Blood (05.20.19 @ 15:01)    Culture - Fungal, Blood:   CULTURE NEGATIVE FOR YEASTS AND MOLDS-PRELIMINARY RESULT  AFTER 24 HOURS INCUBATION    Specimen Source: BLOOD    Culture - Blood (05.16.19 @ 23:18)    Culture - Blood:   NO ORGANISMS ISOLATED    Specimen Source: BLOOD VENOUS    Culture - Yeast and Fungus (03.28.19 @ 20:24)  CULTURE NEGATIVE FOR YEASTS AND MOLDS   AFTER 4 WEEKS    Specimen Source: OTHER    Culture - Surg Site Aerob/Anaer w/Gm St (03.28.19 @ 20:24)    Gram Stain Wound:   NOS No Organisms Seen  Culture - Blood (05.16.19 @ 23:18)    Culture - Blood:   NO ORGANISMS ISOLATED    Specimen Source: BLOOD PERIPHERAL    WBC^White Blood Cells  QNTY CELLS IN GRAM STAIN: RARE (1+)    -  Cefazolin: S <=4 JOSE    -  Ciprofloxacin: S <=1 JOSE    -  Clindamycin: S    -  Erythromycin: S    -  Gentamicin: S <=1 JOSE    -  Levofloxacin: S <=0.5 JOSE    -  Moxifloxacin(Aerobic): S <=0.5 JOSE    -  Oxacillin: S <=0.25 JOSE    -  Rifampin: S <=1 JOSE    -  Tetra/Doxy: S <=1 JOSE    -  Trimethoprim/Sulfamethoxazole: S <=0.5/9.5 JOSE    -  Vancomycin: S 1 JOSE    Culture - Surgical Site:   RARE    Specimen Source: OTHER    Organism Identification: Staphylococcus aureus    RADIOLOGY:  imaging below personally reviewed    Xray Ankle 2 Views, Left (05.16.19 @ 21:30) >  IMPRESSION:  Soft tissue swelling of the left ankle. Small joint effusion. No radiographic evidence of osteomyelitis. No acute fracture.    MR Ankle w/ IV Cont, Left (05.17.19 @ 19:55) >  Impression: 5.8 x 2.2x 1.3 cm abscess anterior laterally at the level of the ankle. Large tibiotalar joint effusion with marked synovitis and marrow signal changes within the talus, distal tibia and distal fibula suggestive of septic arthritis/osteomyelitis. Area of nonenhancement within the lateral talus suspicious for ischemia or infarction.    1/27/19 - MRI left ankle - non-specific diffuse masslike synovial thickening of the tibiotalar joint presumably herniating through the syndesmosis and extending towards the anterolateral aspect of the ankle, corresponding to the area of palpable abnormality.  Findings are concerning for a synovial process.  Possibilities include PVNS (pigmented villonodular synovitis), inflammatory arthropathy, gout, and other etiologies.    11/14/17 - MRI left ankle - finding of synovitis with fluid and synovial thickening at the tibiotalar joint. subchondral erosions are seen at the tip of the fibula and at the base of the second metatarsal.  The findings are overall non-specific although could be seen with rheumatoid arthritis.

## 2019-05-25 PROCEDURE — 99233 SBSQ HOSP IP/OBS HIGH 50: CPT

## 2019-05-25 RX ORDER — CHLORHEXIDINE GLUCONATE 213 G/1000ML
1 SOLUTION TOPICAL
Refills: 0 | Status: DISCONTINUED | OUTPATIENT
Start: 2019-05-25 | End: 2019-05-30

## 2019-05-25 RX ADMIN — Medication 25 MICROGRAM(S): at 06:37

## 2019-05-25 RX ADMIN — Medication 100 MILLIGRAM(S): at 22:57

## 2019-05-25 RX ADMIN — CHLORHEXIDINE GLUCONATE 1 APPLICATION(S): 213 SOLUTION TOPICAL at 10:07

## 2019-05-25 RX ADMIN — Medication 100 MILLIGRAM(S): at 06:38

## 2019-05-25 RX ADMIN — Medication 25 MILLIGRAM(S): at 22:57

## 2019-05-25 RX ADMIN — Medication 1 TABLET(S): at 17:37

## 2019-05-25 RX ADMIN — Medication 100 MILLIGRAM(S): at 14:48

## 2019-05-25 RX ADMIN — Medication 1 TABLET(S): at 06:37

## 2019-05-25 RX ADMIN — QUETIAPINE FUMARATE 200 MILLIGRAM(S): 200 TABLET, FILM COATED ORAL at 22:57

## 2019-05-25 NOTE — PROGRESS NOTE ADULT - SUBJECTIVE AND OBJECTIVE BOX
Patient is a 53y old  Female who presents with a chief complaint of ankle swelling (25 May 2019 04:40)      SUBJECTIVE / OVERNIGHT EVENTS: No acute events overnight. No new complaints. No N/V, ankle pain is tolerable. Anxious about leaving with a wound vac and that antibiotics will not be at her home at time of DC.    MEDICATIONS  (STANDING):  amitriptyline 25 milliGRAM(s) Oral at bedtime  aspirin  chewable 81 milliGRAM(s) Oral daily  ceFAZolin   IVPB      ceFAZolin   IVPB 2000 milliGRAM(s) IV Intermittent every 8 hours  chlorhexidine 4% Liquid 1 Application(s) Topical <User Schedule>  lactobacillus acidophilus 1 Tablet(s) Oral two times a day  levothyroxine 25 MICROGram(s) Oral daily  QUEtiapine 200 milliGRAM(s) Oral at bedtime    MEDICATIONS  (PRN):  acetaminophen   Tablet .. 650 milliGRAM(s) Oral every 6 hours PRN Mild Pain (1 - 3)  oxyCODONE    IR 5 milliGRAM(s) Oral every 4 hours PRN moderate to severe pain (4-10)      T(C): 36.3 (05-25-19 @ 06:36), Max: 37.1 (05-24-19 @ 21:20)  HR: 85 (05-25-19 @ 06:36) (85 - 110)  BP: 98/58 (05-25-19 @ 06:36) (98/58 - 104/62)  RR: 16 (05-25-19 @ 06:36) (16 - 20)  SpO2: 98% (05-25-19 @ 06:36) (95% - 98%)  CAPILLARY BLOOD GLUCOSE        I&O's Summary    24 May 2019 07:01  -  25 May 2019 07:00  --------------------------------------------------------  IN: 170 mL / OUT: 1005 mL / NET: -835 mL        PHYSICAL EXAM:  GENERAL: NAD  EYES: sclera clear  CHEST/LUNG: Clear to auscultation bilaterally; No wheeze or crackles  HEART: s1/s2  ABDOMEN: Soft, Nontender, Nondistended  EXTREMITIES: left ankle with LUI drain with serosanguinous drainage and attached to wound vac, 2+ Peripheral Pulses, No clubbing, or cyanosis, edema in left ankle improved  LINES: left arm midline    LABS:                    RADIOLOGY & ADDITIONAL TESTS:

## 2019-05-25 NOTE — PROGRESS NOTE ADULT - SUBJECTIVE AND OBJECTIVE BOX
Ortho Progress Note    S: Patient seen and examined. No acute events overnight. Pain well controlled with current regimen. Tolerating diet.     O:  Physical Exam:  Gen: Laying in bed, NAD, alert and oriented.   Resp: Unlabored breathing  Ext: EHL/FHL/TA/Sol intact          + SILT DP/SP/OG/Sa          +DP, extremity WWP  Wound vac functional, holding suction     Vital Signs Last 24 Hrs  T(C): 36.4 (25 May 2019 01:56), Max: 37.1 (24 May 2019 21:20)  T(F): 97.6 (25 May 2019 01:56), Max: 98.7 (24 May 2019 21:20)  HR: 91 (25 May 2019 01:56) (82 - 110)  BP: 99/57 (25 May 2019 01:56) (99/57 - 110/58)  BP(mean): --  RR: 18 (25 May 2019 01:56) (16 - 20)  SpO2: 95% (25 May 2019 01:56) (95% - 99%)                            11.2   9.50  )-----------( 300      ( 23 May 2019 06:19 )             33.9       05-23    142  |  107  |  22  ----------------------------<  86  4.0   |  25  |  0.70    Ca    9.2      23 May 2019 06:19  Phos  3.1     05-23  Mg     1.9     05-23

## 2019-05-25 NOTE — PROGRESS NOTE ADULT - PROBLEM SELECTOR PLAN 1
MRI left ankle with 5.8 x2.2 x1.3cm abscess s/p left ankle exploration, arthrotomy, I&D and vac placement on 5/21, Per ortho, new vac needs to be placed 5/27, then keep on for additional 3 days, wound care ordered. Patient needs to followup in Dr. Cain's office  Ancef 2g IV q8H through 6/12/19 per ID, midline placed 5/24  On ASA for DVT ppx per ortho, PT eval: home PT  OR cultures so far negative, no bone involvement, Bcx, fungal cx NGTD

## 2019-05-26 PROCEDURE — 99233 SBSQ HOSP IP/OBS HIGH 50: CPT

## 2019-05-26 RX ADMIN — Medication 81 MILLIGRAM(S): at 14:29

## 2019-05-26 RX ADMIN — Medication 1 TABLET(S): at 18:29

## 2019-05-26 RX ADMIN — Medication 100 MILLIGRAM(S): at 06:28

## 2019-05-26 RX ADMIN — Medication 100 MILLIGRAM(S): at 22:44

## 2019-05-26 RX ADMIN — CHLORHEXIDINE GLUCONATE 1 APPLICATION(S): 213 SOLUTION TOPICAL at 14:29

## 2019-05-26 RX ADMIN — Medication 100 MILLIGRAM(S): at 14:29

## 2019-05-26 RX ADMIN — Medication 25 MILLIGRAM(S): at 22:44

## 2019-05-26 RX ADMIN — Medication 1 TABLET(S): at 06:28

## 2019-05-26 RX ADMIN — QUETIAPINE FUMARATE 200 MILLIGRAM(S): 200 TABLET, FILM COATED ORAL at 22:44

## 2019-05-26 RX ADMIN — Medication 25 MICROGRAM(S): at 06:28

## 2019-05-26 NOTE — PROGRESS NOTE ADULT - SUBJECTIVE AND OBJECTIVE BOX
Patient is a 54y old  Female who presents with a chief complaint of ankle swelling (25 May 2019 09:58)      SUBJECTIVE / OVERNIGHT EVENTS: No acute events overnight. No N/V/D. SBP 80s overnight but patient was asymptomatic and improved this morning without intervention to SBP 100s    MEDICATIONS  (STANDING):  amitriptyline 25 milliGRAM(s) Oral at bedtime  aspirin  chewable 81 milliGRAM(s) Oral daily  ceFAZolin   IVPB      ceFAZolin   IVPB 2000 milliGRAM(s) IV Intermittent every 8 hours  chlorhexidine 4% Liquid 1 Application(s) Topical <User Schedule>  lactobacillus acidophilus 1 Tablet(s) Oral two times a day  levothyroxine 25 MICROGram(s) Oral daily  QUEtiapine 200 milliGRAM(s) Oral at bedtime    MEDICATIONS  (PRN):  acetaminophen   Tablet .. 650 milliGRAM(s) Oral every 6 hours PRN Mild Pain (1 - 3)  oxyCODONE    IR 5 milliGRAM(s) Oral every 4 hours PRN moderate to severe pain (4-10)      T(C): 36.4 (05-26-19 @ 06:26), Max: 36.9 (05-25-19 @ 14:06)  HR: 94 (05-26-19 @ 06:26) (86 - 100)  BP: 100/52 (05-26-19 @ 07:36) (87/56 - 120/-)  RR: 18 (05-26-19 @ 06:26) (18 - 18)  SpO2: 96% (05-26-19 @ 06:26) (96% - 100%)  CAPILLARY BLOOD GLUCOSE        I&O's Summary    25 May 2019 07:01  -  26 May 2019 07:00  --------------------------------------------------------  IN: 580 mL / OUT: 1229.5 mL / NET: -649.5 mL        PHYSICAL EXAM:  GENERAL: NAD  EYES: sclera clear  CHEST/LUNG: Clear to auscultation bilaterally; No wheeze or crackles  HEART: s1/s2  ABDOMEN: Soft, Nontender, Nondistended  EXTREMITIES: left ankle with LUI drain with serosanguinous drainage and attached to wound vac, edema in left ankle improved  LINES: left arm midline C/D/I    LABS:                    RADIOLOGY & ADDITIONAL TESTS:

## 2019-05-26 NOTE — PROVIDER CONTACT NOTE (OTHER) - BACKGROUND
Pt admitted for osteomyelitis. PMH of hypothyroidism, history of palpitations, gastritis, arthritis, cholelithiases, fatty liver, hodgkins lymphoma, CVA, eye disorder, depression, history of seizure.

## 2019-05-27 ENCOUNTER — TRANSCRIPTION ENCOUNTER (OUTPATIENT)
Age: 54
End: 2019-05-27

## 2019-05-27 LAB
CULTURE - SURGICAL SITE: SIGNIFICANT CHANGE UP
CULTURE - SURGICAL SITE: SIGNIFICANT CHANGE UP
SPECIMEN SOURCE: SIGNIFICANT CHANGE UP

## 2019-05-27 PROCEDURE — 99233 SBSQ HOSP IP/OBS HIGH 50: CPT

## 2019-05-27 RX ORDER — SODIUM CHLORIDE 9 MG/ML
500 INJECTION INTRAMUSCULAR; INTRAVENOUS; SUBCUTANEOUS ONCE
Refills: 0 | Status: COMPLETED | OUTPATIENT
Start: 2019-05-27 | End: 2019-05-27

## 2019-05-27 RX ADMIN — Medication 1 TABLET(S): at 06:03

## 2019-05-27 RX ADMIN — QUETIAPINE FUMARATE 200 MILLIGRAM(S): 200 TABLET, FILM COATED ORAL at 21:37

## 2019-05-27 RX ADMIN — Medication 25 MILLIGRAM(S): at 21:37

## 2019-05-27 RX ADMIN — Medication 100 MILLIGRAM(S): at 14:47

## 2019-05-27 RX ADMIN — SODIUM CHLORIDE 500 MILLILITER(S): 9 INJECTION INTRAMUSCULAR; INTRAVENOUS; SUBCUTANEOUS at 06:24

## 2019-05-27 RX ADMIN — Medication 1 TABLET(S): at 18:47

## 2019-05-27 RX ADMIN — Medication 100 MILLIGRAM(S): at 06:03

## 2019-05-27 RX ADMIN — Medication 25 MICROGRAM(S): at 06:03

## 2019-05-27 RX ADMIN — Medication 100 MILLIGRAM(S): at 21:37

## 2019-05-27 RX ADMIN — CHLORHEXIDINE GLUCONATE 1 APPLICATION(S): 213 SOLUTION TOPICAL at 14:47

## 2019-05-27 NOTE — CHART NOTE - NSCHARTNOTEFT_GEN_A_CORE
left ankle noted to have increased swelling since earlier today, pt w/ hx  joint with soft tissue abscess, s/p I&D by ortho, LUI removed earlier today, and Vac changed. D/w ortho rec to continue elevation and to apply ice pack, will f/u with pt later today or early in AM. will continue to monitor.     ADSNP [id number] left ankle noted to have increased swelling since earlier today, pt w/ hx  joint with soft tissue abscess, s/p I&D by ortho, LUI removed earlier today, and Vac changed. D/w ortho rec to continue elevation and to apply ice pack, will f/u with pt later today or early in AM. will continue to monitor.   Dwight as per ortho team, pt may need to be dc with Vac on Wednesday, Vac form to be completed by ortho resident, who is aware that form needs to be signed by ortho team. Measurements were filled out by wound are RN    ADSNP [id number]

## 2019-05-27 NOTE — DISCHARGE NOTE NURSING/CASE MANAGEMENT/SOCIAL WORK - NSDCPEPTSTRK_GEN_ALL_CORE
Risk factors for stroke/Stroke education booklet/Call 911 for stroke/Prescribed medications/Need for follow up after discharge/Stroke support groups for patients, families, and friends/Stroke warning signs and symptoms/Signs and symptoms of stroke

## 2019-05-27 NOTE — DISCHARGE NOTE NURSING/CASE MANAGEMENT/SOCIAL WORK - NSDCDPATPORTLINK_GEN_ALL_CORE
You can access the [facility] Portal, offered by [facility], by registering with the following website: [email or web address]

## 2019-05-27 NOTE — PROGRESS NOTE ADULT - SUBJECTIVE AND OBJECTIVE BOX
Patient is a 54y old  Female who presents with a chief complaint of ankle swelling (27 May 2019 11:10)      SUBJECTIVE / OVERNIGHT EVENTS: No acute events overnight. Hypotensive to SBP 82/58 overnight s/p 500cc bolus with improvement to 111/68. Patient denies chest pain, SOB, dizziness.    MEDICATIONS  (STANDING):  amitriptyline 25 milliGRAM(s) Oral at bedtime  aspirin  chewable 81 milliGRAM(s) Oral daily  ceFAZolin   IVPB      ceFAZolin   IVPB 2000 milliGRAM(s) IV Intermittent every 8 hours  chlorhexidine 4% Liquid 1 Application(s) Topical <User Schedule>  lactobacillus acidophilus 1 Tablet(s) Oral two times a day  levothyroxine 25 MICROGram(s) Oral daily  QUEtiapine 200 milliGRAM(s) Oral at bedtime    MEDICATIONS  (PRN):  acetaminophen   Tablet .. 650 milliGRAM(s) Oral every 6 hours PRN Mild Pain (1 - 3)  oxyCODONE    IR 5 milliGRAM(s) Oral every 4 hours PRN moderate to severe pain (4-10)      T(C): 36.5 (05-27-19 @ 10:08), Max: 37.2 (05-26-19 @ 21:10)  HR: 103 (05-27-19 @ 10:08) (87 - 109)  BP: 107/69 (05-27-19 @ 10:08) (82/58 - 130/110)  RR: 18 (05-27-19 @ 10:08) (18 - 18)  SpO2: 90% (05-27-19 @ 10:08) (90% - 100%)  CAPILLARY BLOOD GLUCOSE        I&O's Summary    26 May 2019 07:01  -  27 May 2019 07:00  --------------------------------------------------------  IN: 300 mL / OUT: 522.5 mL / NET: -222.5 mL        PHYSICAL EXAM:  GENERAL: NAD  EYES: sclera clear  CHEST/LUNG: Clear to auscultation bilaterally; No wheeze or crackles  HEART: s1/s2  ABDOMEN: Soft, Nontender, Nondistended  EXTREMITIES: left ankle with LUI drain with serosanguinous drainage and attached to wound vac, edema in left ankle improved  LINES: left arm midline C/D/I    LABS:                    RADIOLOGY & ADDITIONAL TESTS:

## 2019-05-27 NOTE — DISCHARGE NOTE NURSING/CASE MANAGEMENT/SOCIAL WORK - NSSCNAMETXT_GEN_ALL_CORE
[facility] at [place] [phone]  the nurse will visit a day after discharge. The nurse will call prior to visit. [facility] [place] [phone]  the nurse will visit a day after discharge.   [place] [phone], Initial RN visit on day of discharge for 2nd dose of IV antibiotics

## 2019-05-27 NOTE — PROGRESS NOTE ADULT - SUBJECTIVE AND OBJECTIVE BOX
patient with septic infected left ankle joint with soft tissue abscess. Few days ago underwent extensive debridement irrigation with insertion of VAC and LUI suction tube , responded quite good swelling went down. VAC was removed surgical wound look good, at this stage patient was recommended for VAC exchange for more few days.

## 2019-05-27 NOTE — PROVIDER CONTACT NOTE (OTHER) - ACTION/TREATMENT ORDERED:
1/2 Bolus of normal saline.
Hold Lactobacillus acidophilus tablet. Give Synthroid with small sips of water.
No orders at this time.
1L bolus ordered and recheck bp after bolus given
ADS notified. No new orders. Will continue to monitor

## 2019-05-27 NOTE — PROVIDER CONTACT NOTE (OTHER) - ASSESSMENT
BP 99/57. Patient asymptomatic.
Patient BP: 82/58. Patient resting comfortably In bed. No complaints of dizziness/lightheaded.
Pt AOx4, wound vac and LUI drain on left ankle, OOB self (non-weightbearing to left foot), BP 87/56, HR 94, 96% on RA, and temp 97.6
Pt states she is tired. denies c/p,s ob, n/v, dizziness, lightheadedness

## 2019-05-27 NOTE — PROGRESS NOTE ADULT - SUBJECTIVE AND OBJECTIVE BOX
Orthopedic Surgery Progress Note  S: Pain is well controlled.  No nausea, vomiting, chest pain, shortness of breath, lightheadedness, or dizziness. Patient comfortable and sleeping when entered room.    O:  Vital Signs Last 24 Hrs  T(C): 36.6 (27 May 2019 01:56), Max: 37.2 (26 May 2019 21:10)  T(F): 97.9 (27 May 2019 01:56), Max: 99 (26 May 2019 21:10)  HR: 97 (27 May 2019 01:56) (89 - 109)  BP: 130/110 (27 May 2019 01:56) (87/56 - 130/110)  RR: 18 (27 May 2019 01:56) (18 - 18)  SpO2: 95% (27 May 2019 01:56) (95% - 100%)    O:  Physical Exam:  Gen: Laying in bed, NAD, alert and oriented.   Resp: Unlabored breathing  Ext: EHL/FHL/TA/Sol intact          + SILT DP/SP/OG/Sa          +DP, extremity WWP  Wound vac functional, holding suction   LUI in place with SS output    A/P: 53yoF s/p L ankle arthrotomy, I&D, wound vac placement 5/21    Neuro: Pain control  Resp: IS  GI: Regular diet, bowel reg  MSK: NWB LLE, PT/OT  Heme: DVT PPX  FU LUI output (remove when WV is changed today)  FU WV output, vac to be changed today and then continued for 3 more days  ID: abx plan per ID: ancef  FU OR Cx and path  dispo planning: possible DC home today after vac change    Merritt Mendez MD

## 2019-05-27 NOTE — DISCHARGE NOTE NURSING/CASE MANAGEMENT/SOCIAL WORK - NSDCFUADDAPPT_GEN_ALL_CORE_FT
**You are scheduled to see Dr Herman on 6/13/2019 at 10:15 AM (enterance 5). call to confirm your appt   Infectious Disease; Internal Medicine  [address] [place] [id number]  Phone: [phone]

## 2019-05-28 PROCEDURE — 99233 SBSQ HOSP IP/OBS HIGH 50: CPT

## 2019-05-28 RX ADMIN — CHLORHEXIDINE GLUCONATE 1 APPLICATION(S): 213 SOLUTION TOPICAL at 13:57

## 2019-05-28 RX ADMIN — Medication 1 TABLET(S): at 18:07

## 2019-05-28 RX ADMIN — Medication 25 MICROGRAM(S): at 05:44

## 2019-05-28 RX ADMIN — Medication 1 TABLET(S): at 05:44

## 2019-05-28 RX ADMIN — Medication 100 MILLIGRAM(S): at 05:44

## 2019-05-28 RX ADMIN — Medication 100 MILLIGRAM(S): at 22:42

## 2019-05-28 RX ADMIN — Medication 100 MILLIGRAM(S): at 13:57

## 2019-05-28 RX ADMIN — Medication 25 MILLIGRAM(S): at 22:42

## 2019-05-28 RX ADMIN — QUETIAPINE FUMARATE 200 MILLIGRAM(S): 200 TABLET, FILM COATED ORAL at 22:42

## 2019-05-28 NOTE — PROGRESS NOTE ADULT - SUBJECTIVE AND OBJECTIVE BOX
Patient is a 54y old  Female who presents with a chief complaint of ankle swelling (28 May 2019 12:15)      SUBJECTIVE / OVERNIGHT EVENTS:    No acute event o/n. Pt reports her L ankle appears to slightly more swollen than the day before. still unable to bear weight     Review of Systems:    RESPIRATORY: No cough, wheezing, chills or hemoptysis; No shortness of breath  CARDIOVASCULAR: No chest pain, palpitations, dizziness, or leg swelling  GASTROINTESTINAL: No abdominal or epigastric pain. No nausea, vomiting, or hematemesis; No diarrhea or constipation. No melena or hematochezia.      MEDICATIONS  (STANDING):  amitriptyline 25 milliGRAM(s) Oral at bedtime  aspirin  chewable 81 milliGRAM(s) Oral daily  ceFAZolin   IVPB      ceFAZolin   IVPB 2000 milliGRAM(s) IV Intermittent every 8 hours  chlorhexidine 4% Liquid 1 Application(s) Topical <User Schedule>  lactobacillus acidophilus 1 Tablet(s) Oral two times a day  levothyroxine 25 MICROGram(s) Oral daily  QUEtiapine 200 milliGRAM(s) Oral at bedtime    MEDICATIONS  (PRN):  acetaminophen   Tablet .. 650 milliGRAM(s) Oral every 6 hours PRN Mild Pain (1 - 3)  oxyCODONE    IR 5 milliGRAM(s) Oral every 4 hours PRN moderate to severe pain (4-10)      PHYSICAL EXAM:  T(C): 36.6 (05-28-19 @ 13:56), Max: 37 (05-27-19 @ 22:08)  HR: 87 (05-28-19 @ 13:56) (51 - 91)  BP: 107/61 (05-28-19 @ 13:56) (104/64 - 117/69)  RR: 18 (05-28-19 @ 13:56) (17 - 18)  SpO2: 98% (05-28-19 @ 13:56) (97% - 100%)  I&O's Summary    27 May 2019 07:01  -  28 May 2019 07:00  --------------------------------------------------------  IN: 200 mL / OUT: 450 mL / NET: -250 mL    28 May 2019 07:01  -  28 May 2019 14:47  --------------------------------------------------------  IN: 0 mL / OUT: 700 mL / NET: -700 mL      GENERAL: middle female lying in bed in NAD   MENTAL STATUS/PSYCH:  AAO x3   HEAD:  Atraumatic, Normocephalic  EYES: EOMI, PERRLA, conjunctiva and sclera clear  NECK: Supple, No elevated JVD  CHEST/LUNG: Clear to auscultation bilaterally; No wheeze  HEART: Regular rate and rhythm; No murmurs, rubs, or gallops  ABDOMEN: Soft, Nontender, Nondistended; Bowel sounds present  EXTREMITIES:  L ankle with wound vac in place. mild swelling and erythema L foot   NEUROLOGY: CN II-XII grossly intact, moving all extremities  SKIN: No rashes or lesions    LABS:  CAPILLARY BLOOD GLUCOSE

## 2019-05-28 NOTE — PROGRESS NOTE ADULT - SUBJECTIVE AND OBJECTIVE BOX
Orthopedic Surgery Progress Note  S: Pain is well controlled.  No nausea, vomiting, chest pain, shortness of breath, lightheadedness, or dizziness. Patient comfortable and sleeping when entered room.    Patient seen by Dr. Cain yesterday, LUI was removed and wound examined demonstrating progress in healing. He would like the patient to remain in house until at least 5/30 with wound vac treatment.     O:  Vital Signs Last 24 Hrs  T(C): 36.4 (28 May 2019 05:43), Max: 37 (27 May 2019 22:08)  T(F): 97.5 (28 May 2019 05:43), Max: 98.6 (27 May 2019 22:08)  HR: 87 (28 May 2019 05:43) (87 - 103)  BP: 105/57 (28 May 2019 05:43) (102/56 - 115/75)  BP(mean): --  RR: 18 (28 May 2019 05:43) (17 - 18)  SpO2: 100% (28 May 2019 05:43) (90% - 100%)    Physical Exam:  Gen: Laying in bed, NAD, alert and oriented.   Resp: Unlabored breathing  Ext: EHL/FHL/TA/Sol intact          + SILT DP/SP/OG/Sa          +DP, extremity WWP  Wound vac functional, holding suction   LUI in place with SS output    A/P: 53yoF s/p L ankle arthrotomy, I&D, wound vac placement 5/21    Neuro: Pain control  Resp: IS  GI: Regular diet, bowel reg  MSK: NWB LLE, PT/OT  Heme: DVT PPX  Continue wound vac therapy for at least until 5/30  ID: abx plan per ID: ancef  FU OR Cx and path  dispo planning: Home

## 2019-05-28 NOTE — PROGRESS NOTE ADULT - SUBJECTIVE AND OBJECTIVE BOX
f/u left ankle swelling/infection    Patient is a 53y old  Female who presents with a chief complaint of cellulitis    Interval History/ROS:  post-op D#7 s/p L ankle exploration, arthrotomy, I&D and vac placement.   no fever.  LUI out.  still with VAC.  complains of increased swelling of the left foot since removal of the LUI drain.  no n/v/d.  no dysuria.  PICC okay without issues.  Remainder of ROS otherwise negative.    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, the pressure is not high&quot;  Depression  History of seizure: x1-2007  H/O lymph node biopsy: &quot;mediastinal&quot;-2005  History of appendectomy: as a child  History of tonsillectomy: as a child    Allergies  penicillin (Rash)    ANTIMICROBIALS:    ceFAZolin   IVPB 2000 every 8 hours (5/16-)    MEDICATIONS  (STANDING):  amitriptyline 25 at bedtime  aspirin  chewable 81 daily  levothyroxine 25 daily  QUEtiapine 200 at bedtime    Vital Signs Last 24 Hrs  T(F): 98 (05-28-19 @ 10:05), Max: 98.6 (05-27-19 @ 22:08)  HR: 51 (05-28-19 @ 10:05)  BP: 117/69 (05-28-19 @ 10:05)  RR: 18 (05-28-19 @ 10:05)  SpO2: 99% (05-28-19 @ 10:05) (95% - 100%)    PHYSICAL EXAM:  General: non-toxic  HEAD/EYES: anicteric  ENT:  supple  Cardiovascular:   S1, S2  Respiratory:  clear bilaterally  GI:  soft, non-tender, normal bowel sounds  :  no betancur  Musculoskeletal:  swollen L ankle that is post-op and with VAC. LUI out  Neurologic:  grossly non-focal  Skin:  no rash  Psychiatric: ok  Vascular:  L PICC c/d/i    Sedimentation Rate, Erythrocyte: 23 (05-20-19)  Sedimentation Rate, Erythrocyte: 20 (05-17-19)  Sedimentation Rate, Erythrocyte: 7 (05-13-19)              C-Reactive Protein, Serum: 8.2 (05-20-19)  C-Reactive Protein, Serum: 14.4 (05-13-19)             Surgical Pathology Report:   Final Diagnosis  1. Soft tissue mass, left ankle, excision:   - Fibroconnective tissue with degenerated mucin-like material and focal foreign body type giant cell reaction.  2. Soft tissue mass, left ankle, excision:  - Fibroconnective tissue with foci of necrosis, degenerated mucin-like material, granulomatous inflammation, and focal foreign body type giant cell reaction.  - Negative for Fungal organisms (GMS stain).  - AFB stain to be reported as addendum.  (03.28.19 @ 20:03)    MICROBIOLOGY:  Culture - Surg Site Aerob/Anaer w/Gm St (05.22.19 @ 10:13)    Gram Stain Wound:   NOS^No Organisms Seen  WBC^White Blood Cells  QNTY CELLS IN GRAM STAIN: FEW (2+)    Culture - Surgical Site:   NO GROWTH - PRELIMINARY RESULTS    Specimen Source: ANKLE    Culture - Surg Site Aerob/Anaer w/Gm St (05.22.19 @ 10:09)    Gram Stain Wound:   BLOODY SP.  NOS^No Organisms Seen  WBC^White Blood Cells  QNTY CELLS IN GRAM STAIN: RARE (1+)    Culture - Surgical Site:   NO GROWTH - PRELIMINARY RESULTS    Specimen Source: ANKLE    Culture - Fungal, Blood (05.20.19 @ 15:01)    Culture - Fungal, Blood:   CULTURE NEGATIVE FOR YEASTS AND MOLDS-PRELIMINARY RESULT  AFTER 24 HOURS INCUBATION    Specimen Source: BLOOD    Culture - Blood (05.16.19 @ 23:18)    Culture - Blood:   NO ORGANISMS ISOLATED    Specimen Source: BLOOD VENOUS    Culture - Yeast and Fungus (03.28.19 @ 20:24)  CULTURE NEGATIVE FOR YEASTS AND MOLDS   AFTER 4 WEEKS    Specimen Source: OTHER    Culture - Surg Site Aerob/Anaer w/Gm St (03.28.19 @ 20:24)    Gram Stain Wound:   NOS No Organisms Seen  Culture - Blood (05.16.19 @ 23:18)    Culture - Blood:   NO ORGANISMS ISOLATED    Specimen Source: BLOOD PERIPHERAL    WBC^White Blood Cells  QNTY CELLS IN GRAM STAIN: RARE (1+)    -  Cefazolin: S <=4 JOSE    -  Ciprofloxacin: S <=1 JOSE    -  Clindamycin: S    -  Erythromycin: S    -  Gentamicin: S <=1 JOSE    -  Levofloxacin: S <=0.5 JOSE    -  Moxifloxacin(Aerobic): S <=0.5 JOSE    -  Oxacillin: S <=0.25 JOSE    -  Rifampin: S <=1 JOSE    -  Tetra/Doxy: S <=1 JOSE    -  Trimethoprim/Sulfamethoxazole: S <=0.5/9.5 JOSE    -  Vancomycin: S 1 JOSE    Culture - Surgical Site:   RARE    Specimen Source: OTHER    Organism Identification: Staphylococcus aureus    RADIOLOGY:  imaging below personally reviewed    Xray Ankle 2 Views, Left (05.16.19 @ 21:30) >  IMPRESSION:  Soft tissue swelling of the left ankle. Small joint effusion. No radiographic evidence of osteomyelitis. No acute fracture.    MR Ankle w/ IV Cont, Left (05.17.19 @ 19:55) >  Impression: 5.8 x 2.2x 1.3 cm abscess anterior laterally at the level of the ankle. Large tibiotalar joint effusion with marked synovitis and marrow signal changes within the talus, distal tibia and distal fibula suggestive of septic arthritis/osteomyelitis. Area of nonenhancement within the lateral talus suspicious for ischemia or infarction.    1/27/19 - MRI left ankle - non-specific diffuse masslike synovial thickening of the tibiotalar joint presumably herniating through the syndesmosis and extending towards the anterolateral aspect of the ankle, corresponding to the area of palpable abnormality.  Findings are concerning for a synovial process.  Possibilities include PVNS (pigmented villonodular synovitis), inflammatory arthropathy, gout, and other etiologies.    11/14/17 - MRI left ankle - finding of synovitis with fluid and synovial thickening at the tibiotalar joint. subchondral erosions are seen at the tip of the fibula and at the base of the second metatarsal.  The findings are overall non-specific although could be seen with rheumatoid arthritis.

## 2019-05-29 PROCEDURE — 99233 SBSQ HOSP IP/OBS HIGH 50: CPT

## 2019-05-29 PROCEDURE — 99232 SBSQ HOSP IP/OBS MODERATE 35: CPT

## 2019-05-29 RX ADMIN — QUETIAPINE FUMARATE 200 MILLIGRAM(S): 200 TABLET, FILM COATED ORAL at 22:18

## 2019-05-29 RX ADMIN — Medication 1 TABLET(S): at 19:07

## 2019-05-29 RX ADMIN — Medication 25 MILLIGRAM(S): at 22:18

## 2019-05-29 RX ADMIN — Medication 100 MILLIGRAM(S): at 05:46

## 2019-05-29 RX ADMIN — Medication 100 MILLIGRAM(S): at 14:12

## 2019-05-29 RX ADMIN — Medication 100 MILLIGRAM(S): at 22:18

## 2019-05-29 RX ADMIN — Medication 1 TABLET(S): at 05:46

## 2019-05-29 RX ADMIN — Medication 25 MICROGRAM(S): at 05:46

## 2019-05-29 RX ADMIN — CHLORHEXIDINE GLUCONATE 1 APPLICATION(S): 213 SOLUTION TOPICAL at 12:22

## 2019-05-29 NOTE — DIETITIAN INITIAL EVALUATION ADULT. - OTHER INFO
pt seen for LOS, 55 Y/O female admitted with the DX of osteomyelitis, cholelithiasis, fatty liver, depression, reported to have good po and  appetite with no N/V/D or  significant weight changes   at this time. Current weight  64 kg, BMI 23, labs reviewed. recommend to add low fat to current diet. Pt refused written information. RD remains available, pt made aware.

## 2019-05-29 NOTE — PROGRESS NOTE ADULT - PROBLEM SELECTOR PLAN 1
MRI left ankle with 5.8 x2.2 x1.3cm abscess s/p left ankle exploration, arthrotomy, I&D and vac placement on 5/21, Per ortho, new vac needs to be placed 5/27, then keep on till 5/30, wound care ordered. Patient needs to followup in Dr. Cain's office  Ancef 2g IV q8H through 6/12/19 per ID, midline placed 5/24  On ASA for DVT ppx per ortho, PT eval: home PT  OR cultures so far negative, no bone involvement, Bcx, fungal cx NGTD

## 2019-05-29 NOTE — DIETITIAN INITIAL EVALUATION ADULT. - PROBLEM SELECTOR PLAN 1
-recurrent infection after failing repeat trial of PO abx, will c/w IV clindamycin, likely will require prolonged course of abx given severity of infection  -ortho and ID followingm, appreciate their recs   -d/w ID in am if need to change coverage of abx, however patient has remained afebrile and with no leukocytosis since admission   -d/w ortho in am regarding drainage of ankle  -tylenol and oxycodone prn for pain control  -LLE duplex neg for dVT

## 2019-05-29 NOTE — PROGRESS NOTE ADULT - SUBJECTIVE AND OBJECTIVE BOX
Patient is a 54y old  Female who presents with a chief complaint of ankle swelling (29 May 2019 11:27)      SUBJECTIVE / OVERNIGHT EVENTS:    No acute event. No new complaint     Review of Systems:    RESPIRATORY: No cough, wheezing, chills or hemoptysis; No shortness of breath  CARDIOVASCULAR: No chest pain, palpitations, dizziness, or leg swelling  GASTROINTESTINAL: No abdominal or epigastric pain. No nausea, vomiting, or hematemesis; No diarrhea or constipation. No melena or hematochezia.      MEDICATIONS  (STANDING):  amitriptyline 25 milliGRAM(s) Oral at bedtime  aspirin  chewable 81 milliGRAM(s) Oral daily  ceFAZolin   IVPB      ceFAZolin   IVPB 2000 milliGRAM(s) IV Intermittent every 8 hours  chlorhexidine 4% Liquid 1 Application(s) Topical <User Schedule>  lactobacillus acidophilus 1 Tablet(s) Oral two times a day  levothyroxine 25 MICROGram(s) Oral daily  QUEtiapine 200 milliGRAM(s) Oral at bedtime    MEDICATIONS  (PRN):  acetaminophen   Tablet .. 650 milliGRAM(s) Oral every 6 hours PRN Mild Pain (1 - 3)  oxyCODONE    IR 5 milliGRAM(s) Oral every 4 hours PRN moderate to severe pain (4-10)      PHYSICAL EXAM:  T(C): 36.7 (05-29-19 @ 10:25), Max: 36.8 (05-28-19 @ 17:22)  HR: 79 (05-29-19 @ 10:25) (79 - 96)  BP: 125/72 (05-29-19 @ 10:25) (93/71 - 129/74)  RR: 18 (05-29-19 @ 10:25) (17 - 18)  SpO2: 99% (05-29-19 @ 10:25) (97% - 100%)  I&O's Summary    28 May 2019 07:01  -  29 May 2019 07:00  --------------------------------------------------------  IN: 0 mL / OUT: 1600 mL / NET: -1600 mL    GENERAL: middle female lying in bed in NAD   MENTAL STATUS/PSYCH:  AAO x3   HEAD:  Atraumatic, Normocephalic  EYES: EOMI, PERRLA, conjunctiva and sclera clear  NECK: Supple, No elevated JVD  CHEST/LUNG: Clear to auscultation bilaterally; No wheeze  HEART: Regular rate and rhythm; No murmurs, rubs, or gallops  ABDOMEN: Soft, Nontender, Nondistended; Bowel sounds present  EXTREMITIES:  L ankle with wound vac in place. mild swelling and erythema L foot   NEUROLOGY: CN II-XII grossly intact, moving all extremities  SKIN: No rashes or lesions      LABS:  CAPILLARY BLOOD GLUCOSE

## 2019-05-29 NOTE — DIETITIAN INITIAL EVALUATION ADULT. - PROBLEM SELECTOR PLAN 3
-discussed ASA and statin with patient and declines to take medication, patient will discuss with her pmd

## 2019-05-29 NOTE — PROGRESS NOTE ADULT - SUBJECTIVE AND OBJECTIVE BOX
Orthopedic Surgery Progress Note  S: Pain is well controlled.  No nausea, vomiting, chest pain, shortness of breath, lightheadedness, or dizziness. Patient comfortable and sleeping when entered room.     O:  Vital Signs Last 24 Hrs  T(C): 36.6 (29 May 2019 05:43), Max: 36.8 (28 May 2019 17:22)  T(F): 97.8 (29 May 2019 05:43), Max: 98.2 (28 May 2019 17:22)  HR: 85 (29 May 2019 05:43) (51 - 96)  BP: 93/71 (29 May 2019 05:43) (93/71 - 129/74)  BP(mean): --  RR: 18 (29 May 2019 05:43) (17 - 18)  SpO2: 99% (29 May 2019 05:43) (97% - 100%)    Physical Exam:  Gen: Laying in bed, NAD, alert and oriented.   Resp: Unlabored breathing  Ext: EHL/FHL/TA/Sol intact          + SILT DP/SP/OG/Sa          +DP, extremity WWP  Wound vac functional, holding suction   LUI in place with SS output    A/P: 53yoF s/p L ankle arthrotomy, I&D, wound vac placement 5/21    Neuro: Pain control  Resp: IS  GI: Regular diet, bowel reg  MSK: NWB LLE, PT/OT  Heme: DVT PPX  Continue wound vac therapy for at least until 5/30, WV change today  ID: abx plan per ID: ancef  FU OR Cx and path  dispo planning: Home

## 2019-05-29 NOTE — PROGRESS NOTE ADULT - SUBJECTIVE AND OBJECTIVE BOX
f/u left ankle swelling/infection    Patient is a 53y old  Female who presents with a chief complaint of cellulitis    Interval History/ROS:  post-op D#8 s/p L ankle exploration, arthrotomy, I&D and vac placement.   all OR cultures negative.  LUI out.  Still with VAC.  ankle swelling but better movement.  no fever, n/v/d.  no dysuria.  no rash.  PICC okay. Remainder of ROS otherwise negative.    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, the pressure is not high&quot;  Depression  History of seizure: x1-2007  H/O lymph node biopsy: &quot;mediastinal&quot;-2005  History of appendectomy: as a child  History of tonsillectomy: as a child    Allergies  penicillin (Rash)    ANTIMICROBIALS:    ceFAZolin   IVPB 2000 every 8 hours (5/16-)    MEDICATIONS  (STANDING):  amitriptyline 25 at bedtime  aspirin  chewable 81 daily  levothyroxine 25 daily  QUEtiapine 200 at bedtime    Vital Signs Last 24 Hrs  T(F): 98 (05-29-19 @ 10:25), Max: 98.2 (05-28-19 @ 17:22)  HR: 79 (05-29-19 @ 10:25)  BP: 125/72 (05-29-19 @ 10:25)  RR: 18 (05-29-19 @ 10:25)  SpO2: 99% (05-29-19 @ 10:25) (97% - 100%)    PHYSICAL EXAM:  General: non-toxic  HEAD/EYES: anicteric  ENT:  supple  Cardiovascular:   S1, S2  Respiratory:  clear bilaterally  GI:  soft, non-tender, normal bowel sounds  :  no betancur  Musculoskeletal:  swollen L ankle that is post-op and with VAC. LUI out  Neurologic:  grossly non-focal  Skin:  no rash  Psychiatric: ok  Vascular:  L PICC c/d/i    Sedimentation Rate, Erythrocyte: 23 (05-20-19)  Sedimentation Rate, Erythrocyte: 20 (05-17-19)  Sedimentation Rate, Erythrocyte: 7 (05-13-19)              C-Reactive Protein, Serum: 8.2 (05-20-19)  C-Reactive Protein, Serum: 14.4 (05-13-19)             Surgical Pathology Report:   Final Diagnosis  1. Soft tissue mass, left ankle, excision:   - Fibroconnective tissue with degenerated mucin-like material and focal foreign body type giant cell reaction.  2. Soft tissue mass, left ankle, excision:  - Fibroconnective tissue with foci of necrosis, degenerated mucin-like material, granulomatous inflammation, and focal foreign body type giant cell reaction.  - Negative for Fungal organisms (GMS stain).  - AFB stain to be reported as addendum.  (03.28.19 @ 20:03)    MICROBIOLOGY:  Culture - Surg Site Aerob/Anaer w/Gm St (05.22.19 @ 10:13)    Gram Stain Wound:   NOS^No Organisms Seen  WBC^White Blood Cells  QNTY CELLS IN GRAM STAIN: FEW (2+)    Culture - Surgical Site:   NO GROWTH - PRELIMINARY RESULTS    Specimen Source: ANKLE    Culture - Surg Site Aerob/Anaer w/Gm St (05.22.19 @ 10:09)    Gram Stain Wound:   BLOODY SP.  NOS^No Organisms Seen  WBC^White Blood Cells  QNTY CELLS IN GRAM STAIN: RARE (1+)    Culture - Surgical Site:   NO GROWTH - PRELIMINARY RESULTS    Specimen Source: ANKLE    Culture - Fungal, Blood (05.20.19 @ 15:01)    Culture - Fungal, Blood:   CULTURE NEGATIVE FOR YEASTS AND MOLDS-PRELIMINARY RESULT  AFTER 24 HOURS INCUBATION    Specimen Source: BLOOD    Culture - Blood (05.16.19 @ 23:18)    Culture - Blood:   NO ORGANISMS ISOLATED    Specimen Source: BLOOD VENOUS    Culture - Yeast and Fungus (03.28.19 @ 20:24)  CULTURE NEGATIVE FOR YEASTS AND MOLDS   AFTER 4 WEEKS    Specimen Source: OTHER    Culture - Surg Site Aerob/Anaer w/Gm St (03.28.19 @ 20:24)    Gram Stain Wound:   NOS No Organisms Seen  Culture - Blood (05.16.19 @ 23:18)    Culture - Blood:   NO ORGANISMS ISOLATED    Specimen Source: BLOOD PERIPHERAL    WBC^White Blood Cells  QNTY CELLS IN GRAM STAIN: RARE (1+)    -  Cefazolin: S <=4 JOSE    -  Ciprofloxacin: S <=1 JOSE    -  Clindamycin: S    -  Erythromycin: S    -  Gentamicin: S <=1 JOSE    -  Levofloxacin: S <=0.5 JOSE    -  Moxifloxacin(Aerobic): S <=0.5 JOSE    -  Oxacillin: S <=0.25 JOSE    -  Rifampin: S <=1 JOSE    -  Tetra/Doxy: S <=1 JOSE    -  Trimethoprim/Sulfamethoxazole: S <=0.5/9.5 JOSE    -  Vancomycin: S 1 JOSE    Culture - Surgical Site:   RARE    Specimen Source: OTHER    Organism Identification: Staphylococcus aureus    RADIOLOGY:  imaging below personally reviewed    Xray Ankle 2 Views, Left (05.16.19 @ 21:30) >  IMPRESSION:  Soft tissue swelling of the left ankle. Small joint effusion. No radiographic evidence of osteomyelitis. No acute fracture.    MR Ankle w/ IV Cont, Left (05.17.19 @ 19:55) >  Impression: 5.8 x 2.2x 1.3 cm abscess anterior laterally at the level of the ankle. Large tibiotalar joint effusion with marked synovitis and marrow signal changes within the talus, distal tibia and distal fibula suggestive of septic arthritis/osteomyelitis. Area of nonenhancement within the lateral talus suspicious for ischemia or infarction.    1/27/19 - MRI left ankle - non-specific diffuse masslike synovial thickening of the tibiotalar joint presumably herniating through the syndesmosis and extending towards the anterolateral aspect of the ankle, corresponding to the area of palpable abnormality.  Findings are concerning for a synovial process.  Possibilities include PVNS (pigmented villonodular synovitis), inflammatory arthropathy, gout, and other etiologies.    11/14/17 - MRI left ankle - finding of synovitis with fluid and synovial thickening at the tibiotalar joint. subchondral erosions are seen at the tip of the fibula and at the base of the second metatarsal.  The findings are overall non-specific although could be seen with rheumatoid arthritis.

## 2019-05-29 NOTE — DIETITIAN INITIAL EVALUATION ADULT. - ADHERENCE
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, aware of diet restriction.

## 2019-05-30 ENCOUNTER — EMERGENCY (EMERGENCY)
Facility: HOSPITAL | Age: 54
LOS: 1 days | Discharge: HOME | End: 2019-05-30
Attending: EMERGENCY MEDICINE | Admitting: EMERGENCY MEDICINE
Payer: COMMERCIAL

## 2019-05-30 VITALS
HEART RATE: 96 BPM | TEMPERATURE: 98 F | DIASTOLIC BLOOD PRESSURE: 62 MMHG | SYSTOLIC BLOOD PRESSURE: 102 MMHG | OXYGEN SATURATION: 98 % | RESPIRATION RATE: 18 BRPM

## 2019-05-30 VITALS
RESPIRATION RATE: 18 BRPM | HEART RATE: 85 BPM | TEMPERATURE: 98 F | OXYGEN SATURATION: 99 % | DIASTOLIC BLOOD PRESSURE: 65 MMHG | SYSTOLIC BLOOD PRESSURE: 115 MMHG

## 2019-05-30 DIAGNOSIS — Z90.49 ACQUIRED ABSENCE OF OTHER SPECIFIED PARTS OF DIGESTIVE TRACT: Chronic | ICD-10-CM

## 2019-05-30 DIAGNOSIS — Z98.890 OTHER SPECIFIED POSTPROCEDURAL STATES: Chronic | ICD-10-CM

## 2019-05-30 DIAGNOSIS — Z90.89 ACQUIRED ABSENCE OF OTHER ORGANS: Chronic | ICD-10-CM

## 2019-05-30 LAB
ALBUMIN SERPL ELPH-MCNC: 4.2 G/DL — SIGNIFICANT CHANGE UP (ref 3.3–5)
ALP SERPL-CCNC: 98 U/L — SIGNIFICANT CHANGE UP (ref 40–120)
ALT FLD-CCNC: 11 U/L — SIGNIFICANT CHANGE UP (ref 4–33)
ANION GAP SERPL CALC-SCNC: 14 MMO/L — SIGNIFICANT CHANGE UP (ref 7–14)
AST SERPL-CCNC: 32 U/L — SIGNIFICANT CHANGE UP (ref 4–32)
BASOPHILS # BLD AUTO: 0.05 K/UL — SIGNIFICANT CHANGE UP (ref 0–0.2)
BASOPHILS NFR BLD AUTO: 0.7 % — SIGNIFICANT CHANGE UP (ref 0–2)
BILIRUB SERPL-MCNC: 0.4 MG/DL — SIGNIFICANT CHANGE UP (ref 0.2–1.2)
BUN SERPL-MCNC: 19 MG/DL — SIGNIFICANT CHANGE UP (ref 7–23)
CALCIUM SERPL-MCNC: 9.9 MG/DL — SIGNIFICANT CHANGE UP (ref 8.4–10.5)
CHLORIDE SERPL-SCNC: 101 MMOL/L — SIGNIFICANT CHANGE UP (ref 98–107)
CO2 SERPL-SCNC: 24 MMOL/L — SIGNIFICANT CHANGE UP (ref 22–31)
CREAT SERPL-MCNC: 0.61 MG/DL — SIGNIFICANT CHANGE UP (ref 0.5–1.3)
CRP SERPL-MCNC: 6.3 MG/L — HIGH
EOSINOPHIL # BLD AUTO: 0.33 K/UL — SIGNIFICANT CHANGE UP (ref 0–0.5)
EOSINOPHIL NFR BLD AUTO: 4.5 % — SIGNIFICANT CHANGE UP (ref 0–6)
ERYTHROCYTE [SEDIMENTATION RATE] IN BLOOD: 23 MM/HR — SIGNIFICANT CHANGE UP (ref 4–25)
ERYTHROCYTE [SEDIMENTATION RATE] IN BLOOD: 23 MM/HR — SIGNIFICANT CHANGE UP (ref 4–25)
GLUCOSE SERPL-MCNC: 95 MG/DL — SIGNIFICANT CHANGE UP (ref 70–99)
HCT VFR BLD CALC: 37.4 % — SIGNIFICANT CHANGE UP (ref 34.5–45)
HGB BLD-MCNC: 12.4 G/DL — SIGNIFICANT CHANGE UP (ref 11.5–15.5)
IMM GRANULOCYTES NFR BLD AUTO: 0.3 % — SIGNIFICANT CHANGE UP (ref 0–1.5)
LYMPHOCYTES # BLD AUTO: 2.08 K/UL — SIGNIFICANT CHANGE UP (ref 1–3.3)
LYMPHOCYTES # BLD AUTO: 28.2 % — SIGNIFICANT CHANGE UP (ref 13–44)
MCHC RBC-ENTMCNC: 30.5 PG — SIGNIFICANT CHANGE UP (ref 27–34)
MCHC RBC-ENTMCNC: 33.2 % — SIGNIFICANT CHANGE UP (ref 32–36)
MCV RBC AUTO: 92.1 FL — SIGNIFICANT CHANGE UP (ref 80–100)
MONOCYTES # BLD AUTO: 0.67 K/UL — SIGNIFICANT CHANGE UP (ref 0–0.9)
MONOCYTES NFR BLD AUTO: 9.1 % — SIGNIFICANT CHANGE UP (ref 2–14)
NEUTROPHILS # BLD AUTO: 4.23 K/UL — SIGNIFICANT CHANGE UP (ref 1.8–7.4)
NEUTROPHILS NFR BLD AUTO: 57.2 % — SIGNIFICANT CHANGE UP (ref 43–77)
NRBC # FLD: 0 K/UL — SIGNIFICANT CHANGE UP (ref 0–0)
PLATELET # BLD AUTO: 345 K/UL — SIGNIFICANT CHANGE UP (ref 150–400)
PMV BLD: 9.1 FL — SIGNIFICANT CHANGE UP (ref 7–13)
POTASSIUM SERPL-MCNC: 4.4 MMOL/L — SIGNIFICANT CHANGE UP (ref 3.5–5.3)
POTASSIUM SERPL-SCNC: 4.4 MMOL/L — SIGNIFICANT CHANGE UP (ref 3.5–5.3)
PROT SERPL-MCNC: 7.4 G/DL — SIGNIFICANT CHANGE UP (ref 6–8.3)
RBC # BLD: 4.06 M/UL — SIGNIFICANT CHANGE UP (ref 3.8–5.2)
RBC # FLD: 11.9 % — SIGNIFICANT CHANGE UP (ref 10.3–14.5)
SODIUM SERPL-SCNC: 139 MMOL/L — SIGNIFICANT CHANGE UP (ref 135–145)
WBC # BLD: 7.38 K/UL — SIGNIFICANT CHANGE UP (ref 3.8–10.5)
WBC # FLD AUTO: 7.38 K/UL — SIGNIFICANT CHANGE UP (ref 3.8–10.5)

## 2019-05-30 PROCEDURE — 99218: CPT

## 2019-05-30 PROCEDURE — 99239 HOSP IP/OBS DSCHRG MGMT >30: CPT

## 2019-05-30 RX ORDER — AMITRIPTYLINE HCL 25 MG
25 TABLET ORAL ONCE
Refills: 0 | Status: COMPLETED | OUTPATIENT
Start: 2019-05-30 | End: 2019-05-31

## 2019-05-30 RX ORDER — OXYCODONE HYDROCHLORIDE 5 MG/1
5 TABLET ORAL EVERY 4 HOURS
Refills: 0 | Status: DISCONTINUED | OUTPATIENT
Start: 2019-05-30 | End: 2019-05-30

## 2019-05-30 RX ORDER — LEVOTHYROXINE SODIUM 125 MCG
1 TABLET ORAL
Qty: 30 | Refills: 0
Start: 2019-05-30 | End: 2019-06-28

## 2019-05-30 RX ORDER — OXYCODONE HYDROCHLORIDE 5 MG/1
1 TABLET ORAL
Qty: 30 | Refills: 0
Start: 2019-05-30 | End: 2019-06-03

## 2019-05-30 RX ORDER — CEFAZOLIN SODIUM 1 G
2000 VIAL (EA) INJECTION EVERY 8 HOURS
Refills: 0 | Status: DISCONTINUED | OUTPATIENT
Start: 2019-05-31 | End: 2019-06-03

## 2019-05-30 RX ORDER — AMITRIPTYLINE HCL 25 MG
1 TABLET ORAL
Qty: 30 | Refills: 0
Start: 2019-05-30 | End: 2019-06-28

## 2019-05-30 RX ORDER — QUETIAPINE FUMARATE 200 MG/1
1 TABLET, FILM COATED ORAL
Qty: 30 | Refills: 0
Start: 2019-05-30 | End: 2019-06-28

## 2019-05-30 RX ORDER — CEFAZOLIN SODIUM 1 G
2000 VIAL (EA) INJECTION ONCE
Refills: 0 | Status: COMPLETED | OUTPATIENT
Start: 2019-05-30 | End: 2019-05-30

## 2019-05-30 RX ORDER — QUETIAPINE FUMARATE 200 MG/1
200 TABLET, FILM COATED ORAL ONCE
Refills: 0 | Status: COMPLETED | OUTPATIENT
Start: 2019-05-30 | End: 2019-05-31

## 2019-05-30 RX ADMIN — Medication 100 MILLIGRAM(S): at 06:18

## 2019-05-30 RX ADMIN — Medication 100 MILLIGRAM(S): at 22:50

## 2019-05-30 RX ADMIN — Medication 1 TABLET(S): at 06:18

## 2019-05-30 RX ADMIN — Medication 25 MICROGRAM(S): at 06:18

## 2019-05-30 NOTE — PROGRESS NOTE ADULT - PROBLEM SELECTOR PROBLEM 3
Acquired hypothyroidism
Hodgkin lymphoma, unspecified Hodgkin lymphoma type, unspecified body region
Acquired hypothyroidism
Acquired hypothyroidism
Osteomyelitis, unspecified site, unspecified type
Osteomyelitis, unspecified site, unspecified type

## 2019-05-30 NOTE — PROGRESS NOTE ADULT - PROBLEM SELECTOR PROBLEM 1
Septic arthritis, due to unspecified organism, septic arthritis of unspecified location
Preop cardiovascular exam
Preop cardiovascular exam
Septic arthritis, due to unspecified organism, septic arthritis of unspecified location

## 2019-05-30 NOTE — PROGRESS NOTE ADULT - PROBLEM SELECTOR PROBLEM 4
Hodgkin lymphoma, unspecified Hodgkin lymphoma type, unspecified body region
Acquired hypothyroidism
Hodgkin lymphoma, unspecified Hodgkin lymphoma type, unspecified body region
Hypernatremia
Hypernatremia

## 2019-05-30 NOTE — PROGRESS NOTE ADULT - PROBLEM SELECTOR PROBLEM 5
Cerebrovascular accident (CVA), unspecified mechanism
Acquired hypothyroidism
Acquired hypothyroidism

## 2019-05-30 NOTE — PROGRESS NOTE ADULT - REASON FOR ADMISSION
Left ankle swelling
ankle swelling

## 2019-05-30 NOTE — PROGRESS NOTE ADULT - ATTENDING COMMENTS
PT recs: home PT  Dispo: DC home after wound vac change 5/27 and ensured that antibiotics set up for home
PT recs: home PT  Dispo: DC home after wound vac change 5/27
PT recs: home PT  Dispo: DC home after wound vac change 5/27 and ensured that antibiotics set up for home
PT recs: home PT  Dispo: DC home after wound vac change 5/27 and ensured that antibiotics set up for home. DC time: 34 min
stable for discharge home today  total time spent on discharge 40min
PT recs: home PT

## 2019-05-30 NOTE — PROGRESS NOTE ADULT - PROBLEM SELECTOR PROBLEM 7
Cerebrovascular accident (CVA), unspecified mechanism
Depression, unspecified depression type
Cerebrovascular accident (CVA), unspecified mechanism
Depression, unspecified depression type

## 2019-05-30 NOTE — PROGRESS NOTE ADULT - PROBLEM SELECTOR PLAN 3
TSH elevated and free T4 low normal.  Patient says she has been off synthroid 25mcg qdaily for the last few months b/c her last TFTs were normal. synthroid 25mcg qdaily was restarted and check TFT in 4 weeks.
stable  F/U as outpatient
TSH elevated and free T4 low normal.  Patient says she has been off synthroid 25mcg qdaily for the last few months b/c her last TFTs were normal. synthroid 25mcg qdaily was restarted and check TFT in 4 weeks.
TSH elevated and free T4 low normal.  Patient says she has been off synthroid 25mcg qdaily for the last few months b/c her last TFTs were normal. synthroid 25mcg qdaily was restarted and check TFT in 4 weeks.
ID following  Plan as above
ID following  Plan as above

## 2019-05-30 NOTE — ED PROVIDER NOTE - OBJECTIVE STATEMENT
53F with pmh CVA, seizures, hypothyroid depression discharged today with a midline for IV abx presenting with midline malfunction. Patient is s/p L ankle cyst and abscess removal in March 2019 and completed outpatient course of Doxycycline c/b with recurrent infection admitted recently for septic arthritis. 53F with pmh CVA, seizures, hypothyroid depression discharged today with a midline for IV abx presenting with midline malfunction. Patient is s/p L ankle cyst and abscess removal in March 2019 and completed outpatient course of Doxycycline c/b with recurrent infection admitted recently for septic arthritis.    Attending/Gypsy: 52 yo M as described above, complicated course of left ankle cyst and abscess resection, d/c with midline this morning for outpatient anabiotics as well home visiting nursing wound care. Upon arrival at home noted midline was not functioning. No acute changes.

## 2019-05-30 NOTE — PROGRESS NOTE ADULT - PROVIDER SPECIALTY LIST ADULT
Anesthesia
Hospitalist
Infectious Disease
Orthopedics
Infectious Disease
Infectious Disease
Hospitalist

## 2019-05-30 NOTE — PROGRESS NOTE ADULT - PROBLEM SELECTOR PROBLEM 6
History of seizure
Hodgkin lymphoma, unspecified Hodgkin lymphoma type, unspecified body region
Hodgkin lymphoma, unspecified Hodgkin lymphoma type, unspecified body region
History of seizure

## 2019-05-30 NOTE — PROGRESS NOTE ADULT - ASSESSMENT
53F hx osteoarthritis, treated NHL s/p chemo/RT in remission 2005, hx cdiff recent left ankle mass removal 3/22 p/w L foot/ankle edema and erythema.  MRI results noted.  Since 2017, there is evidence of synovial abnormality after stem cell injection (6/2017).  Recent MRI with septic arthritis and OM - likely chronic given duration of her illness.  Patient needs surgical debridement.  Ideally off antibiotics, however, patient will likely go tomorrow. Hx Cdiff.  Patient is resistant to acknowledging prior MRI with synovial abnormalities even dating back to 11/2017.  s/p OR yesterday L ankle exploration, arthrotomy, I&D and vac placement.    Suggest:  - f/u OR culture  - please send pathology of the synovial tissue as well as CULTURES: fungal, afb and bacterial cultures  - trend inflammatory markers  - can change to ancef 2g IV q8  - duration TBD  - will need to discuss with orthopedic attending
53F hx osteoarthritis, treated NHL s/p chemo/RT in remission 2005, hx cdiff recent left ankle mass removal 3/22 p/w L foot/ankle edema and erythema.  MRI results noted.  Since 2017, there is evidence of synovial abnormality after stem cell injection (6/2017).  Recent MRI with septic arthritis and OM - likely chronic given duration of her illness. Hx Cdiff.  Abnl MRI of the left ankle at least since 11/2017.  s/p OR for L ankle exploration, arthrotomy, I&D and vac placement.    Suggest:  - f/u OR cultures and path  - await call back from ortho attending  - continue ancef 2g IV q8  - duration TBD    d/w medicine
53 y.o. Female w/ hx  C. diff, CVA and seizure d/o, hodgkin's lymphoma c/b acquired hypothyroidism, depression, Left MSSA ankle mass excision on 3/28 by Dr Cain s/p 4 week of doxycycline ( last dose 5/8)  p/w  1 week of worsening left lateral ankle swelling and redness not improved with keflex. MRI of ankle shows and anteriolateral ankle abscess and osteomyelitis s/p L ankle exploration, arthrotomy, I&D and vac placement on 5/21
53 y.o. Female w/ hx  C. diff, CVA and seizure d/o, hodgkin's lymphoma c/b acquired hypothyroidism, depression, Left MSSA ankle mass excision on 3/28 by Dr Cain s/p 4 week of doxycycline ( last dose 5/8)  p/w  1 week of worsening left lateral ankle swelling and redness not improved with keflex. MRI of ankle shows and anteriolateral ankle abscess and osteomyelitis.
53F hx osteoarthritis, treated NHL s/p chemo/RT in remission 2005, hx cdiff recent left ankle mass removal 3/22 p/w L foot/ankle edema and erythema.  MRI results noted.  Since 2017, there is evidence of synovial abnormality after stem cell injection (6/2017).  Recent MRI with septic arthritis and OM - likely chronic given duration of her illness.  Patient needs surgical debridement.  Ideally off antibiotics, however, patient will likely go tomorrow. Hx Cdiff.  Patient is resistant to acknowledging prior MRI with synovial abnormalities even dating back to 11/2017.    Suggest:  - f/u OR today  - please send pathology of the synovial tissue as well as CULTURES: fungal, afb and bacterial cultures  - trend inflammatory markers
53F hx osteoarthritis, treated NHL s/p chemo/RT in remission 2005, hx cdiff recent left ankle mass removal 3/22 p/w L foot/ankle edema and erythema.  MRI results noted.  Since 2017, there is evidence of synovial abnormality after stem cell injection (6/2017).  Recent MRI with septic arthritis and OM - likely chronic given duration of her illness. Hx Cdiff.  Abnl MRI of the left ankle at least since 11/2017.  s/p OR for L ankle exploration, arthrotomy, I&D and vac placement.    Suggest:  - continue ancef 2g IV q8  - duration until 6/12/19    d/w medicine     Please call the ID service [phone] with questions or concerns over the weekend
53F hx osteoarthritis, treated NHL s/p chemo/RT in remission 2005, hx cdiff recent left ankle mass removal 3/22 p/w L foot/ankle edema and erythema.  MRI results noted.  Since 2017, there is evidence of synovial abnormality after stem cell injection (6/2017).  Recent MRI with septic arthritis and OM - likely chronic given duration of her illness. Hx Cdiff.  Abnl MRI of the left ankle at least since 11/2017.  s/p OR for L ankle exploration, arthrotomy, I&D and vac placement.    Suggest:  - continue ancef 2g IV q8  - please repeat ESR and CRP  - weekly - cbc, cmp, esr, crp - fax results to [phone]  - outpatient ID f/u - please call [phone]  - duration until 6/12/19  - d/c planning
53F hx osteoarthritis, treated NHL s/p chemo/RT in remission 2005, hx cdiff recent left ankle mass removal 3/22 p/w L foot/ankle edema and erythema.  MRI results noted.  Since 2017, there is evidence of synovial abnormality after stem cell injection (6/2017).  Recent MRI with septic arthritis and OM - likely chronic given duration of her illness. Hx Cdiff.  Abnl MRI of the left ankle at least since 11/2017.  s/p OR for L ankle exploration, arthrotomy, I&D and vac placement.    Suggest:  - continue ancef 2g IV q8  - please repeat ESR and CRP  - weekly - cbc, cmp, esr, crp - fax results to [phone]  - outpatient ID f/u - please call [phone] - scheduled for 6/13/19  - duration until 6/12/19  - d/c planning for tomorrow    above d/w NP [email or web address]    Please call Infectious Diseases if there is a change in status.  Thank you.  [phone].    I will be away returning 5/31/19.   ID available.  Please call [phone].
53F hx osteoarthritis, treated NHL s/p chemo/RT in remission 2005, hx cdiff recent left ankle mass removal 3/22 p/w L foot/ankle edema and erythema.  MRI results noted.  septic arthritis and OM - likely chronic given duration of her illness.  Patient needs surgical debridement.  Ideally off antibiotics, however, patient will likely go tomorrow. Hx Cdiff    Suggest:  - ortho f/u  - needs fungal, afb and bacterial cultures  - trend inflammatory markers    d/w ortho attending
A/P: 53yoF s/p L ankle arthrotomy, I&D, wound vac placement 5/21    Neuro: Pain control  Resp: IS  GI: Regular diet, bowel reg  MSK: MONAE LLE, PT/OT  Heme: DVT PPX  FU LUI output  FU WV output (keep on until 5/27, change, then keep on for additional 3 days)  ID: abx plan per ID  dispo: planning
A/P: 53yoF s/p L ankle arthrotomy, I&D, wound vac placement 5/21    Neuro: Pain control  Resp: IS  GI: Regular diet, bowel reg  MSK: NWB LLE, PT/OT  Heme: DVT PPX  FU LUI output (remove when WV is changed 5/27)  FU WV output (keep on until 5/27, change, then keep on for additional 3 days)  ID: abx plan per ID: ancef  dispo: planning
53 y.o. Female w/ hx  C. diff, CVA and seizure d/o, hodgkin's lymphoma c/b acquired hypothyroidism, depression, Left MSSA ankle mass excision on 3/28 by Dr Cain s/p 4 week of doxycycline ( last dose 5/8)  p/w  1 week of worsening left lateral ankle swelling and redness not improved with keflex. MRI of ankle shows and anteriolateral ankle abscess and osteomyelitis s/p L ankle exploration, arthrotomy, I&D and vac placement on 5/21
53 y.o. Female w/ hx  C. diff, CVA and seizure d/o, hodgkin's lymphoma c/b acquired hypothyroidism, depression, Left MSSA ankle mass excision on 3/28 by Dr Cain s/p 4 week of doxycycline ( last dose 5/8)  p/w  1 week of worsening left lateral ankle swelling and redness not improved with keflex. MRI of ankle shows and anteriolateral ankle abscess and osteomyelitis s/p L ankle exploration, arthrotomy, I&D and vac placement on 5/21
53 y.o. Female w/ hx  C. diff, CVA and seizure d/o, hodgkin's lymphoma c/b acquired hypothyroidism, depression, Left MSSA ankle mass excision on 3/28 by Dr Cain s/p 4 week of doxycycline ( last dose 5/8)  p/w  1 week of worsening left lateral ankle swelling and redness not improved with keflex. MRI of ankle shows and anteriolateral ankle abscess and osteomyelitis.
53 y.o. Female w/ hx  C. diff, CVA and seizure d/o, hodgkin's lymphoma c/b acquired hypothyroidism, depression, Left MSSA ankle mass excision on 3/28 by Dr Cain s/p 4 week of doxycycline ( last dose 5/8)  p/w  1 week of worsening left lateral ankle swelling and redness not improved with keflex. MRI of ankle shows and anteriolateral ankle abscess and osteomyelitis.
53 y.o. Female w/ hx  C. diff, CVA and seizure d/o, hodgkin's lymphoma c/b acquired hypothyroidism, depression, Left MSSA ankle mass excision on 3/28 by Dr Cain s/p 4 week of doxycycline ( last dose 5/8)  p/w  1 week of worsening left lateral ankle swelling and redness not improved with keflex. MRI of ankle shows and anteriolateral ankle abscess and osteomyelitis s/p L ankle exploration, arthrotomy, I&D and vac placement on 5/21

## 2019-05-30 NOTE — ED ADULT NURSE NOTE - NSIMPLEMENTINTERV_GEN_ALL_ED
Implemented All Fall with Harm Risk Interventions:  [place] to call system. Call bell, personal items and telephone within reach. Instruct patient to call for assistance. Room bathroom lighting operational. Non-slip footwear when patient is off stretcher. Physically safe environment: no spills, clutter or unnecessary equipment. Stretcher in lowest position, wheels locked, appropriate side rails in place. Provide visual cue, wrist band, yellow gown, etc. Monitor gait and stability. Monitor for mental status changes and reorient to person, place, and time. Review medications for side effects contributing to fall risk. Reinforce activity limits and safety measures with patient and family. Provide visual clues: red socks.

## 2019-05-30 NOTE — PROGRESS NOTE ADULT - PROBLEM SELECTOR PLAN 7
c/w seroquel and elavil.
not on ASA or statin as per patient and declined to take it.  She says she takes alternative japanese herbs for CVA instead
not on ASA or statin as per patient and declined to take it.  She says she takes alternative japanese herbs for CVA instead
c/w seroquel and elavil.

## 2019-05-30 NOTE — PROGRESS NOTE ADULT - PROBLEM SELECTOR PROBLEM 2
Osteomyelitis, unspecified site, unspecified type
Septic arthritis, due to unspecified organism, septic arthritis of unspecified location
Septic arthritis, due to unspecified organism, septic arthritis of unspecified location

## 2019-05-30 NOTE — ED CDU PROVIDER INITIAL DAY NOTE - PHYSICAL EXAMINATION
Vital signs reviewed.   CONSTITUTIONAL: Well-appearing; well-nourished; in no apparent distress. Non-toxic appearing.   HEAD: Normocephalic, atraumatic.  EYES: PERRL, EOM intact, conjunctiva and sclera WNL.  ENT: normal nose; no rhinorrhea;   CARD: Normal S1, S2; no murmurs, rubs, or gallops noted.  RESP: Normal chest excursion with respiration; breath sounds clear and equal bilaterally; no wheezes, rhonchi, or rales.  EXT/MS: moves all extremities; distal pulses are normal, +Lt foot wound with approx. 3cm wound and two stiches in place, area appears C/D/I. +surrounding cellulitis with erythema noted. No fluctuance, no lymphangitis noted. normal cap refill. Compartments soft.   SKIN: Normal for age and race; warm; dry; good turgor; See EXT section. +Lt UE bruising surrounding Lt midline, compartment soft, no crepitus, no signs of compartment syndrome. No fluctuance or induration noted.   NEURO: Awake, alert, oriented x 3, no gross deficits, no motor or sensory deficit noted.  PSYCH: Normal mood; appropriate affect. Vital signs reviewed.   CONSTITUTIONAL: Well-appearing; well-nourished; in no apparent distress. Non-toxic appearing.   HEAD: Normocephalic, atraumatic.  EYES: PERRL, EOM intact, conjunctiva and sclera WNL.  ENT: normal nose; no rhinorrhea;   CARD: Normal S1, S2; no murmurs, rubs, or gallops noted.  RESP: Normal chest excursion with respiration; breath sounds clear and equal bilaterally; no wheezes, rhonchi, or rales.  EXT/MS: moves all extremities; distal pulses are normal, +Lt foot wound with approx. 3cm wound and two stiches in place, area appears C/D/I. +surrounding cellulitis with erythema noted. No fluctuance, no lymphangitis noted. normal cap refill. Compartments soft.   SKIN: Normal for age and race; warm; dry; good turgor; See EXT section. +Lt UE bruising surrounding Lt midline, compartment soft, no crepitus, no signs of compartment syndrome. No fluctuance or induration noted.   NEURO: Awake, alert, oriented x 3, no gross deficits, no motor or sensory deficit noted.  PSYCH: Normal mood; appropriate affect.    Attending/Gypsy: As documented above

## 2019-05-30 NOTE — ED PROVIDER NOTE - SKIN WOUND TYPE
left arm with midline site intact with ecchymosis, non tender, no crepitus. Left ankle wound from prior procedures, 3cm open wound no drainage, no surrounding erythema, covered wet to dry dressing

## 2019-05-30 NOTE — ED PROVIDER NOTE - CCCP TRG CHIEF CMPLNT
pain, arm/mid line port not working as per pt with no blood return and increased foot swelling @  surgery site

## 2019-05-30 NOTE — ED ADULT NURSE NOTE - CCCP TRG CHIEF CMPLNT
mid line port not working as per pt with no blood return and increased foot swelling @  surgery site/pain, arm

## 2019-05-30 NOTE — ED CDU PROVIDER INITIAL DAY NOTE - DETAILS
Patient is a 54 y.o female with PMHx of CVA, seizures, hypothyroid, depression who presents to ED stating her midline IV is not working as per home nurse. Pt recently admitted for Lt ankle septic joint and dc today with midline for outpatient IV infusions. Pt seen and worked up in ED, transferred to CDU for IV abx, Procedure team consult, vitals q4, frequent re-evals.

## 2019-05-30 NOTE — PROGRESS NOTE ADULT - SUBJECTIVE AND OBJECTIVE BOX
Orthopedic Surgery Progress Note  S: Pain is well controlled.  No nausea, vomiting, chest pain, shortness of breath, lightheadedness, or dizziness. Patient comfortable and sleeping when entered room.     O:  Vital Signs Last 24 Hrs  T(C): 36.4 (30 May 2019 06:15), Max: 36.9 (29 May 2019 22:02)  T(F): 97.5 (30 May 2019 06:15), Max: 98.5 (29 May 2019 22:02)  HR: 80 (30 May 2019 06:15) (79 - 94)  BP: 90/62 (30 May 2019 06:15) (90/62 - 125/72)  BP(mean): --  RR: 18 (30 May 2019 06:15) (16 - 18)  SpO2: 100% (30 May 2019 06:15) (99% - 100%)    Physical Exam:  Gen: Laying in bed, NAD, alert and oriented.   Resp: Unlabored breathing  Ext: EHL/FHL/TA/Sol intact          + SILT DP/SP/OG/Sa          +DP, extremity WWP  Wound vac removed, replaced with WTD dressing  Wound clean and dry, with granulation tissue in wound bed   LIU in place with SS output    A/P: 53yoF s/p L ankle arthrotomy, I&D, wound vac placement 5/21, vac removed this AM. Cleared for d/c from ortho standpoint with daily WTD dressing.     Neuro: Pain control  Resp: IS  GI: Regular diet, bowel reg  MSK: NWB LLE, PT/OT  Heme: DVT PPX  ID: [id number] plan per ID: ancef  dispo planning: Home

## 2019-05-30 NOTE — ED CDU PROVIDER INITIAL DAY NOTE - OBJECTIVE STATEMENT
HPI: Patient is a 54 y.o female with PMHx of CVA, seizures, hodgkin's lymphoma, hypothyroid, depression who presents to ED stating her midline IV is not working as per home nurse. Pt recently admitted for Lt ankle septic joint for ankle abscess and osteomyelitis, underwent Lt ankle exploratory sx with arthrotomy and i&D with wound vac. Patient was discharge earlier today, midline was placed to Lt arm for patient to receive IV abx infusions x 4 weeks. Pt had home care nurse come today and states RN unable to use line and told her to come to ED. Pt also notes that Lt foot has appeared swollen x 3 days. Pt has no other complaints. Denies fevers, chills, n/v/d, abdominal pain, numbness or tingling, weakness, worsening swelling, redness or any other complaints.  Pt seen and worked up in ED, transferred to CDU for IV abx, Procedure team consult, vitals q4, frequent re-evals. HPI: Patient is a 54 y.o female with PMHx of CVA, seizures, hodgkin's lymphoma, hypothyroid, depression who presents to ED stating her midline IV is not working as per home nurse. Pt recently admitted for Lt ankle septic joint for ankle abscess and osteomyelitis, underwent Lt ankle exploratory sx with arthrotomy and i&D with wound vac. Patient was discharge earlier today, midline was placed to Lt arm for patient to receive IV abx infusions x 4 weeks. Pt had home care nurse come today and states RN unable to use line and told her to come to ED. Pt also notes that Lt foot has appeared swollen x 3 days. Pt has no other complaints. Denies fevers, chills, n/v/d, abdominal pain, numbness or tingling, weakness, worsening swelling, redness or any other complaints.  Pt seen and worked up in ED, transferred to CDU for IV abx, Procedure team consult, vitals q4, frequent re-evals.    Attending/Gypsy: 52 yo M as described above, complicated course of left ankle cyst and abscess resection, d/c with midline this morning for outpatient anabiotics as well home visiting nursing wound care. Upon arrival at home noted midline was not functioning. No acute changes.

## 2019-05-30 NOTE — ED CDU PROVIDER INITIAL DAY NOTE - PROGRESS NOTE DETAILS
Call placed to Ortho, spoke to resident Axel made him aware that patient returned to ED for Midline IV malfunction, aware patient stating foot slightly more swollen x 3 days. State he saw foot this morning with MD Bronson and no need for consult.

## 2019-05-30 NOTE — ED ADULT TRIAGE NOTE - CCCP TRG CHIEF CMPLNT
mid line port not working as per pt with no blood return and increased foot swelling @  surgery site pain, arm/mid line port not working as per pt with no blood return and increased foot swelling @  surgery site

## 2019-05-30 NOTE — PROGRESS NOTE ADULT - PROBLEM SELECTOR PLAN 2
ID following  Plan as above
ID following  will need extended course of abx as per ID
ID following  Plan as above
ID following  Plan as above
c/w clindamycin, ID following  MRI left ankle with 5.8 x2.2 x1.3cm abscess, pending OR 5/21 by ortho  Will need fungal, afb and bacterial cultures sent from OR path  Bcx NGTD  Refused pain meds and ice pack. Recommended LLE elevation
c/w clindamycin, ID following  MRI left ankle with 5.8 x2.2 x1.3cm abscess, pending OR 5/21 by ortho  Will need fungal, afb and bacterial cultures sent from OR path  Bcx NGTD  Refused pain meds and ice pack. Recommended LLE elevation

## 2019-05-30 NOTE — PROGRESS NOTE ADULT - SUBJECTIVE AND OBJECTIVE BOX
Patient is a 54y old  Female who presents with a chief complaint of ankle swelling (30 May 2019 08:06)      SUBJECTIVE / OVERNIGHT EVENTS:    No acute event. No new complaint. Wound vac removed by ortho team this am    Review of Systems:    CONSTITUTIONAL: No fever, weight loss, or fatigue  EYES: No eye pain, visual disturbances, or discharge  ENMT:  No difficulty hearing, tinnitus, vertigo; No sinus or throat pain  NECK: No pain or stiffness  RESPIRATORY: No cough, wheezing, chills or hemoptysis; No shortness of breath  CARDIOVASCULAR: No chest pain, palpitations, dizziness, or leg swelling  GASTROINTESTINAL: No abdominal or epigastric pain. No nausea, vomiting, or hematemesis; No diarrhea or constipation. No melena or hematochezia.  GENITOURINARY: No dysuria, frequency, hematuria, or incontinence  NEUROLOGICAL: No headaches, memory loss, loss of strength, numbness, or tremors  SKIN: No itching, burning, rashes, or lesions   MUSCULOSKELETAL: No joint pain or swelling; No muscle, back, or extremity pain  PSYCHIATRIC: No depression, anxiety, mood swings, or difficulty sleeping      MEDICATIONS  (STANDING):  amitriptyline 25 milliGRAM(s) Oral at bedtime  aspirin  chewable 81 milliGRAM(s) Oral daily  ceFAZolin   IVPB      ceFAZolin   IVPB 2000 milliGRAM(s) IV Intermittent every 8 hours  chlorhexidine 4% Liquid 1 Application(s) Topical <User Schedule>  lactobacillus acidophilus 1 Tablet(s) Oral two times a day  levothyroxine 25 MICROGram(s) Oral daily  QUEtiapine 200 milliGRAM(s) Oral at bedtime    MEDICATIONS  (PRN):  acetaminophen   Tablet .. 650 milliGRAM(s) Oral every 6 hours PRN Mild Pain (1 - 3)  oxyCODONE    IR 5 milliGRAM(s) Oral every 4 hours PRN moderate to severe pain (4-10)      PHYSICAL EXAM:  T(C): 36.7 (05-30-19 @ 10:22), Max: 36.9 (05-29-19 @ 22:02)  HR: 85 (05-30-19 @ 10:22) (80 - 94)  BP: 115/65 (05-30-19 @ 10:22) (90/62 - 119/77)  RR: 18 (05-30-19 @ 10:22) (16 - 18)  SpO2: 99% (05-30-19 @ 10:22) (99% - 100%)  I&O's Summary    30 May 2019 07:01  -  30 May 2019 15:01  --------------------------------------------------------  IN: 0 mL / OUT: 300 mL / NET: -300 mL      GENERAL: appearing male female lying in bed in NAD   MENTAL STATUS/PSYCH:  AAO x3   HEAD:  Atraumatic, Normocephalic  EYES: EOMI, PERRLA, conjunctiva and sclera clear  NECK: Supple, No elevated JVD  CHEST/LUNG: Clear to auscultation bilaterally; No wheeze  HEART: Regular rate and rhythm; No murmurs, rubs, or gallops  ABDOMEN: Soft, Nontender, Nondistended; Bowel sounds present  EXTREMITIES:  2+ Peripheral Pulses, No clubbing, cyanosis, or edema  NEUROLOGY: CN II-XII grossly intact, moving all extremities  SKIN: No rashes or lesions    LABS:  CAPILLARY BLOOD GLUCOSE                          RADIOLOGY & ADDITIONAL TESTS:    Imaging Personally Reviewed:    Consultant(s) Notes Reviewed:      Care Discussed with Consultants/Other Providers: Patient is a 54y old  Female who presents with a chief complaint of ankle swelling (30 May 2019 08:06)      SUBJECTIVE / OVERNIGHT EVENTS:    No acute event. No new complaint. Wound vac removed by ortho team this am    Review of Systems:    RESPIRATORY: No cough, wheezing, chills or hemoptysis; No shortness of breath  CARDIOVASCULAR: No chest pain, palpitations, dizziness, or leg swelling  GASTROINTESTINAL: No abdominal or epigastric pain. No nausea, vomiting, or hematemesis; No diarrhea or constipation. No melena or hematochezia.      MEDICATIONS  (STANDING):  amitriptyline 25 milliGRAM(s) Oral at bedtime  aspirin  chewable 81 milliGRAM(s) Oral daily  ceFAZolin   IVPB      ceFAZolin   IVPB 2000 milliGRAM(s) IV Intermittent every 8 hours  chlorhexidine 4% Liquid 1 Application(s) Topical <User Schedule>  lactobacillus acidophilus 1 Tablet(s) Oral two times a day  levothyroxine 25 MICROGram(s) Oral daily  QUEtiapine 200 milliGRAM(s) Oral at bedtime    MEDICATIONS  (PRN):  acetaminophen   Tablet .. 650 milliGRAM(s) Oral every 6 hours PRN Mild Pain (1 - 3)  oxyCODONE    IR 5 milliGRAM(s) Oral every 4 hours PRN moderate to severe pain (4-10)      PHYSICAL EXAM:  T(C): 36.7 (05-30-19 @ 10:22), Max: 36.9 (05-29-19 @ 22:02)  HR: 85 (05-30-19 @ 10:22) (80 - 94)  BP: 115/65 (05-30-19 @ 10:22) (90/62 - 119/77)  RR: 18 (05-30-19 @ 10:22) (16 - 18)  SpO2: 99% (05-30-19 @ 10:22) (99% - 100%)  I&O's Summary    30 May 2019 07:01  -  30 May 2019 15:01  --------------------------------------------------------  IN: 0 mL / OUT: 300 mL / NET: -300 mL      GENERAL: middle female lying in bed in NAD   MENTAL STATUS/PSYCH:  AAO x3   HEAD:  Atraumatic, Normocephalic  EYES: EOMI, PERRLA, conjunctiva and sclera clear  NECK: Supple, No elevated JVD  CHEST/LUNG: Clear to auscultation bilaterally; No wheeze  HEART: Regular rate and rhythm; No murmurs, rubs, or gallops  ABDOMEN: Soft, Nontender, Nondistended; Bowel sounds present  EXTREMITIES:  L ankle wound dressing c/di. mild swelling and erythema L foot   NEUROLOGY: CN II-XII grossly intact, moving all extremities  SKIN: No rashes or lesions      LABS:  CAPILLARY BLOOD GLUCOSE

## 2019-05-30 NOTE — ED ADULT NURSE NOTE - OBJECTIVE STATEMENT
PT is a 54 year old female reporting to the ED for left arm swelling. Pt was d/c today with left mid arm port to receive ABX after left foot surgery. Pt has left foot surgery on may 21 for osteomyelitis. Pt Pt left foot remains swollen, pt reports pain 6 out of 10. Pt reports no increase in left foot swelling or pain since d/c. Pt is reporting to ED by home nurse. As per pt midline had no blood return. Pt left upper arm is swollen and ecchymosis. PT has pmh of CVA, with left arm residual weakness and decreased sensation, therefore pt denies pain at site. Pt is AOx4. Pt denies chest pain or SOB. Pt rr even and unlabored, spo2 97 % on room air. Pt reports she received one dose of prescribed ABX this morning. Pt denies fever, chills, n/v/d. Pt has + movement, sensation in left lower extremity. Pt awaiting MD garcia, will continue to monitor.

## 2019-05-31 LAB
SPECIMEN SOURCE: SIGNIFICANT CHANGE UP
SPECIMEN SOURCE: SIGNIFICANT CHANGE UP

## 2019-05-31 PROCEDURE — 99225: CPT

## 2019-05-31 PROCEDURE — 76937 US GUIDE VASCULAR ACCESS: CPT | Mod: 26

## 2019-05-31 PROCEDURE — 36410 VNPNXR 3YR/> PHY/QHP DX/THER: CPT

## 2019-05-31 PROCEDURE — 99243 OFF/OP CNSLTJ NEW/EST LOW 30: CPT

## 2019-05-31 RX ORDER — AMITRIPTYLINE HCL 25 MG
25 TABLET ORAL ONCE
Refills: 0 | Status: COMPLETED | OUTPATIENT
Start: 2019-05-31 | End: 2019-05-31

## 2019-05-31 RX ORDER — QUETIAPINE FUMARATE 200 MG/1
200 TABLET, FILM COATED ORAL ONCE
Refills: 0 | Status: COMPLETED | OUTPATIENT
Start: 2019-05-31 | End: 2019-05-31

## 2019-05-31 RX ADMIN — Medication 100 MILLIGRAM(S): at 21:59

## 2019-05-31 RX ADMIN — QUETIAPINE FUMARATE 200 MILLIGRAM(S): 200 TABLET, FILM COATED ORAL at 00:23

## 2019-05-31 RX ADMIN — Medication 25 MILLIGRAM(S): at 00:23

## 2019-05-31 RX ADMIN — Medication 2000 MILLIGRAM(S): at 13:35

## 2019-05-31 RX ADMIN — Medication 25 MILLIGRAM(S): at 23:00

## 2019-05-31 RX ADMIN — QUETIAPINE FUMARATE 200 MILLIGRAM(S): 200 TABLET, FILM COATED ORAL at 23:01

## 2019-05-31 RX ADMIN — Medication 100 MILLIGRAM(S): at 13:00

## 2019-05-31 RX ADMIN — Medication 100 MILLIGRAM(S): at 05:32

## 2019-05-31 NOTE — CONSULT NOTE ADULT - ASSESSMENT
53F hx osteoarthritis, treated NHL s/p chemo/RT in remission 2005, hx cdiff recent left ankle mass removal 3/22 p/w L foot/ankle edema and erythema.  MRI results noted.  Since 2017, there is evidence of synovial abnormality after stem cell injection (6/2017).  Recent MRI with septic arthritis and OM - likely chronic given duration of her illness. Hx Cdiff.  Abnl MRI of the left ankle at least since 11/2017.  s/p OR for L ankle exploration, arthrotomy, I&D and vac placement.  Malfunction of midline, however, it does not appear infected.    Suggest:  - for transition to PICC  - continue ancef 2g IV q8 until 6/12/19  - please reinstate home infusion services once PICC is in place  - weekly - cbc, cmp, esr, crp - fax results to [phone]  - outpatient ID f/u - please call [phone] - scheduled for 6/13/19  - d/c planning once PICC is in    Please call the ID service [phone] with questions or concerns over the weekend

## 2019-05-31 NOTE — PROVIDER CONTACT NOTE (OTHER) - ASSESSMENT
CDU ZACHARY Walter contacted  regarding d/c planning. Patient is known to [facility] for IV Abx Q8hrs. Pt got IV PICC line placed due to malfunctioning Midline. [facility] ESSIE Young [phone] received PICC line placement documentation via Fax ([phone]) and states [facility] will come for SOC tomorrow 6/1/2019 at 9am. MD made aware. Plan is to give evening dose in hospital tonight prior to d/c. CM remains available.

## 2019-05-31 NOTE — ED PROCEDURE NOTE - PROCEDURE ADDITIONAL DETAILS
ZACHARY Han: IR team requesting pre-procedure note. Pt requires PICC line for continued IV abx until 6/12.

## 2019-05-31 NOTE — CONSULT NOTE ADULT - SUBJECTIVE AND OBJECTIVE BOX
HPI:  53F hx arthritis, NHL s/p chemo/RT in remission .  Notes in 2017 has ?stem cells injected into L ankle and since then has had this problem.  Since , there is evidence of synovial abnormality after stem cell injection (2017).  Recent MRI with septic arthritis and OM - likely chronic given duration of her illness. Hx Cdiff.  Abnl MRI of the left ankle at least since 2017.  Recently, 3/22/19 noted to have ankle mass that was removed and was discharged with keflex x 1 week.  Intra-op cultures with MSSA.  Was seen by Dr. Richter (at [place]).  Was given doxycycline for 4 weeks.  She completed this on 19.  By 5/10/19 - she noted her left foot/ankle had more erythema, swelling and pain.      Went to ER - 19 - seen by ortho and did not feel septic joint but likely a cellulitis.  She was discharged from ER on oral keflex.  Presented again because she did not feel better.  19 - she is s/p OR for L ankle exploration, arthrotomy, I&D and vac placement.  She was started on ancef.  Midline was placed.  She was discharged 19, however, now returns with malfunction of midline.      prior hospital charts reviewed [ x ]  primary team notes reviewed [  x]  other consultant notes reviewed [ x ]    PAST MEDICAL & SURGICAL HISTORY:  Acquired hypothyroidism  History of palpitations  Gastritis  Arthritis  Cholelithiases  Fatty liver  Hodgkins lymphoma:   Cerebrovascular accident (CVA):   H/O eye disorder: &quot;denervation of nerve in eye, on drops to prevenyt glaucoma, the pressure is not high&quot;  Depression  History of seizure: x1-2007  H/O lymph node biopsy: &quot;mediastinal&quot;-2005  History of appendectomy: as a child  History of tonsillectomy: as a child    Allergies  penicillin (Rash) but no problem with cephalosporins    ANTIMICROBIALS:    ancef    OTHER MEDS: MEDICATIONS  (STANDING):  amitriptyline 25 at bedtime  QUEtiapine 100 at bedtime    SOCIAL HISTORY:   does not smoke; on disability    FAMILY HISTORY:  Family history of TIAs: father   FH: diabetes mellitus: father   FH: HTN (hypertension): father , mother 81 yo    REVIEW OF SYSTEMS  [  ] ROS unobtainable because:    [ x ] All other systems negative except as noted below:	    Constitutional:  [ ] fever [ ] chills  [ ] weight loss  [ ] weakness  Skin:  [ ] rash [ ] phlebitis	  Eyes: [ ] icterus [ ] pain  [ ] discharge	  ENMT: [ ] sore throat  [ ] thrush [ ] ulcers [ ] exudates  Respiratory: [ ] dyspnea [ ] hemoptysis [ ] cough [ ] sputum	  Cardiovascular:  [ ] chest pain [ ] palpitations [ ] edema	  Gastrointestinal:  [ ] nausea [ ] vomiting [ ] diarrhea [ ] constipation [ ] pain	  Genitourinary:  [ ] dysuria [ ] frequency [ ] hematuria [ ] discharge [ ] flank pain  [ ] incontinence  Musculoskeletal:  [ x ] L foot edema and wound. limited ROM no redness   Neurological:  [ ] headache [ ] seizures  [ ] confusion/altered mental status  Psychiatric:  [ ] anxiety [ ] depression	  Hematology/Lymphatics:  [ ] lymphadenopathy  Endocrine:  [ ] adrenal [ ] thyroid  Allergic/Immunologic:	 [ ] transplant [ ] seasonal    Vital Signs Last 24 Hrs  T(F): 97.8 (19 @ 13:09), Max: 98.5 (19 @ 19:20)  HR: 79 (19 @ 13:09)  BP: 100/56 (19 @ 13:09)  RR: 16 (19 @ 13:09)  SpO2: 100% (19 @ 13:09) (97% - 100%)  Wt(kg): --    PHYSICAL EXAM:  General: non-toxic  HEAD/EYES: anicteric  ENT:  supple  Cardiovascular:   S1, S2  Respiratory:  clear bilaterally  GI:  soft, non-tender, normal bowel sounds  :  no CVA tenderness   Musculoskeletal:  L ankle with wound laterally; residual sutures; no pus; limited ROM; no cellulitis  Neurologic:  grossly non-focal  Skin:  no rash  Lymph: no lymphadenopathy  Psychiatric:  appropriate affect  Vascular:  L midline; area is ecchymotic; no phlebitis                        12.4   7.38  )-----------( 345      ( 30 May 2019 21:50 )             37.4     139  |  101  |  19  ----------------------------<  95  4.4   |  24  |  0.61    Ca    9.9      30 May 2019 21:50    TPro  7.4  /  Alb  4.2  /  TBili  0.4  /  DBili  x   /  AST  32  /  ALT  11  /  AlkPhos  98  -    Sedimentation Rate, Erythrocyte: 23 (19)  Sedimentation Rate, Erythrocyte: 23 (19)  Sedimentation Rate, Erythrocyte: 23 (19)  Sedimentation Rate, Erythrocyte: 20 (19)  Sedimentation Rate, Erythrocyte: 7 (19)    C-Reactive Protein, Serum: 6.3 (19)  C-Reactive Protein, Serum: 8.2 (19)  C-Reactive Protein, Serum: 14.4 (19)    MICROBIOLOGY:  Surgical Pathology Report:   Final Diagnosis  1. Soft tissue mass, left ankle, excision:   - Fibroconnective tissue with degenerated mucin-like material and focal foreign body type giant cell reaction.  2. Soft tissue mass, left ankle, excision:  - Fibroconnective tissue with foci of necrosis, degenerated mucin-like material, granulomatous inflammation, and focal foreign body type giant cell reaction.  - Negative for Fungal organisms (GMS stain).  - AFB stain to be reported as addendum.  (19 @ 20:03)    MICROBIOLOGY:  Culture - Surg Site Aerob/Anaer w/Gm St (19 @ 10:13)    Gram Stain Wound:   NOS^No Organisms Seen  WBC^White Blood Cells  QNTY CELLS IN GRAM STAIN: FEW (2+)    Culture - Surgical Site:   NO GROWTH - PRELIMINARY RESULTS    Specimen Source: ANKLE    Culture - Surg Site Aerob/Anaer w/Gm St (19 @ 10:09)    Gram Stain Wound:   BLOODY SP.  NOS^No Organisms Seen  WBC^White Blood Cells  QNTY CELLS IN GRAM STAIN: RARE (1+)    Culture - Surgical Site:   NO GROWTH - PRELIMINARY RESULTS    Specimen Source: ANKLE    Culture - Fungal, Blood (19 @ 15:01)    Culture - Fungal, Blood:   CULTURE NEGATIVE FOR YEASTS AND MOLDS-PRELIMINARY RESULT  AFTER 24 HOURS INCUBATION    Specimen Source: BLOOD    Culture - Blood (19 @ 23:18)    Culture - Blood:   NO ORGANISMS ISOLATED    Specimen Source: BLOOD VENOUS    Culture - Yeast and Fungus (19 @ 20:24)  CULTURE NEGATIVE FOR YEASTS AND MOLDS   AFTER 4 WEEKS    Specimen Source: OTHER    Culture - Surg Site Aerob/Anaer w/Gm St (19 @ 20:24)    Gram Stain Wound:   NOS No Organisms Seen  Culture - Blood (19 @ 23:18)    Culture - Blood:   NO ORGANISMS ISOLATED    Specimen Source: BLOOD PERIPHERAL    WBC^White Blood Cells  QNTY CELLS IN GRAM STAIN: RARE (1+)    -  Cefazolin: S <=4 JOSE    -  Ciprofloxacin: S <=1 JOSE    -  Clindamycin: S    -  Erythromycin: S    -  Gentamicin: S <=1 JOSE    -  Levofloxacin: S <=0.5 JOSE    -  Moxifloxacin(Aerobic): S <=0.5 JOSE    -  Oxacillin: S <=0.25 JOSE    -  Rifampin: S <=1 JOSE    -  Tetra/Doxy: S <=1 JOSE    -  Trimethoprim/Sulfamethoxazole: S <=0.5/9.5 JOSE    -  Vancomycin: S 1 JOSE    Culture - Surgical Site:   RARE    Specimen Source: OTHER    Organism Identification: Staphylococcus aureus    RADIOLOGY:  imaging below personally reviewed    Xray Ankle 2 Views, Left (19 @ 21:30) >  IMPRESSION:  Soft tissue swelling of the left ankle. Small joint effusion. No radiographic evidence of osteomyelitis. No acute fracture.    MR Ankle w/ IV Cont, Left (19 @ 19:55) >  Impression: 5.8 x 2.2x 1.3 cm abscess anterior laterally at the level of the ankle. Large tibiotalar joint effusion with marked synovitis and marrow signal changes within the talus, distal tibia and distal fibula suggestive of septic arthritis/osteomyelitis. Area of nonenhancement within the lateral talus suspicious for ischemia or infarction.    19 - MRI left ankle - non-specific diffuse masslike synovial thickening of the tibiotalar joint presumably herniating through the syndesmosis and extending towards the anterolateral aspect of the ankle, corresponding to the area of palpable abnormality.  Findings are concerning for a synovial process.  Possibilities include PVNS (pigmented villonodular synovitis), inflammatory arthropathy, gout, and other etiologies.    17 - MRI left ankle - finding of synovitis with fluid and synovial thickening at the tibiotalar joint. subchondral erosions are seen at the tip of the fibula and at the base of the second metatarsal.  The findings are overall non-specific although could be seen with rheumatoid arthritis.              RADIOLOGY:  imaging below personally reviewed

## 2019-05-31 NOTE — ED CDU PROVIDER SUBSEQUENT DAY NOTE - PROGRESS NOTE DETAILS
Pt NAD, VSS, no acute complaints. Spoke with Procedure team - capped for procedures today - unable to perform midline IV - recommended to speak with RN Marisa for extended peripheral IV at [phone] - pager not in hospital and there are no other numbers to reach her (unknown whether she is scheduled to work a shift today). Spoke with ID recommending PICC line - repeatedly attempted to get in touch with IR - did not return my page - spoke with IR nurse who recommend that I speak to PA who is in charge of scheduling at [phone] or [phone] - I spoke to PA  unsure whether he will be able to fit her onto today's schedule - if unable the patient would be put on next weeks schedule and require admission. Spoke with  - home nurse will be available tomorrow am at 09:00. Will keep the patient for 2 more doses of IV abx. Will pull midline IV prior to discharge. Received sign out from ZACHARY Han that patient had new midline placed earlier today by IR. Ready for discharge however since visiting nurse is not available for infusions until tomorrow at 9 am, given that pt to stay in the CDU for her doses of abx and dc in the am. pt stable, sitting up talking on phone, no complaints at this time.

## 2019-06-01 VITALS
RESPIRATION RATE: 18 BRPM | TEMPERATURE: 98 F | OXYGEN SATURATION: 98 % | DIASTOLIC BLOOD PRESSURE: 66 MMHG | HEART RATE: 78 BPM | SYSTOLIC BLOOD PRESSURE: 104 MMHG

## 2019-06-01 PROCEDURE — 99217: CPT

## 2019-06-01 RX ADMIN — Medication 100 MILLIGRAM(S): at 05:31

## 2019-06-01 NOTE — ED CDU PROVIDER DISPOSITION NOTE - NSFOLLOWUPINSTRUCTIONS_ED_ALL_ED_FT
Rest, drink plenty of fluids.  Advance activity as tolerated.  Continue all previously prescribed medications as directed.  Follow up with your primary care physician in 48-72 hours- bring copies of your results.  Return to the ER for worsening or persistent symptoms, and/or ANY NEW OR CONCERNING SYMPTOMS. If you have issues obtaining follow up, please call: [phone] to obtain a doctor or specialist who takes your insurance in your area.

## 2019-06-01 NOTE — ED CDU PROVIDER SUBSEQUENT DAY NOTE - PSH
H/O lymph node biopsy  "mediastinal"-2005  History of appendectomy  as a child  History of tonsillectomy  as a child
H/O lymph node biopsy  "mediastinal"-2005  History of appendectomy  as a child  History of tonsillectomy  as a child

## 2019-06-01 NOTE — ED CDU PROVIDER DISPOSITION NOTE - ATTENDING CONTRIBUTION TO CARE
I, Jennifer Cabot, MD, have performed a history and physical exam of the patient and discussed their management with the ACP.  I reviewed the PA's note and agree with the documented findings and plan of care. My medical decision making and observations are found above.    Cabot: 54F with PMH of CVA, seizures, hodgkin's lymphoma, hypothyroid, depression, here with malfunctioning midline.  Had new midline placed, functioning well. Prior midline removed.  Received abx while in the CDU.  On exam, + R midline, dressing c/d/i, L midline removed, radial pulses 2+ bilat, CTAB, RRR, abd benign, LEs without edema, + dressing to ankle.  Will discharge home.

## 2019-06-01 NOTE — ED CDU PROVIDER SUBSEQUENT DAY NOTE - PROGRESS NOTE DETAILS
Cabot: 54F with PMH of CVA, seizures, hodgkin's lymphoma, hypothyroid, depression, here with malfunctioning midline.  Had new midline placed, functioning well. Prior midline removed.  Received abx while in the CDU.  On exam, + R midline, dressing c/d/i, L midline removed, radial pulses 2+ bilat, CTAB, RRR, abd benign, LEs without edema, + dressing to ankle.  Will discharge home. Patient signed out to me to f/u. Patient reassessed, sitting comfortably in bed in NAD, denies any complaints. States feeling better, symptoms improved. The patient was given verbal and written discharge instructions. Specifically, instructions when to return to the ED and when to seek follow-up from their pcp was discussed. Any specialty follow-up was discussed, including how to make an appointment.  Instructions were discussed in simple, plain language and was understood by the patient. The patient understands that should their symptoms worsen or any new symptoms arise, they should return to the ED immediately for further evaluation. All pt's questions were answered. Patient verbalizes understanding.

## 2019-06-01 NOTE — ED CDU PROVIDER SUBSEQUENT DAY NOTE - ATTENDING CONTRIBUTION TO CARE
I, Jennifer Cabot, MD, have performed a history and physical exam of the patient and discussed their management with the PA.  I reviewed the PA's note and agree with the documented findings and plan of care. My medical decision making and observations are found above.    Cabot: 54F with PMH of CVA, seizures, hodgkin's lymphoma, hypothyroid, depression, here with malfunctioning midline.  Had new midline placed, functioning well. Prior midline removed.  Received abx while in the CDU.  On exam, + R midline, dressing c/d/i, L midline removed, radial pulses 2+ bilat, CTAB, RRR, abd benign, LEs without edema, + dressing to ankle.  Will discharge home.
I performed a history and physical exam of the patient and discussed their management with the advanced care provider. I reviewed the advanced care provider's note and agree with the documented findings and plan of care. My medical decision making and objective findings are found above.

## 2019-06-01 NOTE — ED CDU PROVIDER SUBSEQUENT DAY NOTE - RESPIRATORY NEGATIVE STATEMENT, MLM
no chest pain, no cough, and no shortness of breath.
no chest pain, no cough, and no shortness of breath.

## 2019-06-01 NOTE — ED CDU PROVIDER SUBSEQUENT DAY NOTE - PHYSICAL EXAMINATION
L medial upper extremity with ecchymosis around site of prior midline, no erythema, tenderness arm FROM  New midline on R upper extremity with no tenderness or surrounding erythema.  Left foot bandage with out drainage

## 2019-06-01 NOTE — ED CDU PROVIDER SUBSEQUENT DAY NOTE - MEDICAL DECISION MAKING DETAILS
54 y.o female with PMHx of CVA, seizures, hodgkin's lymphoma, hypothyroid, depression sent to CDU for  malfunctioning midline IV. Was seen by IR who placed new midline. After discussion with  visiting nurse would not be able to give her abx infusions until 6/1. Pt kept in CDU in order to receive necessary doses, to be discharged in the am.
54 y.o female with PMHx of CVA, seizures, hodgkin's lymphoma, hypothyroid, depression pw malfunctioning midline IV.   Plan: procedure team IV replacement

## 2019-06-01 NOTE — ED CDU PROVIDER SUBSEQUENT DAY NOTE - PMH
Acquired hypothyroidism  "from radiation", pt. 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
Acquired hypothyroidism  "from radiation", pt. 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

## 2019-06-01 NOTE — ED CDU PROVIDER DISPOSITION NOTE - CLINICAL COURSE
Cabot: 54F with PMH of CVA, seizures, hodgkin's lymphoma, hypothyroid, depression, here with malfunctioning midline.  Had new midline placed, functioning well. Prior midline removed.  Received abx while in the CDU.  On exam, + R midline, dressing c/d/i, L midline removed, radial pulses 2+ bilat, CTAB, RRR, abd benign, LEs without edema, + dressing to ankle.  Will discharge home.

## 2019-06-01 NOTE — ED CDU PROVIDER SUBSEQUENT DAY NOTE - GASTROINTESTINAL NEGATIVE STATEMENT, MLM
no abdominal pain, no bloating, no constipation, no diarrhea, no nausea and no vomiting.
no abdominal pain, no bloating, no constipation, no diarrhea, no nausea and no vomiting.

## 2019-06-01 NOTE — ED CDU PROVIDER SUBSEQUENT DAY NOTE - HISTORY
54 y.o female with PMHx of CVA, seizures, hodgkin's lymphoma, hypothyroid, depression sent to CDU for  malfunctioning midline IV. Was seen by IR who placed new midline. After discussion with  visiting nurse would not be able to give her abx infusions until 6/1. Pt kept in CDU in order to receive necessary doses, to be discharged in the am. Pt has had no complaints. Midline cleared by IR to be accessed- infusing well. Denies fevers, chills, chest pain, sob, arm pain, n/v/d, abdominal pain.
54 y.o female with PMHx of CVA, seizures, hodgkin's lymphoma, hypothyroid, depression pw malfunctioning midline IV. Pt had abscess removal 3/19 which was complicated by osteo/septic arthritis s/p surgical exploration on 5/21/19 and dc'ed with midline IV for ancef infusions. Yesterday home nurse was unable to draw back on line and sent to the ED for evaluation. Pt sent ot the CDU for procedure team replacement of midline IV.

## 2019-06-04 ENCOUNTER — APPOINTMENT (OUTPATIENT)
Dept: ORTHOPEDIC SURGERY | Facility: CLINIC | Age: 54
End: 2019-06-04
Payer: COMMERCIAL

## 2019-06-04 LAB
BACTERIA BLD CULT: SIGNIFICANT CHANGE UP
BACTERIA BLD CULT: SIGNIFICANT CHANGE UP

## 2019-06-04 PROCEDURE — 99024 POSTOP FOLLOW-UP VISIT: CPT

## 2019-06-04 NOTE — HISTORY OF PRESENT ILLNESS
[de-identified] : Patient with a recent history of septic infected left ankle joint with a large abscess 2 weeks ago patient underwent a second exploration debridement irrigation arthrotomy of the left ankle joint and insertion of a Hemovac and insertion of VAC suction system patient on IV antibiotic. At this stage surgical wound seems to be quite good no discharge mild swelling surrounding the ankle joint. Patient was instructed to continue IV antibiotic keep leg elevated and to keep change of dressing. However the risk of a possible father future recurrence of the infection and the need for microsurgical procedures could not be ruled out [de-identified] : Left ankle

## 2019-06-05 ENCOUNTER — EMERGENCY (EMERGENCY)
Facility: HOSPITAL | Age: 54
LOS: 1 days | Discharge: HOME | End: 2019-06-05
Attending: EMERGENCY MEDICINE | Admitting: EMERGENCY MEDICINE
Payer: COMMERCIAL

## 2019-06-05 VITALS
RESPIRATION RATE: 16 BRPM | TEMPERATURE: 98 F | HEART RATE: 110 BPM | SYSTOLIC BLOOD PRESSURE: 121 MMHG | OXYGEN SATURATION: 100 % | DIASTOLIC BLOOD PRESSURE: 97 MMHG

## 2019-06-05 DIAGNOSIS — Z90.49 ACQUIRED ABSENCE OF OTHER SPECIFIED PARTS OF DIGESTIVE TRACT: Chronic | ICD-10-CM

## 2019-06-05 DIAGNOSIS — Z90.89 ACQUIRED ABSENCE OF OTHER ORGANS: Chronic | ICD-10-CM

## 2019-06-05 DIAGNOSIS — Z98.890 OTHER SPECIFIED POSTPROCEDURAL STATES: Chronic | ICD-10-CM

## 2019-06-05 PROCEDURE — 99283 EMERGENCY DEPT VISIT LOW MDM: CPT

## 2019-06-05 NOTE — ED ADULT NURSE NOTE - NSIMPLEMENTINTERV_GEN_ALL_ED
Implemented All Universal Safety Interventions:  [place] to call system. Call bell, personal items and telephone within reach. Instruct patient to call for assistance. Room bathroom lighting operational. Non-slip footwear when patient is off stretcher. Physically safe environment: no spills, clutter or unnecessary equipment. Stretcher in lowest position, wheels locked, appropriate side rails in place.

## 2019-06-05 NOTE — ED ADULT TRIAGE NOTE - CHIEF COMPLAINT QUOTE
Pt c/o lack of blood return from right arm picc line, pt getting IV antibiotics for left foot osteomyelitis. Placed last Friday. Pt denies pain to site.

## 2019-06-05 NOTE — ED ADULT NURSE NOTE - OBJECTIVE STATEMENT
Pt received to room 22 a/o x 3 c/o unable to get blood return from Right arm PICC line. Pt had PICC line placed 5/31/19 for IV antibiotic for staph and osteomyelitis of left foot. Pt home aid wasn't able to get blood from PICC line but was able to flush properly.  Pt left foot noted to be swollen. pt denies any pain to the site. no redness, swelling or purulent drainage noted to site. pt denies any medical complaints. Respirations even and unlabored. Lung sounds clear with equal chest rise bilaterally. ABD is soft, non tender, non distended with normal active bowel sounds. No complaints of chest pain, headache, nausea, dizziness, vomiting  SOB, fever, chills verbalized.

## 2019-06-05 NOTE — ED CDU PROVIDER SUBSEQUENT DAY NOTE - NS ED MD PROGRESS NOTE PROVIDER NAME3 FT
Discharge Instructions for:  Flex Sig / Colonoscopy  Activity  • Due to the medication you received during your Flexible Signmoidoscopy or Colonscopy, do not drive a motor vehicle, operate machinery or appliances, make important decisions, sign legal documents, or drink alcohol for 24 hours.    • Because of the medication you received, you may or may not remember your procedure or the doctor’s explanation of what your examination showed.    What to Expect  • You may feel abdominal fullness and cramping.  This is normal and is caused by air used during the procedure to inflate the stomach and colon. This can be relieved with walking around, lying on your left side with knees bent and passing gas.  • Do not use laxatives or enemas for one week following your procedure.        - If you were taking any previously you may resume them when you feel necessary; however, you may not need to due to the prep for the colonoscopy.  • You may notice a small amount of blood on your toilet paper, a little red is okay and can discolor the toilet bowl to a pink/light-reddish color.  If you have more than 1 or 2 times of chunks or clots of blood in the toilet, call the office.  • It may take a few days for you to return to your normal bowel habits.    Diet  · Resume your regular diet. Follow the high fiber diet instructions. If you are  nauseated or have abdominal pain:  continue with clear liquids and progress slowly to your regular diet.    Managing nausea     Some people have an upset stomach after procedures. This is often because of anesthesia, pain, or pain medicine, or the stress of surgery. These tips will help you handle nausea and eat healthy foods as you get better. If you were on a special food plan before surgery, ask your doctor if you should follow it while you get better. These tips may help:  Do not push yourself to eat. Your body will tell you when to eat and how much.  Start off with clear liquids and soup. They are  easier to digest.  Next try semi-solid foods, such as mashed potatoes, applesauce, and gelatin, as you feel ready.  Slowly move to solid foods. Don’t eat fatty, rich, or spicy foods at first.  Do not force yourself to have 3 large meals a day. Instead eat smaller amounts more often.  Take pain medicines with a small amount of solid food, such as crackers or toast, to avoid nausea.  Special Instructions     • During your procedure you had:    [] Biopsy(ies) taken.    [x] Polyp(s) removed.    [] No tissue removed. Follow up as needed.    •  If you had specimens removed, they have been sent to Pathology. The specimen results will be:     [] Called to you within the next week.    [x] Discussed with you at your post op visit.     Call your doctor if any of the following problems occur.  Dr. Bland's office number is: [phone]:   • Severe pain in abdomen.  • Ongoing or worsening chest pain or shortness of breath  • Excessive nausea and/or vomiting  • Abdominal bloating not relieved by passing gas or inability to pass gas  • New or increased bleeding from rectum.  • Black tarry stools  • Temperature of 101.?F  or above     You can greatly reduce your risk of colon polyps and colorectal cancer by:           1. Having regular screenings.          2. Eating fruits, vegetables, and whole grains.          3. Reducing your fat intake.           4. Limiting your alcohol consumption.           5. Not using tobacco.            6. Staying physically active and maintaining a healthy body weight.           7. Reducing red meat consumption.             ZACHARY Han

## 2019-06-06 VITALS
OXYGEN SATURATION: 100 % | DIASTOLIC BLOOD PRESSURE: 86 MMHG | RESPIRATION RATE: 16 BRPM | SYSTOLIC BLOOD PRESSURE: 125 MMHG | HEART RATE: 93 BPM | TEMPERATURE: 98 F

## 2019-06-06 PROCEDURE — 71046 X-RAY EXAM CHEST 2 VIEWS: CPT | Mod: 26

## 2019-06-06 RX ORDER — CEFAZOLIN SODIUM 1 G
2000 VIAL (EA) INJECTION ONCE
Refills: 0 | Status: DISCONTINUED | OUTPATIENT
Start: 2019-06-06 | End: 2019-06-09

## 2019-06-06 RX ORDER — ALTEPLASE 100 MG
2 KIT INTRAVENOUS ONCE
Refills: 0 | Status: DISCONTINUED | OUTPATIENT
Start: 2019-06-06 | End: 2019-06-09

## 2019-06-06 NOTE — ED PROVIDER NOTE - OBJECTIVE STATEMENT
54F with PMH of CVA, seizures, hodgkin's lymphoma, hypothyroid, depression presenting with concern for malfunctioning PICC line. Pt states her home nurse was unable to draw blood off the line this afternoon. Pt denies any symptoms at present, PICC line is in place. Denies arm pain or swelling, fever/chills, chest pain, sob, abd pain, n/v/d, headache, dizziness or any other concerns.

## 2019-06-06 NOTE — ED PROVIDER NOTE - CLINICAL SUMMARY MEDICAL DECISION MAKING FREE TEXT BOX
54F with PMH of CVA, seizures, hodgkin's lymphoma, hypothyroid, depression presenting with concern for malfunctioning PICC line. Unable to draw off the PICC, will confirm placement and trouble shoot.

## 2019-06-06 NOTE — ED PROVIDER NOTE - MUSCULOSKELETAL, MLM
Spine appears normal, range of motion is not limited, no muscle or joint tenderness, PICC in right arm, no erythema, no infiltration.

## 2019-06-06 NOTE — ED PROVIDER NOTE - NSFOLLOWUPINSTRUCTIONS_ED_ALL_ED_FT
Follow up with your primary care provider within 2-3 days  Continue taking all medications as previously prescribed to you  Return to the ER with any worsening or concerning symptoms, arm pain or swelling, fever/chills, weakness or any other concerns.

## 2019-06-06 NOTE — ED PROVIDER NOTE - PROGRESS NOTE DETAILS
ZACHARY Hernandez: following cathflo, PICC line flushes, unable to draw off the line ZACHARY Hernandez: Changed saline lock and was able to draw blood off the line, will provide pt with replacement saline lock in size she came with and d/c home

## 2019-06-11 ENCOUNTER — APPOINTMENT (OUTPATIENT)
Dept: ORTHOPEDIC SURGERY | Facility: CLINIC | Age: 54
End: 2019-06-11
Payer: COMMERCIAL

## 2019-06-11 ENCOUNTER — APPOINTMENT (OUTPATIENT)
Dept: WOUND CARE | Facility: CLINIC | Age: 54
End: 2019-06-11
Payer: COMMERCIAL

## 2019-06-11 VITALS
BODY MASS INDEX: 22.16 KG/M2 | DIASTOLIC BLOOD PRESSURE: 64 MMHG | SYSTOLIC BLOOD PRESSURE: 99 MMHG | HEIGHT: 65 IN | TEMPERATURE: 97.8 F | WEIGHT: 133 LBS | HEART RATE: 89 BPM

## 2019-06-11 DIAGNOSIS — G89.29 OTHER CHRONIC PAIN: ICD-10-CM

## 2019-06-11 DIAGNOSIS — I63.9 CEREBRAL INFARCTION, UNSPECIFIED: ICD-10-CM

## 2019-06-11 PROCEDURE — 99203 OFFICE O/P NEW LOW 30 MIN: CPT

## 2019-06-11 PROCEDURE — 99024 POSTOP FOLLOW-UP VISIT: CPT

## 2019-06-11 RX ORDER — COLLAGENASE SANTYL 250 [ARB'U]/G
250 OINTMENT TOPICAL DAILY
Qty: 1 | Refills: 3 | Status: ACTIVE | COMMUNITY
Start: 2019-06-11 | End: 1900-01-01

## 2019-06-11 NOTE — ASSESSMENT
[FreeTextEntry1] : --spent 30 min in initial consultation with patient\par --reviewed prior history in full detail\par --rx for wound care supplies to  [employer]\par --rx santyl to apply daily\par --patient to f/u one week for debridemont and evaluation and consider repeat ankle xray

## 2019-06-11 NOTE — ASSESSMENT
[FreeTextEntry1] : --spent 30 min in initial consultation with patient\par --reviewed prior history in full detail\par --rx for wound care supplies to  [facility]\par --rx santyl to apply daily\par --patient to f/u one week for debridemont and evaluation and consider repeat ankle xray

## 2019-06-11 NOTE — REASON FOR VISIT
[Consultation] : a consultation visit [FreeTextEntry1] : Patient presents for second opinion of a left ankle ulcer sent by surgeon due to chronic nature and delayed healing, patient has been using hydrogen peroxide. Patient states she had abcess in ankle due to trauma and had it drained and is on IVabx and going to see ID doctor in two days to possibly d/c.

## 2019-06-11 NOTE — HISTORY OF PRESENT ILLNESS
[de-identified] : Patient was seen today in followup post septic infected left ankle joint with soft tissue abscess this was triggered by exploration drainage irrigation debridement and insertion o VAC suction drainage, or dysphagia patient being treated with IV antibiotic surgical wound healing nice swelling diminished  [de-identified] : On examination I am swelling and redness subsided as this small opening wound from the Vac suction drainage. No redness no discharge or dysphagia patient also was recommended to be seen by a day comprehensive wound care service for further evaluation

## 2019-06-11 NOTE — PHYSICAL EXAM
[4 x 4] : 4 x 4  [de-identified] : santyl  [Ankle Swelling (On Exam)] : present [1+] : left 1+ [Ankle Swelling On The Left] : of the left ankle [Ankle Swelling On The Right] : mild [Varicose Veins Of Lower Extremities] : bilaterally [] : bilaterally [de-identified] : all epicritic sensations grossly intact left lower extremity [de-identified] : no clinical signs of cellulitis [FreeTextEntry1] : dorsal lateral ankle [FreeTextEntry3] : 1cm  [FreeTextEntry2] : 3cm  [FreeTextEntry4] : 3mm

## 2019-06-11 NOTE — PHYSICAL EXAM
[4 x 4] : 4 x 4  [de-identified] : santyl  [Ankle Swelling (On Exam)] : present [1+] : left 1+ [Ankle Swelling On The Left] : of the left ankle [Ankle Swelling On The Right] : mild [Varicose Veins Of Lower Extremities] : bilaterally [] : bilaterally [de-identified] : all epicritic sensations grossly intact left lower extremity [FreeTextEntry1] : dorsal lateral ankle [de-identified] : no clinical signs of cellulitis [FreeTextEntry3] : 1cm  [FreeTextEntry2] : 3cm  [FreeTextEntry4] : 3mm

## 2019-06-13 ENCOUNTER — APPOINTMENT (OUTPATIENT)
Dept: INFECTIOUS DISEASE | Facility: CLINIC | Age: 54
End: 2019-06-13
Payer: COMMERCIAL

## 2019-06-13 VITALS
DIASTOLIC BLOOD PRESSURE: 81 MMHG | WEIGHT: 144 LBS | OXYGEN SATURATION: 100 % | BODY MASS INDEX: 23.96 KG/M2 | SYSTOLIC BLOOD PRESSURE: 125 MMHG | HEART RATE: 87 BPM | TEMPERATURE: 97.1 F

## 2019-06-13 PROCEDURE — 99214 OFFICE O/P EST MOD 30 MIN: CPT

## 2019-06-13 RX ORDER — QUETIAPINE 400 MG/1
TABLET, FILM COATED ORAL
Refills: 0 | Status: ACTIVE | COMMUNITY

## 2019-06-13 RX ORDER — CEPHALEXIN 500 MG/1
500 TABLET ORAL 3 TIMES DAILY
Qty: 42 | Refills: 0 | Status: ACTIVE | COMMUNITY
Start: 2019-06-13 | End: 1900-01-01

## 2019-06-18 ENCOUNTER — CHART COPY (OUTPATIENT)
Age: 54
End: 2019-06-18

## 2019-06-20 LAB
FUNGUS SPEC QL CULT: SIGNIFICANT CHANGE UP
FUNGUS SPEC QL CULT: SIGNIFICANT CHANGE UP

## 2019-06-25 LAB — FUNGUS BLD CULT: SIGNIFICANT CHANGE UP

## 2019-07-01 LAB — MYCOBACTERIUM BLD CULT: SIGNIFICANT CHANGE UP

## 2019-07-02 ENCOUNTER — APPOINTMENT (OUTPATIENT)
Dept: ORTHOPEDIC SURGERY | Facility: CLINIC | Age: 54
End: 2019-07-02
Payer: COMMERCIAL

## 2019-07-02 ENCOUNTER — APPOINTMENT (OUTPATIENT)
Dept: WOUND CARE | Facility: CLINIC | Age: 54
End: 2019-07-02
Payer: COMMERCIAL

## 2019-07-02 VITALS
HEART RATE: 111 BPM | BODY MASS INDEX: 23.99 KG/M2 | DIASTOLIC BLOOD PRESSURE: 69 MMHG | SYSTOLIC BLOOD PRESSURE: 110 MMHG | HEIGHT: 65 IN | WEIGHT: 144 LBS

## 2019-07-02 PROCEDURE — 11042 DBRDMT SUBQ TIS 1ST 20SQCM/<: CPT

## 2019-07-02 PROCEDURE — 99024 POSTOP FOLLOW-UP VISIT: CPT

## 2019-07-02 NOTE — PHYSICAL EXAM
[1+] : left 1+ [Ankle Swelling (On Exam)] : present [Ankle Swelling On The Left] : of the left ankle [Ankle Swelling On The Right] : mild [Varicose Veins Of Lower Extremities] : bilaterally [] : bilaterally [4 x 4] : 4 x 4  [de-identified] : all epicritic sensations grossly intact left lower extremity [de-identified] : no clinical signs of cellulitis [FreeTextEntry1] : dorsal lateral ankle [FreeTextEntry2] : 2.6 cm [FreeTextEntry3] : .7 cm [FreeTextEntry4] : 3 mm [FreeTextEntry5] : curette / tweezers/ scissor  [de-identified] : santyl  [de-identified] : 3/1/

## 2019-07-02 NOTE — ASSESSMENT
[FreeTextEntry1] : --spent 30 min in f/u consultation with patient\par --reviewed prior history in full detail\par --[facility]\par --rx santyl to apply daily--continue\par --patient to f/u two week.\par --aseptic debridemont today with #15 blade of all fibrotic tissue to level of sub q with  santyl and dsd applied left ankle. Will inquire with insurance company regarding approval for application of [address]

## 2019-07-02 NOTE — HISTORY OF PRESENT ILLNESS
[de-identified] : Left ankle [de-identified] : Physical exam reveals a healthy-looking patient in no apparent distress the patient appeared to be fully alert oriented and in no significant complaints seems to be pleased with the surgical outcome and lesion of the left ankle demonstrate some swelling surrounding the ankle surgical wound healed completely no redness no discharge. At this stage patient was recommended to continue to be followed by the Wound Care service to continue to be seen by infectious disease the possibility of other future recurrence of infection could not be ruled out [de-identified] : Patient was seen today in followup state post infected septic left ankle joint post-to surgical procedures patient on IV antibiotic being followed by the microbial just infectious disease and also by the wound care service. At this stage patient seems to be stable having no significant complaints her surgical wound closed there is no discharge

## 2019-07-02 NOTE — REASON FOR VISIT
[Follow-Up: _____] : a [unfilled] follow-up visit [FreeTextEntry1] : Patient presents for second opinion of a left ankle ulcer sent by surgeon due to chronic nature and delayed healing, patient has been using hydrogen peroxide. Patient states she had abcess in ankle due to trauma and had it drained and is on IVabx and going to see ID doctor in two days to possibly d/c.  Patient relates she is going to continue with IV abx for next two weeks as per ID

## 2019-07-09 ENCOUNTER — APPOINTMENT (OUTPATIENT)
Dept: ORTHOPEDIC SURGERY | Facility: CLINIC | Age: 54
End: 2019-07-09

## 2019-07-11 ENCOUNTER — APPOINTMENT (OUTPATIENT)
Dept: INFECTIOUS DISEASE | Facility: CLINIC | Age: 54
End: 2019-07-11
Payer: COMMERCIAL

## 2019-07-11 VITALS
SYSTOLIC BLOOD PRESSURE: 118 MMHG | TEMPERATURE: 97.2 F | BODY MASS INDEX: 24.66 KG/M2 | WEIGHT: 148 LBS | HEIGHT: 65 IN | HEART RATE: 117 BPM | OXYGEN SATURATION: 96 % | DIASTOLIC BLOOD PRESSURE: 72 MMHG

## 2019-07-11 DIAGNOSIS — M00.9 PYOGENIC ARTHRITIS, UNSPECIFIED: ICD-10-CM

## 2019-07-11 DIAGNOSIS — L97.322 NON-PRESSURE CHRONIC ULCER OF LEFT ANKLE WITH FAT LAYER EXPOSED: ICD-10-CM

## 2019-07-11 DIAGNOSIS — A49.01 METHICILLIN SUSCEPTIBLE STAPHYLOCOCCUS AUREUS INFECTION, UNSPECIFIED SITE: ICD-10-CM

## 2019-07-11 PROCEDURE — 99214 OFFICE O/P EST MOD 30 MIN: CPT

## 2019-07-16 ENCOUNTER — APPOINTMENT (OUTPATIENT)
Dept: WOUND CARE | Facility: CLINIC | Age: 54
End: 2019-07-16

## 2019-08-20 ENCOUNTER — APPOINTMENT (OUTPATIENT)
Dept: ORTHOPEDIC SURGERY | Facility: CLINIC | Age: 54
End: 2019-08-20

## 2019-08-20 ENCOUNTER — INPATIENT (INPATIENT)
Facility: HOSPITAL | Age: 54
LOS: 22 days | Discharge: HOME | End: 2019-09-12
Attending: PSYCHIATRY & NEUROLOGY | Admitting: PSYCHIATRY & NEUROLOGY
Payer: COMMERCIAL

## 2019-08-20 VITALS — TEMPERATURE: 99 F | WEIGHT: 154.98 LBS | RESPIRATION RATE: 16 BRPM | HEIGHT: 64 IN

## 2019-08-20 DIAGNOSIS — F31.9 BIPOLAR DISORDER, UNSPECIFIED: ICD-10-CM

## 2019-08-20 DIAGNOSIS — Z90.89 ACQUIRED ABSENCE OF OTHER ORGANS: Chronic | ICD-10-CM

## 2019-08-20 DIAGNOSIS — Z98.890 OTHER SPECIFIED POSTPROCEDURAL STATES: Chronic | ICD-10-CM

## 2019-08-20 DIAGNOSIS — Z90.49 ACQUIRED ABSENCE OF OTHER SPECIFIED PARTS OF DIGESTIVE TRACT: Chronic | ICD-10-CM

## 2019-08-20 RX ORDER — DIPHENHYDRAMINE HCL 50 MG
50 CAPSULE ORAL EVERY 6 HOURS
Refills: 0 | Status: DISCONTINUED | OUTPATIENT
Start: 2019-08-20 | End: 2019-09-12

## 2019-08-20 RX ORDER — CLONAZEPAM 1 MG
1 TABLET ORAL AT BEDTIME
Refills: 0 | Status: DISCONTINUED | OUTPATIENT
Start: 2019-08-20 | End: 2019-08-21

## 2019-08-20 RX ORDER — QUETIAPINE FUMARATE 200 MG/1
75 TABLET, FILM COATED ORAL AT BEDTIME
Refills: 0 | Status: DISCONTINUED | OUTPATIENT
Start: 2019-08-20 | End: 2019-08-21

## 2019-08-20 RX ORDER — HALOPERIDOL DECANOATE 100 MG/ML
5 INJECTION INTRAMUSCULAR EVERY 6 HOURS
Refills: 0 | Status: DISCONTINUED | OUTPATIENT
Start: 2019-08-20 | End: 2019-09-12

## 2019-08-20 RX ORDER — NITROFURANTOIN MACROCRYSTAL 50 MG
100 CAPSULE ORAL
Refills: 0 | Status: COMPLETED | OUTPATIENT
Start: 2019-08-20 | End: 2019-08-25

## 2019-08-20 RX ORDER — CIPROFLOXACIN LACTATE 400MG/40ML
500 VIAL (ML) INTRAVENOUS EVERY 12 HOURS
Refills: 0 | Status: DISCONTINUED | OUTPATIENT
Start: 2019-08-20 | End: 2019-08-20

## 2019-08-20 RX ADMIN — Medication 1 MILLIGRAM(S): at 21:09

## 2019-08-20 RX ADMIN — QUETIAPINE FUMARATE 75 MILLIGRAM(S): 200 TABLET, FILM COATED ORAL at 21:09

## 2019-08-20 NOTE — CHART NOTE - NSCHARTNOTEFT_GEN_A_CORE
Screening Medical Evaluation  Patient Admitted from: [facility]    [facility] admitting diagnosis: Bipolar affective disorder     PAST MEDICAL & SURGICAL HISTORY:  Acquired hypothyroidism: &quot;from radiation&quot;, pt. 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, on drops to prevenyt glaucoma, the pressure is not high&quot;  Depression  History of seizure: x1-2007  H/O lymph node biopsy: &quot;mediastinal&quot;-2005  History of appendectomy: as a child  History of tonsillectomy: as a child        Allergies    penicillin (Rash)    Intolerances        Social History:     FAMILY HISTORY:  No pertinent family history in first degree relatives      MEDICATIONS  (STANDING):  clonazePAM  Tablet 1 milliGRAM(s) Oral at bedtime  nitrofurantoin monohydrate/macrocrystals (MACROBID) 100 milliGRAM(s) Oral two times a day with meals  QUEtiapine 75 milliGRAM(s) Oral at bedtime    MEDICATIONS  (PRN):  diphenhydrAMINE 50 milliGRAM(s) Oral every 6 hours PRN eps prophylaxis  diphenhydrAMINE   Injectable 50 milliGRAM(s) IntraMuscular every 6 hours PRN eps prophylaxis  haloperidol     Tablet 5 milliGRAM(s) Oral every 6 hours PRN agitation  haloperidol    Injectable 5 milliGRAM(s) IntraMuscular every 6 hours PRN severe agitation  LORazepam     Tablet 2 milliGRAM(s) Oral every 6 hours PRN anxiety/agitation  LORazepam   Injectable 2 milliGRAM(s) IntraMuscular every 6 hours PRN severe agitation      Vital Signs Last 24 Hrs  T(C): 37 (20 Aug 2019 17:26), Max: 37 (20 Aug 2019 17:26)  T(F): 98.6 (20 Aug 2019 17:26), Max: 98.6 (20 Aug 2019 17:26)  HR: 108(20 Aug 2019 19:21) (18 - 18)  BP: 139/75  BP(mean): --  RR: 16 (20 Aug 2019 17:26) (16 - 16)  SpO2: --  CAPILLARY BLOOD GLUCOSE            PHYSICAL EXAM:  GENERAL: NAD, well-developed  HEAD:  Atraumatic, Normocephalic  EYES: EOMI, PERRLA, conjunctiva and sclera clear  NECK: Supple, No JVD  CHEST/LUNG: Clear to auscultation bilaterally; No wheeze  HEART: Regular rate and rhythm; No murmurs, rubs, or gallops  ABDOMEN: Soft, Nontender, Nondistended; Bowel sounds present  EXTREMITIES:  2+ Peripheral Pulses, No clubbing, cyanosis, or edema  PSYCH: AAOx3  NEUROLOGY: non-focal  SKIN: No rashes or lesions    LABS:                    RADIOLOGY & ADDITIONAL TESTS:    Assessment and Plan:  54 year old female presenting today from [facility] to [facility] with admitting diagnosis of Bipolar affective disorder with PMh of hodgkins Lymphoma in 2005 and stroke as per pt. Pt has hx of 3 SA via OD requiring intubuation and one SA cut of antecubital fossa requring suture. Denies any fever, chills, headache, chest pain, SOB, abdominal pain, N/V/D/C. Phsycial exam showed cut to big toe with hx of staph infx, podiatry consult placed. Follow up with labs in AM.  1) Cuts to Foot: Podiatry consult palced.  2) Bipolar affective disorder: Follow care plan as per primary team.   3) UTI: Continue Macrobid `00mg BID x 5days.

## 2019-08-21 LAB
CHOLEST SERPL-MCNC: 186 MG/DL — SIGNIFICANT CHANGE UP (ref 120–199)
HBA1C BLD-MCNC: 5.5 % — SIGNIFICANT CHANGE UP (ref 4–5.6)
HDLC SERPL-MCNC: 69 MG/DL — HIGH (ref 45–65)
LIPID PNL WITH DIRECT LDL SERPL: 98 MG/DL — SIGNIFICANT CHANGE UP
T3 SERPL-MCNC: 152 NG/DL — SIGNIFICANT CHANGE UP (ref 80–200)
T4 AB SER-ACNC: 7.66 UG/DL — SIGNIFICANT CHANGE UP (ref 5.1–13)
TRIGL SERPL-MCNC: 183 MG/DL — HIGH (ref 10–149)
TSH SERPL-MCNC: 8.83 UIU/ML — HIGH (ref 0.27–4.2)

## 2019-08-21 PROCEDURE — 99223 1ST HOSP IP/OBS HIGH 75: CPT

## 2019-08-21 PROCEDURE — 99232 SBSQ HOSP IP/OBS MODERATE 35: CPT

## 2019-08-21 RX ORDER — RISPERIDONE 4 MG/1
1 TABLET ORAL AT BEDTIME
Refills: 0 | Status: DISCONTINUED | OUTPATIENT
Start: 2019-08-21 | End: 2019-08-22

## 2019-08-21 RX ORDER — CLONAZEPAM 1 MG
1 TABLET ORAL
Refills: 0 | Status: DISCONTINUED | OUTPATIENT
Start: 2019-08-21 | End: 2019-08-26

## 2019-08-21 RX ORDER — LACTOBACILLUS ACIDOPHILUS 100MM CELL
1 CAPSULE ORAL DAILY
Refills: 0 | Status: DISCONTINUED | OUTPATIENT
Start: 2019-08-21 | End: 2019-09-12

## 2019-08-21 RX ADMIN — Medication 2 MILLIGRAM(S): at 21:14

## 2019-08-21 RX ADMIN — Medication 1 TABLET(S): at 11:09

## 2019-08-21 RX ADMIN — Medication 1 MILLIGRAM(S): at 20:18

## 2019-08-21 RX ADMIN — Medication 100 MILLIGRAM(S): at 11:09

## 2019-08-21 RX ADMIN — HALOPERIDOL DECANOATE 5 MILLIGRAM(S): 100 INJECTION INTRAMUSCULAR at 21:14

## 2019-08-21 RX ADMIN — Medication 100 MILLIGRAM(S): at 17:55

## 2019-08-21 NOTE — CONSULT NOTE ADULT - ASSESSMENT
54F Hx lymphoma (treated), Hx septic arthritis left ankle, subclinical hypothyroidism, UTI, elevated BP    Plan:  Left leg wound: Does not appear infected at this time.  F/u with podiatry for wound care. for now, dry sterile gauze dressing daily.      Elevated BP: Chart review indicates that patient has not required treatment for hypertension in the past.  May be physiologic. Continue to monitor.      Subclinical hypothyroidism: TSH elevation with normal thyroid hormone levels.  Most endocrinologists do not treat this condition until TSH>10.  Recheck TFTs in 2-3 months.    UTI: Unable to f/u UCx results from [facility]; continue macrobid and repeat Ucx if sx persist after abx completed.    Camille: Management per primary team.

## 2019-08-21 NOTE — CONSULT NOTE ADULT - SUBJECTIVE AND OBJECTIVE BOX
HPI: Pt is 54F admitted to [facility] 8/20/19 from [facility] ED for kena.  Her BPs have been elevated, but patient says this is due to stress, she does not take BP meds and her BP is usually low.  Outpatient records reviewed, normal BPs noted. She was treated for septic arthritis of the left ankle which began in March 2019 as a complication of a prior stem cell injection.  She was treated witha course of doxycycline but septic synovitis was diagnosed in May 2019 and the joint was surgically debrided at [facility] 5/21/19.  She subsequently was treated with a 6 weeks of ancef via PICC line and then another two weeks keflex.  She followed up with ID Dr Elif Herman 7/11/19 who said no further antibiotics were needed.  She was also follwoing with Dr Babak Byrd for treatment for treatment of the left ankle wound.  He last saw her 7/16/19 and planned to see her again but she has not yet seen him.  She had a follow up with orthopedic surgeon Dr Cain 8/20/19 but missed the appt because of this admission.  She reports that her ankle is no longer infected and is healing well.  She also reported urinary frequency and was diagnosed with a UTI at [facility] ED and started on macrobid.  Her right eye was crusted shut this morning, denies pain or itchiness.    PAST MEDICAL & SURGICAL HISTORY:  Acquired hypothyroidism, no longer on synthroid  History of palpitations  Gastritis  Arthritis  Cholelithiases  Fatty liver  Hodgkins lymphoma: 2005  Cerebrovascular accident (CVA): 2005  H/O eye disorder: takes alphagn to prevent glaucoma  Depression  History of seizure: x1-2007  H/O lymph node biopsy: 2005  History of appendectomy: as a child  History of tonsillectomy: as a child      Review of Systems:   CONSTITUTIONAL: No fever, weight loss, or fatigue  EYES: see HPI  ENMT:  No difficulty hearing, tinnitus, vertigo; No sinus or throat pain  NECK: No pain or stiffness  RESPIRATORY: No cough, wheezing, chills or hemoptysis; No shortness of breath  CARDIOVASCULAR: No chest pain, palpitations, dizziness, or leg swelling  GASTROINTESTINAL: No abdominal or epigastric pain. No nausea, vomiting, or hematemesis; No diarrhea or constipation. No melena or hematochezia.  GENITOURINARY: see HPI  NEUROLOGICAL: No headaches, memory loss, loss of strength, + reports residual left sided numbness from CVA  SKIN: No itching, burning, rashes, or lesions (other than left ankle wound)  LYMPH NODES: No enlarged glands  ENDOCRINE: No heat or cold intolerance; No hair loss  MUSCULOSKELETAL: No joint pain or swelling; No muscle, back, or extremity pain  HEME/LYMPH: No easy bruising, or bleeding gums  ALLERY AND IMMUNOLOGIC: No hives or eczema    Allergies    penicillin (Rash)    Intolerances        Social History: denies etoh tob idu    FAMILY HISTORY:  No pertinent family history in first degree relatives      MEDICATIONS  (STANDING):  clonazePAM  Tablet 1 milliGRAM(s) Oral at bedtime  lactobacillus acidophilus 1 Tablet(s) Oral daily  nitrofurantoin monohydrate/macrocrystals (MACROBID) 100 milliGRAM(s) Oral two times a day with meals  QUEtiapine 75 milliGRAM(s) Oral at bedtime    MEDICATIONS  (PRN):  diphenhydrAMINE 50 milliGRAM(s) Oral every 6 hours PRN eps prophylaxis  diphenhydrAMINE   Injectable 50 milliGRAM(s) IntraMuscular every 6 hours PRN eps prophylaxis  haloperidol     Tablet 5 milliGRAM(s) Oral every 6 hours PRN agitation  haloperidol    Injectable 5 milliGRAM(s) IntraMuscular every 6 hours PRN severe agitation  LORazepam     Tablet 2 milliGRAM(s) Oral every 6 hours PRN anxiety/agitation  LORazepam   Injectable 2 milliGRAM(s) IntraMuscular every 6 hours PRN severe agitation      Vital Signs Last 24 Hrs  T(C): 37.2 (21 Aug 2019 08:04), Max: 37.2 (21 Aug 2019 08:04)  T(F): 98.9 (21 Aug 2019 08:04), Max: 98.9 (21 Aug 2019 08:04)  HR: 118  BP: 150/98  BP(mean): --  RR: 16 (20 Aug 2019 17:26) (16 - 16)              PHYSICAL EXAM:  GENERAL: NAD, well-developed  HEAD:  Atraumatic, Normocephalic  EYES: EOMI, conjunctiva and sclera clear  NECK: Supple, No JVD  CHEST/LUNG: Clear to auscultation bilaterally; No wheeze  HEART: Regular rate and rhythm; No murmurs, rubs, or gallops  ABDOMEN: Soft, Nontender, Nondistended; Bowel sounds present  EXTREMITIES:  2+ Peripheral Pulses, No clubbing, cyanosis, or edema Left ankle mild erythema no warmth circumferential puffy swelling without fluctuance, very small wound with fibrin tissue   NEUROLOGY: decreased sensation LLE      LABS:    Thyroid Stimulating Hormone, Serum (08.21.19 @ 08:12)    Thyroid Stimulating Hormone, Serum: 8.83 uIU/mL    T4, Serum (08.21.19 @ 08:12)    T4, Serum: 7.66 ug/dL    Triiodothyronine, Total (T3 Total) (08.21.19 @ 08:12)    Triiodothyronine, Total (T3 Total): 152.0 ng/dL    Hemoglobin A1C, Whole Blood (08.21.19 @ 08:12)    Hemoglobin A1C, Whole Blood: 5.5 %          Consultant(s) Notes Reviewed:  [employer]scripts ID, wound care, hospital chart    Care Discussed with Consultants/Other Providers: Dr Regalado

## 2019-08-22 PROCEDURE — 99232 SBSQ HOSP IP/OBS MODERATE 35: CPT

## 2019-08-22 RX ORDER — PALIPERIDONE 1.5 MG/1
6 TABLET, EXTENDED RELEASE ORAL ONCE
Refills: 0 | Status: COMPLETED | OUTPATIENT
Start: 2019-08-22 | End: 2019-08-22

## 2019-08-22 RX ORDER — BRIMONIDINE TARTRATE 2 MG/MG
1 SOLUTION/ DROPS OPHTHALMIC
Refills: 0 | Status: DISCONTINUED | OUTPATIENT
Start: 2019-08-22 | End: 2019-09-12

## 2019-08-22 RX ORDER — PALIPERIDONE 1.5 MG/1
6 TABLET, EXTENDED RELEASE ORAL DAILY
Refills: 0 | Status: DISCONTINUED | OUTPATIENT
Start: 2019-08-23 | End: 2019-08-23

## 2019-08-22 RX ADMIN — Medication 1 MILLIGRAM(S): at 20:10

## 2019-08-22 RX ADMIN — Medication 1 TABLET(S): at 08:28

## 2019-08-22 RX ADMIN — BRIMONIDINE TARTRATE 1 DROP(S): 2 SOLUTION/ DROPS OPHTHALMIC at 20:50

## 2019-08-22 RX ADMIN — Medication 100 MILLIGRAM(S): at 18:25

## 2019-08-22 RX ADMIN — Medication 100 MILLIGRAM(S): at 08:28

## 2019-08-22 RX ADMIN — PALIPERIDONE 6 MILLIGRAM(S): 1.5 TABLET, EXTENDED RELEASE ORAL at 18:25

## 2019-08-23 LAB
BACTERIA UR CULT: SIGNIFICANT CHANGE UP
SPECIMEN SOURCE: SIGNIFICANT CHANGE UP

## 2019-08-23 PROCEDURE — 99232 SBSQ HOSP IP/OBS MODERATE 35: CPT

## 2019-08-23 RX ORDER — PALIPERIDONE 1.5 MG/1
9 TABLET, EXTENDED RELEASE ORAL AT BEDTIME
Refills: 0 | Status: DISCONTINUED | OUTPATIENT
Start: 2019-08-23 | End: 2019-08-29

## 2019-08-23 RX ADMIN — BRIMONIDINE TARTRATE 1 DROP(S): 2 SOLUTION/ DROPS OPHTHALMIC at 20:23

## 2019-08-23 RX ADMIN — Medication 100 MILLIGRAM(S): at 08:55

## 2019-08-23 RX ADMIN — Medication 1 MILLIGRAM(S): at 20:23

## 2019-08-23 RX ADMIN — Medication 1 TABLET(S): at 08:55

## 2019-08-23 RX ADMIN — PALIPERIDONE 6 MILLIGRAM(S): 1.5 TABLET, EXTENDED RELEASE ORAL at 20:53

## 2019-08-23 RX ADMIN — Medication 100 MILLIGRAM(S): at 16:23

## 2019-08-23 RX ADMIN — BRIMONIDINE TARTRATE 1 DROP(S): 2 SOLUTION/ DROPS OPHTHALMIC at 08:54

## 2019-08-24 PROCEDURE — 99232 SBSQ HOSP IP/OBS MODERATE 35: CPT

## 2019-08-24 RX ADMIN — BRIMONIDINE TARTRATE 1 DROP(S): 2 SOLUTION/ DROPS OPHTHALMIC at 08:53

## 2019-08-24 RX ADMIN — Medication 1 TABLET(S): at 08:53

## 2019-08-24 RX ADMIN — Medication 100 MILLIGRAM(S): at 16:58

## 2019-08-24 RX ADMIN — Medication 100 MILLIGRAM(S): at 08:53

## 2019-08-24 RX ADMIN — Medication 1 MILLIGRAM(S): at 21:53

## 2019-08-24 RX ADMIN — BRIMONIDINE TARTRATE 1 DROP(S): 2 SOLUTION/ DROPS OPHTHALMIC at 21:53

## 2019-08-24 RX ADMIN — PALIPERIDONE 6 MILLIGRAM(S): 1.5 TABLET, EXTENDED RELEASE ORAL at 21:53

## 2019-08-24 RX ADMIN — Medication 1 MILLIGRAM(S): at 08:53

## 2019-08-25 PROCEDURE — 99232 SBSQ HOSP IP/OBS MODERATE 35: CPT

## 2019-08-25 RX ADMIN — BRIMONIDINE TARTRATE 1 DROP(S): 2 SOLUTION/ DROPS OPHTHALMIC at 08:24

## 2019-08-25 RX ADMIN — Medication 100 MILLIGRAM(S): at 15:48

## 2019-08-25 RX ADMIN — Medication 1 TABLET(S): at 08:24

## 2019-08-25 RX ADMIN — Medication 1 MILLIGRAM(S): at 20:51

## 2019-08-25 RX ADMIN — BRIMONIDINE TARTRATE 1 DROP(S): 2 SOLUTION/ DROPS OPHTHALMIC at 20:51

## 2019-08-25 RX ADMIN — PALIPERIDONE 6 MILLIGRAM(S): 1.5 TABLET, EXTENDED RELEASE ORAL at 20:51

## 2019-08-25 RX ADMIN — Medication 100 MILLIGRAM(S): at 08:24

## 2019-08-26 PROCEDURE — 99232 SBSQ HOSP IP/OBS MODERATE 35: CPT

## 2019-08-26 RX ORDER — CLONAZEPAM 1 MG
1 TABLET ORAL AT BEDTIME
Refills: 0 | Status: DISCONTINUED | OUTPATIENT
Start: 2019-08-26 | End: 2019-08-27

## 2019-08-26 RX ADMIN — Medication 1 MILLIGRAM(S): at 20:09

## 2019-08-26 RX ADMIN — PALIPERIDONE 6 MILLIGRAM(S): 1.5 TABLET, EXTENDED RELEASE ORAL at 20:09

## 2019-08-26 RX ADMIN — BRIMONIDINE TARTRATE 1 DROP(S): 2 SOLUTION/ DROPS OPHTHALMIC at 20:09

## 2019-08-26 RX ADMIN — BRIMONIDINE TARTRATE 1 DROP(S): 2 SOLUTION/ DROPS OPHTHALMIC at 08:52

## 2019-08-26 RX ADMIN — Medication 1 TABLET(S): at 08:53

## 2019-08-27 PROCEDURE — 99232 SBSQ HOSP IP/OBS MODERATE 35: CPT

## 2019-08-27 RX ORDER — CLONAZEPAM 1 MG
1 TABLET ORAL AT BEDTIME
Refills: 0 | Status: DISCONTINUED | OUTPATIENT
Start: 2019-08-27 | End: 2019-09-03

## 2019-08-27 RX ADMIN — Medication 1 MILLIGRAM(S): at 20:33

## 2019-08-27 RX ADMIN — BRIMONIDINE TARTRATE 1 DROP(S): 2 SOLUTION/ DROPS OPHTHALMIC at 08:02

## 2019-08-27 RX ADMIN — PALIPERIDONE 9 MILLIGRAM(S): 1.5 TABLET, EXTENDED RELEASE ORAL at 20:33

## 2019-08-27 RX ADMIN — BRIMONIDINE TARTRATE 1 DROP(S): 2 SOLUTION/ DROPS OPHTHALMIC at 20:34

## 2019-08-27 RX ADMIN — Medication 1 TABLET(S): at 08:02

## 2019-08-28 PROCEDURE — 99231 SBSQ HOSP IP/OBS SF/LOW 25: CPT

## 2019-08-28 RX ADMIN — Medication 1 MILLIGRAM(S): at 21:01

## 2019-08-28 RX ADMIN — BRIMONIDINE TARTRATE 1 DROP(S): 2 SOLUTION/ DROPS OPHTHALMIC at 21:04

## 2019-08-28 RX ADMIN — BRIMONIDINE TARTRATE 1 DROP(S): 2 SOLUTION/ DROPS OPHTHALMIC at 09:47

## 2019-08-28 RX ADMIN — Medication 1 TABLET(S): at 09:47

## 2019-08-28 RX ADMIN — PALIPERIDONE 9 MILLIGRAM(S): 1.5 TABLET, EXTENDED RELEASE ORAL at 21:01

## 2019-08-29 PROCEDURE — 99232 SBSQ HOSP IP/OBS MODERATE 35: CPT

## 2019-08-29 RX ORDER — PALIPERIDONE 1.5 MG/1
12 TABLET, EXTENDED RELEASE ORAL AT BEDTIME
Refills: 0 | Status: DISCONTINUED | OUTPATIENT
Start: 2019-08-29 | End: 2019-09-05

## 2019-08-29 RX ADMIN — PALIPERIDONE 12 MILLIGRAM(S): 1.5 TABLET, EXTENDED RELEASE ORAL at 21:07

## 2019-08-29 RX ADMIN — Medication 1 TABLET(S): at 09:07

## 2019-08-29 RX ADMIN — BRIMONIDINE TARTRATE 1 DROP(S): 2 SOLUTION/ DROPS OPHTHALMIC at 09:07

## 2019-08-29 RX ADMIN — Medication 1 MILLIGRAM(S): at 21:07

## 2019-08-29 RX ADMIN — BRIMONIDINE TARTRATE 1 DROP(S): 2 SOLUTION/ DROPS OPHTHALMIC at 21:07

## 2019-08-30 PROCEDURE — 99231 SBSQ HOSP IP/OBS SF/LOW 25: CPT

## 2019-08-30 RX ORDER — LANOLIN ALCOHOL/MO/W.PET/CERES
5 CREAM (GRAM) TOPICAL AT BEDTIME
Refills: 0 | Status: DISCONTINUED | OUTPATIENT
Start: 2019-08-30 | End: 2019-09-03

## 2019-08-30 RX ADMIN — Medication 1 MILLIGRAM(S): at 20:52

## 2019-08-30 RX ADMIN — BRIMONIDINE TARTRATE 1 DROP(S): 2 SOLUTION/ DROPS OPHTHALMIC at 20:52

## 2019-08-30 RX ADMIN — PALIPERIDONE 12 MILLIGRAM(S): 1.5 TABLET, EXTENDED RELEASE ORAL at 20:52

## 2019-08-30 RX ADMIN — Medication 1 TABLET(S): at 08:14

## 2019-08-30 RX ADMIN — BRIMONIDINE TARTRATE 1 DROP(S): 2 SOLUTION/ DROPS OPHTHALMIC at 08:14

## 2019-08-31 RX ADMIN — Medication 1 TABLET(S): at 08:45

## 2019-08-31 RX ADMIN — Medication 1 MILLIGRAM(S): at 20:52

## 2019-08-31 RX ADMIN — BRIMONIDINE TARTRATE 1 DROP(S): 2 SOLUTION/ DROPS OPHTHALMIC at 08:45

## 2019-08-31 RX ADMIN — PALIPERIDONE 12 MILLIGRAM(S): 1.5 TABLET, EXTENDED RELEASE ORAL at 20:52

## 2019-08-31 RX ADMIN — BRIMONIDINE TARTRATE 1 DROP(S): 2 SOLUTION/ DROPS OPHTHALMIC at 20:53

## 2019-09-01 RX ADMIN — Medication 1 MILLIGRAM(S): at 20:38

## 2019-09-01 RX ADMIN — PALIPERIDONE 12 MILLIGRAM(S): 1.5 TABLET, EXTENDED RELEASE ORAL at 20:38

## 2019-09-01 RX ADMIN — Medication 1 TABLET(S): at 09:16

## 2019-09-01 RX ADMIN — BRIMONIDINE TARTRATE 1 DROP(S): 2 SOLUTION/ DROPS OPHTHALMIC at 09:16

## 2019-09-01 RX ADMIN — BRIMONIDINE TARTRATE 1 DROP(S): 2 SOLUTION/ DROPS OPHTHALMIC at 20:37

## 2019-09-02 RX ADMIN — PALIPERIDONE 12 MILLIGRAM(S): 1.5 TABLET, EXTENDED RELEASE ORAL at 20:28

## 2019-09-02 RX ADMIN — Medication 1 TABLET(S): at 09:51

## 2019-09-02 RX ADMIN — Medication 1 MILLIGRAM(S): at 20:29

## 2019-09-02 RX ADMIN — BRIMONIDINE TARTRATE 1 DROP(S): 2 SOLUTION/ DROPS OPHTHALMIC at 09:51

## 2019-09-02 RX ADMIN — BRIMONIDINE TARTRATE 1 DROP(S): 2 SOLUTION/ DROPS OPHTHALMIC at 20:29

## 2019-09-03 PROCEDURE — 99231 SBSQ HOSP IP/OBS SF/LOW 25: CPT

## 2019-09-03 RX ORDER — CLONAZEPAM 1 MG
1 TABLET ORAL AT BEDTIME
Refills: 0 | Status: DISCONTINUED | OUTPATIENT
Start: 2019-09-03 | End: 2019-09-03

## 2019-09-03 RX ORDER — CLONAZEPAM 1 MG
0.5 TABLET ORAL AT BEDTIME
Refills: 0 | Status: DISCONTINUED | OUTPATIENT
Start: 2019-09-03 | End: 2019-09-10

## 2019-09-03 RX ORDER — LANOLIN ALCOHOL/MO/W.PET/CERES
5 CREAM (GRAM) TOPICAL AT BEDTIME
Refills: 0 | Status: DISCONTINUED | OUTPATIENT
Start: 2019-09-03 | End: 2019-09-12

## 2019-09-03 RX ADMIN — BRIMONIDINE TARTRATE 1 DROP(S): 2 SOLUTION/ DROPS OPHTHALMIC at 20:53

## 2019-09-03 RX ADMIN — Medication 0.5 MILLIGRAM(S): at 20:31

## 2019-09-03 RX ADMIN — BRIMONIDINE TARTRATE 1 DROP(S): 2 SOLUTION/ DROPS OPHTHALMIC at 08:59

## 2019-09-03 RX ADMIN — PALIPERIDONE 12 MILLIGRAM(S): 1.5 TABLET, EXTENDED RELEASE ORAL at 20:31

## 2019-09-03 RX ADMIN — Medication 1 TABLET(S): at 08:59

## 2019-09-04 PROCEDURE — 99231 SBSQ HOSP IP/OBS SF/LOW 25: CPT

## 2019-09-04 RX ADMIN — BRIMONIDINE TARTRATE 1 DROP(S): 2 SOLUTION/ DROPS OPHTHALMIC at 21:30

## 2019-09-04 RX ADMIN — Medication 0.5 MILLIGRAM(S): at 20:54

## 2019-09-04 RX ADMIN — Medication 1 TABLET(S): at 07:51

## 2019-09-04 RX ADMIN — BRIMONIDINE TARTRATE 1 DROP(S): 2 SOLUTION/ DROPS OPHTHALMIC at 07:51

## 2019-09-04 RX ADMIN — PALIPERIDONE 12 MILLIGRAM(S): 1.5 TABLET, EXTENDED RELEASE ORAL at 20:54

## 2019-09-05 PROCEDURE — 99232 SBSQ HOSP IP/OBS MODERATE 35: CPT

## 2019-09-05 RX ORDER — PALIPERIDONE 1.5 MG/1
9 TABLET, EXTENDED RELEASE ORAL AT BEDTIME
Refills: 0 | Status: DISCONTINUED | OUTPATIENT
Start: 2019-09-05 | End: 2019-09-12

## 2019-09-05 RX ORDER — PALIPERIDONE 1.5 MG/1
9 TABLET, EXTENDED RELEASE ORAL AT BEDTIME
Refills: 0 | Status: DISCONTINUED | OUTPATIENT
Start: 2019-09-05 | End: 2019-09-05

## 2019-09-05 RX ADMIN — Medication 0.5 MILLIGRAM(S): at 20:35

## 2019-09-05 RX ADMIN — Medication 1 TABLET(S): at 08:35

## 2019-09-05 RX ADMIN — BRIMONIDINE TARTRATE 1 DROP(S): 2 SOLUTION/ DROPS OPHTHALMIC at 20:51

## 2019-09-05 RX ADMIN — BRIMONIDINE TARTRATE 1 DROP(S): 2 SOLUTION/ DROPS OPHTHALMIC at 08:35

## 2019-09-05 RX ADMIN — PALIPERIDONE 9 MILLIGRAM(S): 1.5 TABLET, EXTENDED RELEASE ORAL at 20:35

## 2019-09-06 PROCEDURE — 99232 SBSQ HOSP IP/OBS MODERATE 35: CPT

## 2019-09-06 RX ORDER — BENZTROPINE MESYLATE 1 MG
0.5 TABLET ORAL AT BEDTIME
Refills: 0 | Status: DISCONTINUED | OUTPATIENT
Start: 2019-09-06 | End: 2019-09-09

## 2019-09-06 RX ADMIN — Medication 1 TABLET(S): at 09:36

## 2019-09-06 RX ADMIN — Medication 0.5 MILLIGRAM(S): at 20:23

## 2019-09-06 RX ADMIN — BRIMONIDINE TARTRATE 1 DROP(S): 2 SOLUTION/ DROPS OPHTHALMIC at 20:22

## 2019-09-06 RX ADMIN — PALIPERIDONE 9 MILLIGRAM(S): 1.5 TABLET, EXTENDED RELEASE ORAL at 20:22

## 2019-09-06 RX ADMIN — BRIMONIDINE TARTRATE 1 DROP(S): 2 SOLUTION/ DROPS OPHTHALMIC at 08:00

## 2019-09-06 RX ADMIN — Medication 0.5 MILLIGRAM(S): at 20:22

## 2019-09-07 RX ORDER — BENZTROPINE MESYLATE 1 MG
0.5 TABLET ORAL ONCE
Refills: 0 | Status: COMPLETED | OUTPATIENT
Start: 2019-09-07 | End: 2019-09-07

## 2019-09-07 RX ADMIN — PALIPERIDONE 9 MILLIGRAM(S): 1.5 TABLET, EXTENDED RELEASE ORAL at 21:23

## 2019-09-07 RX ADMIN — Medication 0.5 MILLIGRAM(S): at 09:29

## 2019-09-07 RX ADMIN — Medication 0.5 MILLIGRAM(S): at 21:23

## 2019-09-07 RX ADMIN — BRIMONIDINE TARTRATE 1 DROP(S): 2 SOLUTION/ DROPS OPHTHALMIC at 09:28

## 2019-09-07 RX ADMIN — BRIMONIDINE TARTRATE 1 DROP(S): 2 SOLUTION/ DROPS OPHTHALMIC at 21:23

## 2019-09-07 RX ADMIN — Medication 1 TABLET(S): at 09:28

## 2019-09-08 RX ADMIN — BRIMONIDINE TARTRATE 1 DROP(S): 2 SOLUTION/ DROPS OPHTHALMIC at 20:18

## 2019-09-08 RX ADMIN — Medication 0.5 MILLIGRAM(S): at 20:18

## 2019-09-08 RX ADMIN — Medication 1 TABLET(S): at 08:48

## 2019-09-08 RX ADMIN — PALIPERIDONE 9 MILLIGRAM(S): 1.5 TABLET, EXTENDED RELEASE ORAL at 20:18

## 2019-09-08 RX ADMIN — BRIMONIDINE TARTRATE 1 DROP(S): 2 SOLUTION/ DROPS OPHTHALMIC at 08:48

## 2019-09-09 PROCEDURE — 99231 SBSQ HOSP IP/OBS SF/LOW 25: CPT

## 2019-09-09 RX ORDER — BENZTROPINE MESYLATE 1 MG
0.5 TABLET ORAL
Refills: 0 | Status: DISCONTINUED | OUTPATIENT
Start: 2019-09-09 | End: 2019-09-10

## 2019-09-09 RX ADMIN — BRIMONIDINE TARTRATE 1 DROP(S): 2 SOLUTION/ DROPS OPHTHALMIC at 08:20

## 2019-09-09 RX ADMIN — Medication 1 TABLET(S): at 08:20

## 2019-09-09 RX ADMIN — BRIMONIDINE TARTRATE 1 DROP(S): 2 SOLUTION/ DROPS OPHTHALMIC at 20:23

## 2019-09-09 RX ADMIN — Medication 0.5 MILLIGRAM(S): at 20:23

## 2019-09-09 RX ADMIN — Medication 0.5 MILLIGRAM(S): at 11:50

## 2019-09-09 RX ADMIN — PALIPERIDONE 9 MILLIGRAM(S): 1.5 TABLET, EXTENDED RELEASE ORAL at 20:23

## 2019-09-10 PROCEDURE — 99231 SBSQ HOSP IP/OBS SF/LOW 25: CPT

## 2019-09-10 RX ORDER — BENZTROPINE MESYLATE 1 MG
1 TABLET ORAL AT BEDTIME
Refills: 0 | Status: DISCONTINUED | OUTPATIENT
Start: 2019-09-10 | End: 2019-09-12

## 2019-09-10 RX ORDER — CLONAZEPAM 1 MG
0.5 TABLET ORAL AT BEDTIME
Refills: 0 | Status: DISCONTINUED | OUTPATIENT
Start: 2019-09-10 | End: 2019-09-12

## 2019-09-10 RX ORDER — BENZTROPINE MESYLATE 1 MG
0.5 TABLET ORAL DAILY
Refills: 0 | Status: DISCONTINUED | OUTPATIENT
Start: 2019-09-10 | End: 2019-09-12

## 2019-09-10 RX ADMIN — BRIMONIDINE TARTRATE 1 DROP(S): 2 SOLUTION/ DROPS OPHTHALMIC at 20:54

## 2019-09-10 RX ADMIN — BRIMONIDINE TARTRATE 1 DROP(S): 2 SOLUTION/ DROPS OPHTHALMIC at 09:02

## 2019-09-10 RX ADMIN — Medication 1 MILLIGRAM(S): at 20:54

## 2019-09-10 RX ADMIN — Medication 1 TABLET(S): at 09:02

## 2019-09-10 RX ADMIN — Medication 0.5 MILLIGRAM(S): at 08:55

## 2019-09-10 RX ADMIN — Medication 0.5 MILLIGRAM(S): at 20:54

## 2019-09-10 RX ADMIN — PALIPERIDONE 9 MILLIGRAM(S): 1.5 TABLET, EXTENDED RELEASE ORAL at 20:54

## 2019-09-11 PROCEDURE — 99231 SBSQ HOSP IP/OBS SF/LOW 25: CPT

## 2019-09-11 RX ORDER — PALIPERIDONE 1.5 MG/1
1 TABLET, EXTENDED RELEASE ORAL
Qty: 14 | Refills: 0
Start: 2019-09-11 | End: 2019-09-24

## 2019-09-11 RX ORDER — BENZTROPINE MESYLATE 1 MG
1 TABLET ORAL
Qty: 14 | Refills: 0
Start: 2019-09-11 | End: 2019-09-24

## 2019-09-11 RX ORDER — BRIMONIDINE TARTRATE 2 MG/MG
1 SOLUTION/ DROPS OPHTHALMIC
Qty: 0 | Refills: 0 | DISCHARGE
Start: 2019-09-11

## 2019-09-11 RX ORDER — CLONAZEPAM 1 MG
1 TABLET ORAL
Qty: 14 | Refills: 0
Start: 2019-09-11 | End: 2019-09-24

## 2019-09-11 RX ADMIN — BRIMONIDINE TARTRATE 1 DROP(S): 2 SOLUTION/ DROPS OPHTHALMIC at 21:24

## 2019-09-11 RX ADMIN — BRIMONIDINE TARTRATE 1 DROP(S): 2 SOLUTION/ DROPS OPHTHALMIC at 09:50

## 2019-09-11 RX ADMIN — PALIPERIDONE 9 MILLIGRAM(S): 1.5 TABLET, EXTENDED RELEASE ORAL at 21:25

## 2019-09-11 RX ADMIN — Medication 0.5 MILLIGRAM(S): at 20:36

## 2019-09-11 RX ADMIN — Medication 0.5 MILLIGRAM(S): at 09:50

## 2019-09-11 RX ADMIN — Medication 1 TABLET(S): at 09:50

## 2019-09-11 RX ADMIN — Medication 1 MILLIGRAM(S): at 21:24

## 2019-09-12 VITALS — TEMPERATURE: 98 F

## 2019-09-12 PROCEDURE — 99231 SBSQ HOSP IP/OBS SF/LOW 25: CPT

## 2019-09-12 RX ADMIN — Medication 0.5 MILLIGRAM(S): at 07:40

## 2019-09-12 RX ADMIN — BRIMONIDINE TARTRATE 1 DROP(S): 2 SOLUTION/ DROPS OPHTHALMIC at 07:40

## 2019-09-12 RX ADMIN — Medication 1 TABLET(S): at 07:41

## 2019-09-13 PROBLEM — E03.9 HYPOTHYROIDISM, UNSPECIFIED: Chronic | Status: ACTIVE | Noted: 2019-03-21

## 2019-09-24 ENCOUNTER — APPOINTMENT (OUTPATIENT)
Dept: ORTHOPEDIC SURGERY | Facility: CLINIC | Age: 54
End: 2019-09-24
Payer: COMMERCIAL

## 2019-09-24 DIAGNOSIS — M86.9 OSTEOMYELITIS, UNSPECIFIED: ICD-10-CM

## 2019-09-24 PROCEDURE — 73600 X-RAY EXAM OF ANKLE: CPT | Mod: LT

## 2019-09-24 PROCEDURE — 99213 OFFICE O/P EST LOW 20 MIN: CPT

## 2019-09-24 NOTE — PHYSICAL EXAM
[FreeTextEntry1] : Physical exam reveals a healthy-looking patient in no apparent distress x-rays of the left ankle demonstrates a fully healed surgical scars no swelling no joint effusion no palpable masses no skin in routine no discharge at this stage patient was recommended to be followed conservatively\par X-ray of the left ankle show no any bony changes and patient will be followed and was seen again in 6 months for followup

## 2019-09-24 NOTE — HISTORY OF PRESENT ILLNESS
[FreeTextEntry1] : Patient was seen today in followup post a previous large abscess involving the left ankle post cervical decompression debridement irrigation and eventually surgical wound healed nice and gradually patient returned to a good level of activity and dysphagia having no significant complaints

## 2020-07-03 NOTE — ASU PATIENT PROFILE, ADULT - BRADEN SCORE (IF 18 OR LESS ACTIVATE SKIN INJURY RISK INCREASED GUIDELINE), MLM
[place] CARE COORDINATION  [facility]  July 3, 2020     Arlyn Stevens  [address] [facility] [address]      Dear Arlyn,    I am a clinic community health worker who works with Aiyana Ortiz MD, MD at the [facility]. I wanted to thank you for spending the time to talk with me.  Below is a description of clinic care coordination and how I can further assist you.      The clinic care coordination team is made up of a registered nurse,  and community health worker who understand the health care system. The goal of clinic care coordination is to help you manage your health and improve access to the health care system in the most efficient manner. The team can assist you in meeting your health care goals by providing education, coordinating services, strengthening the communication among your providers and supporting you with any resource needs.    Please feel free to contact me at [phone] with any questions or concerns. We are focused on providing you with the highest-quality healthcare experience possible and that all starts with you.     Sincerely,     YOLANDA Xiong   Clinic Care Coordination  [facility]:  [facility], [place], [place], and [place]  Phone: [phone]    
20

## 2020-09-29 NOTE — DISCHARGE NOTE NURSING/CASE MANAGEMENT/SOCIAL WORK - NSTRANSFEREYEGLASSESPAIRS_GEN_A_NUR
Cephalexin  F/U precautions and alarm signs discussed  
Smoker, chronic cough; wheezing on exam (denies SOB during visit)  Advair as rx'd  PCP F/U in 2-4 weeks and PRN  Alarm signs discussed  Consider PFTs in the future  
1 pair

## 2020-10-06 ENCOUNTER — APPOINTMENT (OUTPATIENT)
Dept: ORTHOPEDIC SURGERY | Facility: CLINIC | Age: 55
End: 2020-10-06
Payer: COMMERCIAL

## 2020-10-06 DIAGNOSIS — M79.89 OTHER SPECIFIED SOFT TISSUE DISORDERS: ICD-10-CM

## 2020-10-06 PROCEDURE — 73600 X-RAY EXAM OF ANKLE: CPT | Mod: LT

## 2020-10-06 PROCEDURE — 99213 OFFICE O/P EST LOW 20 MIN: CPT

## 2020-10-06 NOTE — PHYSICAL EXAM
[FreeTextEntry1] : Physical exam reveals a healthy looking patient in no apparent distress patient appears to be fully alert oriented examination of the left ankle demonstrate a fully healed surgical scar no swelling no palpable mass no skin erythema no redness and new plain x-ray of the left ankle AP lateral demonstrate a possible my note and arthritic changes.  At this point there is no any acute problem patient was recommended to be followed conservatively and to be seen as needed

## 2020-10-06 NOTE — HISTORY OF PRESENT ILLNESS
[FreeTextEntry1] : Patient was seen today in follow-up over 2 years ago patient presented with a large abscess involving the left ankle patient underwent to previous surgical procedures eventually we were able to overcome the infection and the wound and she had a good healing and recovery of the underlying process.  Recently patient having a localized tenderness over the left ankle

## 2021-08-12 NOTE — HISTORY OF PRESENT ILLNESS
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 Since then patient having a neurogenic pain syndrome of undetermined etiology. Although several years ago the patient had bone marrow injections to her left ankle knee and he has popped off undetermined at treatment was done in [place]. At this stage patient having a soft tissue fullness over the lateral aspect of the left ankle and producing localized tenderness PAST MEDICAL HISTORY:  HTN (hypertension)     Preeclampsia

## 2022-04-04 NOTE — ED PROVIDER NOTE - PHYSICAL EXAMINATION
ambulatory
LEFT ANKLE: clean incision site, well healed. edema, erythema, warmth overlying ankle join. pain with Active motion. NO pain with Passive motion.

## 2022-08-01 ENCOUNTER — APPOINTMENT (OUTPATIENT)
Dept: NEUROSURGERY | Facility: CLINIC | Age: 57
End: 2022-08-01

## 2022-08-15 NOTE — ED PROVIDER NOTE - MDM ORDERS SUBMITTED SELECTION
Discharge Instructions for  [facility] [address], [address] [facility]  Telephone: [phone]    [address] [place] [address] Information: Should you experience any significant changes in your wound(s) or have questions about your wound care, please contact the [facility] at [address] 8:00 am - 4:30. If you need help with your wound outside these hours and cannot wait until we are again available, contact your PCP or go to the hospital emergency room. NAME:  Gayla Georges  YOB: 1973  DATE:  8/15/2022    : Clemens Sandhoff     [x] Elevate leg(s) above the level of the heart when sitting. [x] Avoid prolonged standing in one place. The physician has recommended Dr. Moses Mendez at [facility]:  Phone Number: [phone]        Address[address] [address] [place], [place] [address]     [x] May Shower  [x] Do not need to cover area     Topical Treatments:  [x] Apply moisturizing lotion such as A&D ointment to healed skin daily      Edema Control: Every morning immediately when getting up should be applied to affected leg(s) from mid foot to knee making sure to cover the heel. Remove every night before going to bed if desired.     Apply: [x] Compression Stocking      [x]Right Leg           [x]Left Leg     Electronically signed on 8/15/2022 at 7:57 AM      Physician Signature:_______________________  Dr. Reno Schafer
Labs

## 2023-12-14 NOTE — ED ADULT NURSE NOTE - CAS EDP DISCH TYPE
Render Post-Care Instructions In Note?: yes Detail Level: Zone Consent: The patient's consent was obtained including but not limited to risks of crusting, scabbing, blistering, scarring, darker or lighter pigmentary change, recurrence, incomplete removal and infection. Render Note In Bullet Format When Appropriate: No Post-Care Instructions: I reviewed with the patient in detail post-care instructions. Patient is to wear sunprotection, and avoid picking at any of the treated lesions. Pt may apply Vaseline to crusted or scabbing areas. Duration Of Freeze Thaw-Cycle (Seconds): 3 Number Of Freeze-Thaw Cycles: 2 freeze-thaw cycles Home

## 2024-04-04 NOTE — H&P ADULT - PROBLEM/PLAN-8
Subjective:      Patient ID: Hoa Wilde is a 71 y.o. female.    Chief Complaint: Wound Care    Is Hoa is new to us here at [facility].  She has a history of left foot ulcerations and presents with 1 today.  She notes a new wound that presented about 2 weeks ago.  She denies any trauma to the area that she can recall.  She has another area of the bottom of her foot that is concerning for wound.  He denies any fever, nausea, vomiting or chills.  She admits that she has little feeling in her feet.    2/27/24: Ms. Wilde is doing okay. Has some L foot pain today. She last had x-rays showing erosions of the 5th met head and base of the proximal phalanx. She completed a 1 week course of augmentin that doesn't appear to have helped.     3/7/24:  F/u for L foot wound. She is doing much better than last week. She tolerated the antibiotics well. She has + chronic OM in the pathology and + bacteria in the cultures of the left 5th metatarsal taken last week.     3/21/24:   Ms. Camara returns for L foot wound. Doing well and has no complaints of pain.     Review of Systems   Skin:         Ulcer on left foot   Neurological:  Positive for numbness and paresthesias.   All other systems reviewed and are negative.          Objective:      Physical Exam  Cardiovascular:      Pulses:           Posterior tibial pulses are 1+ on the left side.   Feet:      Comments: Left foot:   Ulcer 1.  At the lateral aspect of the 5th MTPJ.  Measures 0.2 x 0.4 cm with 0.2 mm of depth.  Erythema and edema are resolved.  There is no fluctuance or crepitus or purulent drainage appreciated.  There is no probe to bone.    Ulcer 2 is fully epithelialized.               Assessment:       No diagnosis found.          Plan:       There are no diagnoses linked to this encounter.      I counseled the patient on her conditions, their implications and medical management.    Ms. Wilde was evaluated today with ID. Continue abx regiment.     F/u 2 weeks due to  "distance away.    [facility]   Please change dressing M and Th: -Cleanse wounds with sterile saline or wound cleanser. -Pat Dry. -Apply saline moistened rupali to wounds on the L foot. -Cover with robert foam, cast padding x 2 and secured with loose coban with no compression. Thank you.     Vamsi Aguilera DPM    Wound Debridement    Date/Time: 4/4/2024 8:40 AM    Performed by: Vamsi Aguilera DPM  Authorized by: Vamsi Aguilera DPM    Time out: Immediately prior to procedure a "time out" was called to verify the correct patient, procedure, equipment, support staff and site/side marked as required.    Consent Done?:  Yes (Verbal)    Wound Details:    Location:  Left foot    Location:  Left Midfoot    Type of Debridement:  Excisional       Length (cm):  0.2       Area (sq cm):  0.08       Width (cm):  0.4       Percent Debrided (%):  100       Depth (cm):  0.2       Total Area Debrided (sq cm):  0.08    Depth of debridement:  Subcutaneous tissue    Tissue debrided:  Subcutaneous    Devitalized tissue debrided:  Callus    Instruments:  Curette  Bleeding:  Minimal  Hemostasis Achieved: Yes  Method Used:  Pressure  Patient tolerance:  Patient tolerated the procedure well with no immediate complications  1st Wound Pain Assessment: 0                " DISPLAY PLAN FREE TEXT

## 2025-09-19 ENCOUNTER — APPOINTMENT (OUTPATIENT)
Dept: PSYCHIATRY | Facility: CLINIC | Age: 60
End: 2025-09-19

## 2025-09-26 PROBLEM — F51.05 INSOMNIA DUE TO MENTAL DISORDER: Status: ACTIVE | Noted: 2025-09-26
